# Patient Record
Sex: FEMALE | Race: WHITE | NOT HISPANIC OR LATINO | URBAN - METROPOLITAN AREA
[De-identification: names, ages, dates, MRNs, and addresses within clinical notes are randomized per-mention and may not be internally consistent; named-entity substitution may affect disease eponyms.]

---

## 2017-02-26 ENCOUNTER — EMERGENCY (EMERGENCY)
Facility: HOSPITAL | Age: 82
LOS: 1 days | Discharge: HOME | End: 2017-02-26
Attending: EMERGENCY MEDICINE | Admitting: EMERGENCY MEDICINE
Payer: COMMERCIAL

## 2017-02-26 VITALS
OXYGEN SATURATION: 97 % | HEART RATE: 61 BPM | DIASTOLIC BLOOD PRESSURE: 64 MMHG | SYSTOLIC BLOOD PRESSURE: 162 MMHG | RESPIRATION RATE: 16 BRPM

## 2017-02-26 VITALS
SYSTOLIC BLOOD PRESSURE: 164 MMHG | DIASTOLIC BLOOD PRESSURE: 61 MMHG | OXYGEN SATURATION: 97 % | TEMPERATURE: 97 F | RESPIRATION RATE: 18 BRPM | HEART RATE: 64 BPM

## 2017-02-26 DIAGNOSIS — Z96.659 PRESENCE OF UNSPECIFIED ARTIFICIAL KNEE JOINT: ICD-10-CM

## 2017-02-26 DIAGNOSIS — R42 DIZZINESS AND GIDDINESS: ICD-10-CM

## 2017-02-26 DIAGNOSIS — R26.2 DIFFICULTY IN WALKING, NOT ELSEWHERE CLASSIFIED: ICD-10-CM

## 2017-02-26 DIAGNOSIS — E11.9 TYPE 2 DIABETES MELLITUS WITHOUT COMPLICATIONS: ICD-10-CM

## 2017-02-26 DIAGNOSIS — Z96.659 PRESENCE OF UNSPECIFIED ARTIFICIAL KNEE JOINT: Chronic | ICD-10-CM

## 2017-02-26 PROBLEM — Z00.00 ENCOUNTER FOR PREVENTIVE HEALTH EXAMINATION: Status: ACTIVE | Noted: 2017-02-26

## 2017-02-26 LAB
ALBUMIN SERPL ELPH-MCNC: 4.1 G/DL — SIGNIFICANT CHANGE UP (ref 3.3–5)
ALP SERPL-CCNC: 77 U/L — SIGNIFICANT CHANGE UP (ref 40–120)
ALT FLD-CCNC: 38 U/L RC — SIGNIFICANT CHANGE UP (ref 10–45)
ANION GAP SERPL CALC-SCNC: 12 MMOL/L — SIGNIFICANT CHANGE UP (ref 5–17)
APPEARANCE UR: CLEAR — SIGNIFICANT CHANGE UP
AST SERPL-CCNC: 32 U/L — SIGNIFICANT CHANGE UP (ref 10–40)
BACTERIA # UR AUTO: ABNORMAL /HPF
BASOPHILS # BLD AUTO: 0.1 K/UL — SIGNIFICANT CHANGE UP (ref 0–0.2)
BASOPHILS NFR BLD AUTO: 1.1 % — SIGNIFICANT CHANGE UP (ref 0–2)
BILIRUB SERPL-MCNC: 0.4 MG/DL — SIGNIFICANT CHANGE UP (ref 0.2–1.2)
BILIRUB UR-MCNC: NEGATIVE — SIGNIFICANT CHANGE UP
BUN SERPL-MCNC: 17 MG/DL — SIGNIFICANT CHANGE UP (ref 7–23)
CALCIUM SERPL-MCNC: 9.5 MG/DL — SIGNIFICANT CHANGE UP (ref 8.4–10.5)
CHLORIDE SERPL-SCNC: 100 MMOL/L — SIGNIFICANT CHANGE UP (ref 96–108)
CO2 SERPL-SCNC: 29 MMOL/L — SIGNIFICANT CHANGE UP (ref 22–31)
COLOR SPEC: SIGNIFICANT CHANGE UP
CREAT SERPL-MCNC: 0.78 MG/DL — SIGNIFICANT CHANGE UP (ref 0.5–1.3)
DIFF PNL FLD: NEGATIVE — SIGNIFICANT CHANGE UP
EOSINOPHIL # BLD AUTO: 0.1 K/UL — SIGNIFICANT CHANGE UP (ref 0–0.5)
EOSINOPHIL NFR BLD AUTO: 2.4 % — SIGNIFICANT CHANGE UP (ref 0–6)
EPI CELLS # UR: SIGNIFICANT CHANGE UP /HPF
GLUCOSE SERPL-MCNC: 229 MG/DL — HIGH (ref 70–99)
GLUCOSE UR QL: NEGATIVE — SIGNIFICANT CHANGE UP
HCT VFR BLD CALC: 42.9 % — SIGNIFICANT CHANGE UP (ref 34.5–45)
HGB BLD-MCNC: 14.4 G/DL — SIGNIFICANT CHANGE UP (ref 11.5–15.5)
KETONES UR-MCNC: NEGATIVE — SIGNIFICANT CHANGE UP
LEUKOCYTE ESTERASE UR-ACNC: NEGATIVE — SIGNIFICANT CHANGE UP
LYMPHOCYTES # BLD AUTO: 1.6 K/UL — SIGNIFICANT CHANGE UP (ref 1–3.3)
LYMPHOCYTES # BLD AUTO: 26.9 % — SIGNIFICANT CHANGE UP (ref 13–44)
MCHC RBC-ENTMCNC: 32 PG — SIGNIFICANT CHANGE UP (ref 27–34)
MCHC RBC-ENTMCNC: 33.7 GM/DL — SIGNIFICANT CHANGE UP (ref 32–36)
MCV RBC AUTO: 95 FL — SIGNIFICANT CHANGE UP (ref 80–100)
MONOCYTES # BLD AUTO: 0.4 K/UL — SIGNIFICANT CHANGE UP (ref 0–0.9)
MONOCYTES NFR BLD AUTO: 6.3 % — SIGNIFICANT CHANGE UP (ref 2–14)
NEUTROPHILS # BLD AUTO: 3.7 K/UL — SIGNIFICANT CHANGE UP (ref 1.8–7.4)
NEUTROPHILS NFR BLD AUTO: 63.3 % — SIGNIFICANT CHANGE UP (ref 43–77)
NITRITE UR-MCNC: NEGATIVE — SIGNIFICANT CHANGE UP
PH UR: 6 — SIGNIFICANT CHANGE UP (ref 4.8–8)
PLATELET # BLD AUTO: 232 K/UL — SIGNIFICANT CHANGE UP (ref 150–400)
POTASSIUM SERPL-MCNC: 4.2 MMOL/L — SIGNIFICANT CHANGE UP (ref 3.5–5.3)
POTASSIUM SERPL-SCNC: 4.2 MMOL/L — SIGNIFICANT CHANGE UP (ref 3.5–5.3)
PROT SERPL-MCNC: 6.8 G/DL — SIGNIFICANT CHANGE UP (ref 6–8.3)
PROT UR-MCNC: NEGATIVE — SIGNIFICANT CHANGE UP
RBC # BLD: 4.52 M/UL — SIGNIFICANT CHANGE UP (ref 3.8–5.2)
RBC # FLD: 11.7 % — SIGNIFICANT CHANGE UP (ref 10.3–14.5)
RBC CASTS # UR COMP ASSIST: SIGNIFICANT CHANGE UP /HPF (ref 0–2)
SODIUM SERPL-SCNC: 141 MMOL/L — SIGNIFICANT CHANGE UP (ref 135–145)
SP GR SPEC: 1.01 — SIGNIFICANT CHANGE UP (ref 1.01–1.02)
UROBILINOGEN FLD QL: NEGATIVE — SIGNIFICANT CHANGE UP
WBC # BLD: 5.8 K/UL — SIGNIFICANT CHANGE UP (ref 3.8–10.5)
WBC # FLD AUTO: 5.8 K/UL — SIGNIFICANT CHANGE UP (ref 3.8–10.5)

## 2017-02-26 PROCEDURE — 70450 CT HEAD/BRAIN W/O DYE: CPT | Mod: 26

## 2017-02-26 PROCEDURE — 85027 COMPLETE CBC AUTOMATED: CPT

## 2017-02-26 PROCEDURE — 80053 COMPREHEN METABOLIC PANEL: CPT

## 2017-02-26 PROCEDURE — 81001 URINALYSIS AUTO W/SCOPE: CPT

## 2017-02-26 PROCEDURE — 82962 GLUCOSE BLOOD TEST: CPT

## 2017-02-26 PROCEDURE — 70450 CT HEAD/BRAIN W/O DYE: CPT

## 2017-02-26 PROCEDURE — 93010 ELECTROCARDIOGRAM REPORT: CPT

## 2017-02-26 PROCEDURE — 99284 EMERGENCY DEPT VISIT MOD MDM: CPT | Mod: 25

## 2017-02-26 PROCEDURE — 96374 THER/PROPH/DIAG INJ IV PUSH: CPT

## 2017-02-26 PROCEDURE — 99285 EMERGENCY DEPT VISIT HI MDM: CPT | Mod: 25

## 2017-02-26 PROCEDURE — 93005 ELECTROCARDIOGRAM TRACING: CPT

## 2017-02-26 RX ORDER — METOCLOPRAMIDE HCL 10 MG
10 TABLET ORAL ONCE
Qty: 0 | Refills: 0 | Status: COMPLETED | OUTPATIENT
Start: 2017-02-26 | End: 2017-02-26

## 2017-02-26 RX ORDER — SODIUM CHLORIDE 9 MG/ML
1000 INJECTION, SOLUTION INTRAVENOUS ONCE
Qty: 0 | Refills: 0 | Status: COMPLETED | OUTPATIENT
Start: 2017-02-26 | End: 2017-02-26

## 2017-02-26 RX ORDER — METOCLOPRAMIDE HCL 10 MG
1 TABLET ORAL
Qty: 15 | Refills: 0
Start: 2017-02-26 | End: 2017-03-03

## 2017-02-26 RX ADMIN — Medication 10 MILLIGRAM(S): at 12:35

## 2017-02-26 RX ADMIN — SODIUM CHLORIDE 333.33 MILLILITER(S): 9 INJECTION, SOLUTION INTRAVENOUS at 11:39

## 2017-02-26 NOTE — ED PROVIDER NOTE - OBJECTIVE STATEMENT
81F PMH T2DM p/w dizziness since 4pm yesterday.  Felt "hazy" and almost fell last night when going to the bathroom.  Describes symptoms as having "no balance", but has no room-spinning sensation.  Feels dizziness when lying down with eyes closed in addition.  Denies any other symptoms: no fever, chills, CP, SOB, URI/UTI symptoms, N/V/D, focal neurologic deficits, HA, vision changes.  No recent illnesses, sick contacts, recent travel.  Diabetes under questionable control, as FS in summer was in 400s.    PMD: Dr. Binta Santacruz

## 2017-02-26 NOTE — ED PROVIDER NOTE - PLAN OF CARE
Please follow up with your PMD in 24-48 hrs.  Use caution while ambulating.  Please take Reglan 10mg (1 tab) 3 times a day as needed.  If you experience worsening symptoms, please report to the ED ASAP.

## 2017-02-26 NOTE — ED ADULT NURSE NOTE - OBJECTIVE STATEMENT
Pt comes in form triage for dizziness that started yesterday.  Pt denies any weakness or numbness or tingling.  No blurred vision.  Pt denies chest pain  fever or any difficulty breathing.  IVL placed and fluids given as ordered.  ortho statics  done and pt asymptomatic when standing from sitting position.  even though bloods pressure lower Mpuleorn

## 2017-02-26 NOTE — ED ADULT NURSE NOTE - CHPI ED SYMPTOMS NEG
no nausea/no fever/no blurred vision/no loss of consciousness/no confusion/no change in level of consciousness/no numbness

## 2017-02-26 NOTE — ED PROVIDER NOTE - CARE PLAN
Principal Discharge DX:	Dizziness  Instructions for follow-up, activity and diet:	Please follow up with your PMD in 24-48 hrs.  Use caution while ambulating.  Please take Reglan 10mg (1 tab) 3 times a day as needed.  If you experience worsening symptoms, please report to the ED ASAP.

## 2017-02-26 NOTE — ED PROVIDER NOTE - MEDICAL DECISION MAKING DETAILS
Concerning for neurologic etiology.  F/U CT head, CBC, CMP, EKG, orthostatics.  FS: 220.  Will give 1L LR over 3 hrs.  Reglan 10mg IV x1.

## 2017-02-26 NOTE — ED PROVIDER NOTE - ATTENDING CONTRIBUTION TO CARE
I have seen and evaluated this patient with the resident.   I agree with the findings  unless other wise stated.  I have made appropriate changes in documentations where needed, After my face to face bedside evaluation, I am further  noting: Concerning for neurologic etiology.  F/U CT head, CBC, CMP, EKG, orthostatics.  FS: 220.  Will give 1L LR over 3 hrs.  Reglan 10mg IV x1. CT head neg for acute pathology improved treatment in ED ambulatory --Peña

## 2017-02-26 NOTE — ED PROVIDER NOTE - NEUROLOGICAL, MLM
Alert and oriented, no focal deficits, no motor or sensory deficits.  Heel to leg test normal.  UE coordination intact.

## 2017-02-26 NOTE — ED PROVIDER NOTE - PROGRESS NOTE DETAILS
CT head unremarkable.  Labs wnl.  Pt. able to ambulate.  Will D/C with 5 day rx for reglan and PMD follow up.

## 2017-02-26 NOTE — ED ADULT NURSE REASSESSMENT NOTE - NS ED NURSE REASSESS COMMENT FT1
1410 Pt d/c with elevated blood pressure as when she came in with MD aware Pt not on ay blood pressure medication Blancaorn

## 2017-11-28 ENCOUNTER — RESULT REVIEW (OUTPATIENT)
Age: 82
End: 2017-11-28

## 2019-12-18 ENCOUNTER — EMERGENCY (EMERGENCY)
Facility: HOSPITAL | Age: 84
LOS: 1 days | Discharge: HOME | End: 2019-12-18
Attending: EMERGENCY MEDICINE | Admitting: EMERGENCY MEDICINE
Payer: MEDICARE

## 2019-12-18 VITALS
OXYGEN SATURATION: 97 % | TEMPERATURE: 98 F | HEART RATE: 87 BPM | RESPIRATION RATE: 16 BRPM | SYSTOLIC BLOOD PRESSURE: 143 MMHG | DIASTOLIC BLOOD PRESSURE: 62 MMHG

## 2019-12-18 VITALS
OXYGEN SATURATION: 100 % | SYSTOLIC BLOOD PRESSURE: 134 MMHG | HEART RATE: 77 BPM | TEMPERATURE: 98 F | DIASTOLIC BLOOD PRESSURE: 63 MMHG | RESPIRATION RATE: 16 BRPM

## 2019-12-18 DIAGNOSIS — Z96.659 PRESENCE OF UNSPECIFIED ARTIFICIAL KNEE JOINT: Chronic | ICD-10-CM

## 2019-12-18 PROBLEM — E11.9 TYPE 2 DIABETES MELLITUS WITHOUT COMPLICATIONS: Chronic | Status: ACTIVE | Noted: 2017-02-26

## 2019-12-18 LAB
ALBUMIN SERPL ELPH-MCNC: 4.1 G/DL — SIGNIFICANT CHANGE UP (ref 3.3–5)
ALP SERPL-CCNC: 84 U/L — SIGNIFICANT CHANGE UP (ref 40–120)
ALT FLD-CCNC: 15 U/L — SIGNIFICANT CHANGE UP (ref 4–33)
ANION GAP SERPL CALC-SCNC: 18 MMO/L — HIGH (ref 7–14)
APPEARANCE UR: CLEAR — SIGNIFICANT CHANGE UP
AST SERPL-CCNC: 22 U/L — SIGNIFICANT CHANGE UP (ref 4–32)
BACTERIA # UR AUTO: SIGNIFICANT CHANGE UP
BASE EXCESS BLDV CALC-SCNC: 0.7 MMOL/L — SIGNIFICANT CHANGE UP
BASE EXCESS BLDV CALC-SCNC: 3.4 MMOL/L — SIGNIFICANT CHANGE UP
BASOPHILS # BLD AUTO: 0.04 K/UL — SIGNIFICANT CHANGE UP (ref 0–0.2)
BASOPHILS NFR BLD AUTO: 0.5 % — SIGNIFICANT CHANGE UP (ref 0–2)
BILIRUB SERPL-MCNC: 0.4 MG/DL — SIGNIFICANT CHANGE UP (ref 0.2–1.2)
BILIRUB UR-MCNC: NEGATIVE — SIGNIFICANT CHANGE UP
BLOOD GAS VENOUS - CREATININE: 0.77 MG/DL — SIGNIFICANT CHANGE UP (ref 0.5–1.3)
BLOOD GAS VENOUS - CREATININE: 0.8 MG/DL — SIGNIFICANT CHANGE UP (ref 0.5–1.3)
BLOOD GAS VENOUS - FIO2: 21 — SIGNIFICANT CHANGE UP
BLOOD GAS VENOUS - FIO2: 21 — SIGNIFICANT CHANGE UP
BLOOD UR QL VISUAL: NEGATIVE — SIGNIFICANT CHANGE UP
BUN SERPL-MCNC: 21 MG/DL — SIGNIFICANT CHANGE UP (ref 7–23)
CALCIUM SERPL-MCNC: 9.9 MG/DL — SIGNIFICANT CHANGE UP (ref 8.4–10.5)
CHLORIDE BLDV-SCNC: 102 MMOL/L — SIGNIFICANT CHANGE UP (ref 96–108)
CHLORIDE BLDV-SCNC: 99 MMOL/L — SIGNIFICANT CHANGE UP (ref 96–108)
CHLORIDE SERPL-SCNC: 96 MMOL/L — LOW (ref 98–107)
CO2 SERPL-SCNC: 23 MMOL/L — SIGNIFICANT CHANGE UP (ref 22–31)
COLOR SPEC: SIGNIFICANT CHANGE UP
CREAT SERPL-MCNC: 0.87 MG/DL — SIGNIFICANT CHANGE UP (ref 0.5–1.3)
EOSINOPHIL # BLD AUTO: 0.1 K/UL — SIGNIFICANT CHANGE UP (ref 0–0.5)
EOSINOPHIL NFR BLD AUTO: 1.3 % — SIGNIFICANT CHANGE UP (ref 0–6)
GAS PNL BLDV: 139 MMOL/L — SIGNIFICANT CHANGE UP (ref 136–146)
GAS PNL BLDV: 139 MMOL/L — SIGNIFICANT CHANGE UP (ref 136–146)
GLUCOSE BLDV-MCNC: 296 MG/DL — HIGH (ref 70–99)
GLUCOSE BLDV-MCNC: 358 MG/DL — HIGH (ref 70–99)
GLUCOSE SERPL-MCNC: 360 MG/DL — HIGH (ref 70–99)
GLUCOSE UR-MCNC: >1000 — HIGH
HCO3 BLDV-SCNC: 24 MMOL/L — SIGNIFICANT CHANGE UP (ref 20–27)
HCO3 BLDV-SCNC: 25 MMOL/L — SIGNIFICANT CHANGE UP (ref 20–27)
HCT VFR BLD CALC: 45.9 % — HIGH (ref 34.5–45)
HCT VFR BLDV CALC: 41.5 % — SIGNIFICANT CHANGE UP (ref 34.5–45)
HCT VFR BLDV CALC: 46.4 % — HIGH (ref 34.5–45)
HGB BLD-MCNC: 14.8 G/DL — SIGNIFICANT CHANGE UP (ref 11.5–15.5)
HGB BLDV-MCNC: 13.5 G/DL — SIGNIFICANT CHANGE UP (ref 11.5–15.5)
HGB BLDV-MCNC: 15.2 G/DL — SIGNIFICANT CHANGE UP (ref 11.5–15.5)
HYALINE CASTS # UR AUTO: SIGNIFICANT CHANGE UP
IMM GRANULOCYTES NFR BLD AUTO: 0.5 % — SIGNIFICANT CHANGE UP (ref 0–1.5)
KETONES UR-MCNC: NEGATIVE — SIGNIFICANT CHANGE UP
LACTATE BLDV-MCNC: 2.6 MMOL/L — HIGH (ref 0.5–2)
LACTATE BLDV-MCNC: 4.9 MMOL/L — CRITICAL HIGH (ref 0.5–2)
LEUKOCYTE ESTERASE UR-ACNC: NEGATIVE — SIGNIFICANT CHANGE UP
LYMPHOCYTES # BLD AUTO: 1.17 K/UL — SIGNIFICANT CHANGE UP (ref 1–3.3)
LYMPHOCYTES # BLD AUTO: 15 % — SIGNIFICANT CHANGE UP (ref 13–44)
MCHC RBC-ENTMCNC: 31.2 PG — SIGNIFICANT CHANGE UP (ref 27–34)
MCHC RBC-ENTMCNC: 32.2 % — SIGNIFICANT CHANGE UP (ref 32–36)
MCV RBC AUTO: 96.6 FL — SIGNIFICANT CHANGE UP (ref 80–100)
MONOCYTES # BLD AUTO: 0.5 K/UL — SIGNIFICANT CHANGE UP (ref 0–0.9)
MONOCYTES NFR BLD AUTO: 6.4 % — SIGNIFICANT CHANGE UP (ref 2–14)
NEUTROPHILS # BLD AUTO: 5.97 K/UL — SIGNIFICANT CHANGE UP (ref 1.8–7.4)
NEUTROPHILS NFR BLD AUTO: 76.3 % — SIGNIFICANT CHANGE UP (ref 43–77)
NITRITE UR-MCNC: NEGATIVE — SIGNIFICANT CHANGE UP
NRBC # FLD: 0 K/UL — SIGNIFICANT CHANGE UP (ref 0–0)
PCO2 BLDV: 51 MMHG — SIGNIFICANT CHANGE UP (ref 41–51)
PCO2 BLDV: 60 MMHG — HIGH (ref 41–51)
PH BLDV: 7.31 PH — LOW (ref 7.32–7.43)
PH BLDV: 7.33 PH — SIGNIFICANT CHANGE UP (ref 7.32–7.43)
PH UR: 5.5 — SIGNIFICANT CHANGE UP (ref 5–8)
PLATELET # BLD AUTO: 270 K/UL — SIGNIFICANT CHANGE UP (ref 150–400)
PMV BLD: 10.6 FL — SIGNIFICANT CHANGE UP (ref 7–13)
PO2 BLDV: 24 MMHG — LOW (ref 35–40)
PO2 BLDV: 40 MMHG — SIGNIFICANT CHANGE UP (ref 35–40)
POTASSIUM BLDV-SCNC: 4.2 MMOL/L — SIGNIFICANT CHANGE UP (ref 3.4–4.5)
POTASSIUM BLDV-SCNC: 4.6 MMOL/L — HIGH (ref 3.4–4.5)
POTASSIUM SERPL-MCNC: 4.6 MMOL/L — SIGNIFICANT CHANGE UP (ref 3.5–5.3)
POTASSIUM SERPL-SCNC: 4.6 MMOL/L — SIGNIFICANT CHANGE UP (ref 3.5–5.3)
PROT SERPL-MCNC: 7.3 G/DL — SIGNIFICANT CHANGE UP (ref 6–8.3)
PROT UR-MCNC: 20 — SIGNIFICANT CHANGE UP
RBC # BLD: 4.75 M/UL — SIGNIFICANT CHANGE UP (ref 3.8–5.2)
RBC # FLD: 12.4 % — SIGNIFICANT CHANGE UP (ref 10.3–14.5)
RBC CASTS # UR COMP ASSIST: HIGH (ref 0–?)
SAO2 % BLDV: 34.6 % — LOW (ref 60–85)
SAO2 % BLDV: 69.6 % — SIGNIFICANT CHANGE UP (ref 60–85)
SODIUM SERPL-SCNC: 137 MMOL/L — SIGNIFICANT CHANGE UP (ref 135–145)
SP GR SPEC: 1.03 — SIGNIFICANT CHANGE UP (ref 1–1.04)
SQUAMOUS # UR AUTO: SIGNIFICANT CHANGE UP
TROPONIN T, HIGH SENSITIVITY: 13 NG/L — SIGNIFICANT CHANGE UP (ref ?–14)
UROBILINOGEN FLD QL: NORMAL — SIGNIFICANT CHANGE UP
WBC # BLD: 7.82 K/UL — SIGNIFICANT CHANGE UP (ref 3.8–10.5)
WBC # FLD AUTO: 7.82 K/UL — SIGNIFICANT CHANGE UP (ref 3.8–10.5)
WBC UR QL: SIGNIFICANT CHANGE UP (ref 0–?)

## 2019-12-18 PROCEDURE — 71046 X-RAY EXAM CHEST 2 VIEWS: CPT | Mod: 26

## 2019-12-18 PROCEDURE — 74177 CT ABD & PELVIS W/CONTRAST: CPT | Mod: 26

## 2019-12-18 PROCEDURE — 99284 EMERGENCY DEPT VISIT MOD MDM: CPT

## 2019-12-18 RX ORDER — SODIUM CHLORIDE 9 MG/ML
1000 INJECTION INTRAMUSCULAR; INTRAVENOUS; SUBCUTANEOUS ONCE
Refills: 0 | Status: COMPLETED | OUTPATIENT
Start: 2019-12-18 | End: 2019-12-18

## 2019-12-18 RX ADMIN — SODIUM CHLORIDE 1000 MILLILITER(S): 9 INJECTION INTRAMUSCULAR; INTRAVENOUS; SUBCUTANEOUS at 10:50

## 2019-12-18 NOTE — ED PROVIDER NOTE - OBJECTIVE STATEMENT
83 y/o F PMH DM2 c/o acute on chronic diarrhea x 3-4 days. Pt reports diarrhea amount and frequency increased over the weekend. Pt baseline is loose stools 3-4x a day. Now BMs are watery. Called her PCP over the weekend who advised her she should go to the ER. Pt denies fever, abd pain, cp, sob. Denies having an colonoscopy during her reported chronic diarrhea issue over the past few years., recent antibx use, recent hospitalizations, recent travel.

## 2019-12-18 NOTE — ED PROVIDER NOTE - PROGRESS NOTE DETAILS
Pt feeling better, has no complaints. Spoke with pt's PMD, Dr. Del Cid, who states he can see her tomorrow.

## 2019-12-18 NOTE — ED PROVIDER NOTE - PATIENT PORTAL LINK FT
You can access the FollowMyHealth Patient Portal offered by [facility] by registering at the following website: [email or web address]. By joining [facility]’s FollowMyHealth portal, you will also be able to view your health information using other applications (apps) compatible with our system.

## 2019-12-18 NOTE — ED PROVIDER NOTE - ATTENDING CONTRIBUTION TO CARE
Dr. Farrell:  I performed a face to face bedside interview with patient regarding history of present illness, review of symptoms and past medical history. I completed an independent physical exam.  I have discussed patient's plan of care with PA.   I agree with note as stated above, having amended the EMR as needed to reflect my findings.   This includes HISTORY OF PRESENT ILLNESS, HIV, PAST MEDICAL/SURGICAL/FAMILY/SOCIAL HISTORY, ALLERGIES AND HOME MEDICATIONS, REVIEW OF SYSTEMS, PHYSICAL EXAM, and any PROGRESS NOTES during the time I functioned as the attending physician for this patient.    84F h/o NIDDM and chronic diarrhea sent in by PCP for reportedly worse diarrhea over past few days.  Patient with non-specific dizziness over past few days.  States she normally has 3 loose bowel movements a day but over past weekend has been having non-stop watery stool.      Exam:  - diaphoretic, no acute distress  - rrr  - ctab  - abd soft ntnd    A/P  - diarrhea, eval infection/abd pathology; dizziness possibly secondary to hypovolemia  - cbc, cmp, trop, vbg, CT abd/pelvis, cxr, ua, urine culture

## 2019-12-18 NOTE — ED ADULT NURSE NOTE - OBJECTIVE STATEMENT
Pt received in exam room 14. alert and oriented x3, ambulatory. Presents to ED co watery kristajannetteea x few days. States for 2 years she has had chronic diarrhea, but over last few days it has been watery and frequent. Denies abdominal pain, nausea, vomiting, fevers, chills, cp, sob. Labs sent. 20G IV placed to left AC. Pt received in exam room 14. alert and oriented x3, ambulatory. Past med history of diabetes. Presents to ED co watery diarrhea x "few days". States for 2 years she has had chronic diarrhea, but over last few days it has been watery and frequent. Denies ever seeing a GI. Denies abdominal pain, nausea, vomiting, fevers, chills, cp, sob. Co dizziness x 2 days. In exam room patient noted to be cool and clammy. No edema noted to extremities. . Labs sent. 20G IV placed to left AC. VS stable. Will continue to monitor.

## 2019-12-18 NOTE — ED PROVIDER NOTE - CLINICAL SUMMARY MEDICAL DECISION MAKING FREE TEXT BOX
83 y/o F PMH DM2 c/o acute on chronic diarrhea x 3-4 days. Pt reports diarrhea amount and frequency increased over the weekend.  abd soft/nt/nd, pt appears diaphoretic  dx: diarrhea, r/o GI pathology  plan: labs, troponin, ekg CTAP, 1L NS

## 2019-12-18 NOTE — ED PROVIDER NOTE - NSFOLLOWUPINSTRUCTIONS_ED_ALL_ED_FT
Advance activity as tolerated.  Continue all previously prescribed medications as directed unless otherwise instructed.  Follow up with your primary care physician and gastroenterology (referral list provided) in 48-72 hours- bring copies of your results.  Return to the ER for worsening or persistent symptoms, including but not limited to worsening/persistent diarrhea, lightheadedness, weakness, fevers, vomiting, and/or ANY NEW OR CONCERNING SYMPTOMS. If you have issues obtaining follow up, please call: [phone] to obtain a doctor or specialist who takes your insurance in your area.  You may call [phone] to make an appointment with the internal medicine clinic.

## 2019-12-18 NOTE — ED PROVIDER NOTE - CONSTITUTIONAL, MLM
normal... diaphoretic but Well appearing, awake, alert, oriented to person, place, time/situation and in no apparent distress.

## 2019-12-19 LAB
BACTERIA UR CULT: SIGNIFICANT CHANGE UP
SPECIMEN SOURCE: SIGNIFICANT CHANGE UP

## 2019-12-20 NOTE — ED POST DISCHARGE NOTE - RESULT SUMMARY
UCX: Culture grew 3 or more types of organisms which indicate collection contamination; consider recollection only if clinically indicated. No antibiotic noted.. Pt is elderly DM2 who c/o diarrhea and dizziness

## 2021-11-24 ENCOUNTER — APPOINTMENT (OUTPATIENT)
Dept: INTERNAL MEDICINE | Facility: CLINIC | Age: 86
End: 2021-11-24
Payer: MEDICARE

## 2021-11-24 ENCOUNTER — NON-APPOINTMENT (OUTPATIENT)
Age: 86
End: 2021-11-24

## 2021-11-24 VITALS
WEIGHT: 198 LBS | RESPIRATION RATE: 12 BRPM | SYSTOLIC BLOOD PRESSURE: 150 MMHG | TEMPERATURE: 96.3 F | BODY MASS INDEX: 36.44 KG/M2 | DIASTOLIC BLOOD PRESSURE: 72 MMHG | HEIGHT: 62 IN | OXYGEN SATURATION: 98 % | HEART RATE: 75 BPM

## 2021-11-24 DIAGNOSIS — Z00.00 ENCOUNTER FOR GENERAL ADULT MEDICAL EXAMINATION W/OUT ABNORMAL FINDINGS: ICD-10-CM

## 2021-11-24 DIAGNOSIS — Z23 ENCOUNTER FOR IMMUNIZATION: ICD-10-CM

## 2021-11-24 DIAGNOSIS — Z80.3 FAMILY HISTORY OF MALIGNANT NEOPLASM OF BREAST: ICD-10-CM

## 2021-11-24 PROCEDURE — 99387 INIT PM E/M NEW PAT 65+ YRS: CPT

## 2021-11-27 NOTE — PLAN
[FreeTextEntry1] : Physical\par \par 1. Diabetes\par Low carb, low sugar diet\par cont Amaryl 4 mg twice daily\par cont Prandin 1 mg TID \par \par Labs ordered pt to f/u for results

## 2021-11-27 NOTE — HISTORY OF PRESENT ILLNESS
[de-identified] : Padmini Lam 86 year old female with history of Diabetes x 10-15 years / Anemia  presents for annual  physical \par Her last doctor visit was  6 months ago . Not monitoring her glucose at home \par \par She reports about a month ago she fell trying to go to the bathroom at night. Denies hitting her head, or going to the ED at the time. She currently lives alone and independent\par She tries to eat healthy \par \par Denies CP, SOB, N/V or abdominal pain , denies headache \par last eye exam 1-2 years ago\par cardio exam never \par not compliant with  screenings

## 2021-11-27 NOTE — PHYSICAL EXAM
[No Acute Distress] : no acute distress [Well Nourished] : well nourished [Normal Sclera/Conjunctiva] : normal sclera/conjunctiva [EOMI] : extraocular movements intact [Normal Outer Ear/Nose] : the outer ears and nose were normal in appearance [Normal Oropharynx] : the oropharynx was normal [No JVD] : no jugular venous distention [No Lymphadenopathy] : no lymphadenopathy [Supple] : supple [Thyroid Normal, No Nodules] : the thyroid was normal and there were no nodules present [No Respiratory Distress] : no respiratory distress  [No Accessory Muscle Use] : no accessory muscle use [Clear to Auscultation] : lungs were clear to auscultation bilaterally [Normal Rate] : normal rate  [Regular Rhythm] : with a regular rhythm [Normal S1, S2] : normal S1 and S2 [No Murmur] : no murmur heard [Pedal Pulses Present] : the pedal pulses are present [No Edema] : there was no peripheral edema [Soft] : abdomen soft [Non Tender] : non-tender [Non-distended] : non-distended [Normal Bowel Sounds] : normal bowel sounds [Normal Posterior Cervical Nodes] : no posterior cervical lymphadenopathy [Normal Anterior Cervical Nodes] : no anterior cervical lymphadenopathy [No CVA Tenderness] : no CVA  tenderness [No Spinal Tenderness] : no spinal tenderness [No Joint Swelling] : no joint swelling [Grossly Normal Strength/Tone] : grossly normal strength/tone [No Rash] : no rash [No Focal Deficits] : no focal deficits [Normal Gait] : normal gait [Normal Affect] : the affect was normal [Normal Insight/Judgement] : insight and judgment were intact [Normal TMs] : both tympanic membranes were normal

## 2021-12-10 LAB
25(OH)D3 SERPL-MCNC: 45.3 NG/ML
ALBUMIN SERPL ELPH-MCNC: 4.2 G/DL
ALP BLD-CCNC: 96 U/L
ALT SERPL-CCNC: 22 U/L
ANION GAP SERPL CALC-SCNC: 21 MMOL/L
AST SERPL-CCNC: 30 U/L
BILIRUB SERPL-MCNC: 0.3 MG/DL
BUN SERPL-MCNC: 18 MG/DL
CALCIUM SERPL-MCNC: 10 MG/DL
CHLORIDE SERPL-SCNC: 97 MMOL/L
CHOLEST SERPL-MCNC: 224 MG/DL
CO2 SERPL-SCNC: 21 MMOL/L
COVID-19 NUCLEOCAPSID  GAM ANTIBODY INTERPRETATION: NEGATIVE
COVID-19 SPIKE DOMAIN ANTIBODY INTERPRETATION: POSITIVE
CREAT SERPL-MCNC: 0.88 MG/DL
FERRITIN SERPL-MCNC: 180 NG/ML
GLUCOSE SERPL-MCNC: 353 MG/DL
HDLC SERPL-MCNC: 64 MG/DL
IRON SATN MFR SERPL: 31 %
IRON SERPL-MCNC: 101 UG/DL
LDLC SERPL CALC-MCNC: 131 MG/DL
NONHDLC SERPL-MCNC: 160 MG/DL
POTASSIUM SERPL-SCNC: 5 MMOL/L
PROT SERPL-MCNC: 7.3 G/DL
SARS-COV-2 AB SERPL IA-ACNC: >250 U/ML
SARS-COV-2 AB SERPL QL IA: 0.08 INDEX
SODIUM SERPL-SCNC: 139 MMOL/L
TIBC SERPL-MCNC: 325 UG/DL
TRIGL SERPL-MCNC: 144 MG/DL
TSH SERPL-ACNC: 1.57 UIU/ML
UIBC SERPL-MCNC: 224 UG/DL
VIT B12 SERPL-MCNC: 694 PG/ML

## 2022-05-23 ENCOUNTER — INPATIENT (INPATIENT)
Facility: HOSPITAL | Age: 87
LOS: 13 days | Discharge: HOME HEALTH | DRG: 872 | End: 2022-06-06
Attending: HOSPITALIST | Admitting: HOSPITALIST
Payer: COMMERCIAL

## 2022-05-23 VITALS
OXYGEN SATURATION: 94 % | WEIGHT: 197.98 LBS | TEMPERATURE: 98 F | DIASTOLIC BLOOD PRESSURE: 67 MMHG | RESPIRATION RATE: 20 BRPM | HEIGHT: 62 IN | SYSTOLIC BLOOD PRESSURE: 108 MMHG | HEART RATE: 123 BPM

## 2022-05-23 DIAGNOSIS — Z96.659 PRESENCE OF UNSPECIFIED ARTIFICIAL KNEE JOINT: Chronic | ICD-10-CM

## 2022-05-23 LAB
ALBUMIN SERPL ELPH-MCNC: 3.7 G/DL — SIGNIFICANT CHANGE UP (ref 3.3–5)
ALP SERPL-CCNC: 86 U/L — SIGNIFICANT CHANGE UP (ref 40–120)
ALT FLD-CCNC: 12 U/L — SIGNIFICANT CHANGE UP (ref 10–45)
ANION GAP SERPL CALC-SCNC: 17 MMOL/L — SIGNIFICANT CHANGE UP (ref 5–17)
APPEARANCE UR: CLEAR — SIGNIFICANT CHANGE UP
APTT BLD: 24.8 SEC — LOW (ref 27.5–35.5)
APTT BLD: 28.4 SEC — SIGNIFICANT CHANGE UP (ref 27.5–35.5)
AST SERPL-CCNC: 17 U/L — SIGNIFICANT CHANGE UP (ref 10–40)
BACTERIA # UR AUTO: NEGATIVE — SIGNIFICANT CHANGE UP
BASE EXCESS BLDV CALC-SCNC: -0.2 MMOL/L — SIGNIFICANT CHANGE UP (ref -2–2)
BASE EXCESS BLDV CALC-SCNC: 1.9 MMOL/L — SIGNIFICANT CHANGE UP (ref -2–2)
BASOPHILS # BLD AUTO: 0 K/UL — SIGNIFICANT CHANGE UP (ref 0–0.2)
BASOPHILS NFR BLD AUTO: 0 % — SIGNIFICANT CHANGE UP (ref 0–2)
BILIRUB SERPL-MCNC: 1.1 MG/DL — SIGNIFICANT CHANGE UP (ref 0.2–1.2)
BILIRUB UR-MCNC: NEGATIVE — SIGNIFICANT CHANGE UP
BUN SERPL-MCNC: 16 MG/DL — SIGNIFICANT CHANGE UP (ref 7–23)
CA-I SERPL-SCNC: 1.18 MMOL/L — SIGNIFICANT CHANGE UP (ref 1.15–1.33)
CA-I SERPL-SCNC: 1.2 MMOL/L — SIGNIFICANT CHANGE UP (ref 1.15–1.33)
CALCIUM SERPL-MCNC: 9.8 MG/DL — SIGNIFICANT CHANGE UP (ref 8.4–10.5)
CHLORIDE BLDV-SCNC: 96 MMOL/L — SIGNIFICANT CHANGE UP (ref 96–108)
CHLORIDE BLDV-SCNC: 98 MMOL/L — SIGNIFICANT CHANGE UP (ref 96–108)
CHLORIDE SERPL-SCNC: 97 MMOL/L — SIGNIFICANT CHANGE UP (ref 96–108)
CO2 BLDV-SCNC: 27 MMOL/L — HIGH (ref 22–26)
CO2 BLDV-SCNC: 30 MMOL/L — HIGH (ref 22–26)
CO2 SERPL-SCNC: 22 MMOL/L — SIGNIFICANT CHANGE UP (ref 22–31)
COLOR SPEC: YELLOW — SIGNIFICANT CHANGE UP
CREAT SERPL-MCNC: 0.97 MG/DL — SIGNIFICANT CHANGE UP (ref 0.5–1.3)
DIFF PNL FLD: NEGATIVE — SIGNIFICANT CHANGE UP
EGFR: 57 ML/MIN/1.73M2 — LOW
EOSINOPHIL # BLD AUTO: 0 K/UL — SIGNIFICANT CHANGE UP (ref 0–0.5)
EOSINOPHIL NFR BLD AUTO: 0 % — SIGNIFICANT CHANGE UP (ref 0–6)
EPI CELLS # UR: 3 /HPF — SIGNIFICANT CHANGE UP
FLUAV AG NPH QL: SIGNIFICANT CHANGE UP
FLUBV AG NPH QL: SIGNIFICANT CHANGE UP
GAS PNL BLDV: 130 MMOL/L — LOW (ref 136–145)
GAS PNL BLDV: 131 MMOL/L — LOW (ref 136–145)
GAS PNL BLDV: SIGNIFICANT CHANGE UP
GLUCOSE BLDV-MCNC: 354 MG/DL — HIGH (ref 70–99)
GLUCOSE BLDV-MCNC: 356 MG/DL — HIGH (ref 70–99)
GLUCOSE SERPL-MCNC: 331 MG/DL — HIGH (ref 70–99)
GLUCOSE UR QL: ABNORMAL
HCO3 BLDV-SCNC: 25 MMOL/L — SIGNIFICANT CHANGE UP (ref 22–29)
HCO3 BLDV-SCNC: 28 MMOL/L — SIGNIFICANT CHANGE UP (ref 22–29)
HCT VFR BLD CALC: 44.3 % — SIGNIFICANT CHANGE UP (ref 34.5–45)
HCT VFR BLDA CALC: 39 % — SIGNIFICANT CHANGE UP (ref 34.5–46.5)
HCT VFR BLDA CALC: 45 % — SIGNIFICANT CHANGE UP (ref 34.5–46.5)
HGB BLD CALC-MCNC: 13 G/DL — SIGNIFICANT CHANGE UP (ref 11.7–16.1)
HGB BLD CALC-MCNC: 15.1 G/DL — SIGNIFICANT CHANGE UP (ref 11.7–16.1)
HGB BLD-MCNC: 14.6 G/DL — SIGNIFICANT CHANGE UP (ref 11.5–15.5)
HYALINE CASTS # UR AUTO: 6 /LPF — HIGH (ref 0–2)
INR BLD: 1.18 RATIO — HIGH (ref 0.88–1.16)
INR BLD: 1.4 RATIO — HIGH (ref 0.88–1.16)
KETONES UR-MCNC: ABNORMAL
LACTATE BLDV-MCNC: 2.6 MMOL/L — HIGH (ref 0.7–2)
LACTATE BLDV-MCNC: 3.7 MMOL/L — HIGH (ref 0.7–2)
LEUKOCYTE ESTERASE UR-ACNC: NEGATIVE — SIGNIFICANT CHANGE UP
LYMPHOCYTES # BLD AUTO: 0.63 K/UL — LOW (ref 1–3.3)
LYMPHOCYTES # BLD AUTO: 5.4 % — LOW (ref 13–44)
MANUAL SMEAR VERIFICATION: SIGNIFICANT CHANGE UP
MCHC RBC-ENTMCNC: 30.9 PG — SIGNIFICANT CHANGE UP (ref 27–34)
MCHC RBC-ENTMCNC: 33 GM/DL — SIGNIFICANT CHANGE UP (ref 32–36)
MCV RBC AUTO: 93.9 FL — SIGNIFICANT CHANGE UP (ref 80–100)
MONOCYTES # BLD AUTO: 0.74 K/UL — SIGNIFICANT CHANGE UP (ref 0–0.9)
MONOCYTES NFR BLD AUTO: 6.3 % — SIGNIFICANT CHANGE UP (ref 2–14)
NEUTROPHILS # BLD AUTO: 10.23 K/UL — HIGH (ref 1.8–7.4)
NEUTROPHILS NFR BLD AUTO: 72.1 % — SIGNIFICANT CHANGE UP (ref 43–77)
NEUTS BAND # BLD: 15.3 % — HIGH (ref 0–8)
NITRITE UR-MCNC: NEGATIVE — SIGNIFICANT CHANGE UP
PCO2 BLDV: 44 MMHG — HIGH (ref 39–42)
PCO2 BLDV: 50 MMHG — HIGH (ref 39–42)
PH BLDV: 7.36 — SIGNIFICANT CHANGE UP (ref 7.32–7.43)
PH BLDV: 7.37 — SIGNIFICANT CHANGE UP (ref 7.32–7.43)
PH UR: 6 — SIGNIFICANT CHANGE UP (ref 5–8)
PLAT MORPH BLD: NORMAL — SIGNIFICANT CHANGE UP
PLATELET # BLD AUTO: 239 K/UL — SIGNIFICANT CHANGE UP (ref 150–400)
PO2 BLDV: 29 MMHG — SIGNIFICANT CHANGE UP (ref 25–45)
PO2 BLDV: 45 MMHG — SIGNIFICANT CHANGE UP (ref 25–45)
POTASSIUM BLDV-SCNC: 4.7 MMOL/L — SIGNIFICANT CHANGE UP (ref 3.5–5.1)
POTASSIUM BLDV-SCNC: 5.1 MMOL/L — SIGNIFICANT CHANGE UP (ref 3.5–5.1)
POTASSIUM SERPL-MCNC: 4.4 MMOL/L — SIGNIFICANT CHANGE UP (ref 3.5–5.3)
POTASSIUM SERPL-SCNC: 4.4 MMOL/L — SIGNIFICANT CHANGE UP (ref 3.5–5.3)
PROT SERPL-MCNC: 7.1 G/DL — SIGNIFICANT CHANGE UP (ref 6–8.3)
PROT UR-MCNC: ABNORMAL
PROTHROM AB SERPL-ACNC: 13.7 SEC — HIGH (ref 10.5–13.4)
PROTHROM AB SERPL-ACNC: 16.3 SEC — HIGH (ref 10.5–13.4)
RBC # BLD: 4.72 M/UL — SIGNIFICANT CHANGE UP (ref 3.8–5.2)
RBC # FLD: 12.1 % — SIGNIFICANT CHANGE UP (ref 10.3–14.5)
RBC BLD AUTO: SIGNIFICANT CHANGE UP
RBC CASTS # UR COMP ASSIST: 2 /HPF — SIGNIFICANT CHANGE UP (ref 0–4)
RSV RNA NPH QL NAA+NON-PROBE: SIGNIFICANT CHANGE UP
SAO2 % BLDV: 47.3 % — LOW (ref 67–88)
SAO2 % BLDV: 66 % — LOW (ref 67–88)
SARS-COV-2 RNA SPEC QL NAA+PROBE: SIGNIFICANT CHANGE UP
SODIUM SERPL-SCNC: 136 MMOL/L — SIGNIFICANT CHANGE UP (ref 135–145)
SP GR SPEC: 1.05 — HIGH (ref 1.01–1.02)
TROPONIN T, HIGH SENSITIVITY RESULT: 16 NG/L — SIGNIFICANT CHANGE UP (ref 0–51)
UROBILINOGEN FLD QL: NEGATIVE — SIGNIFICANT CHANGE UP
VARIANT LYMPHS # BLD: 0.9 % — SIGNIFICANT CHANGE UP (ref 0–6)
WBC # BLD: 11.7 K/UL — HIGH (ref 3.8–10.5)
WBC # FLD AUTO: 11.7 K/UL — HIGH (ref 3.8–10.5)
WBC UR QL: 1 /HPF — SIGNIFICANT CHANGE UP (ref 0–5)

## 2022-05-23 PROCEDURE — 74174 CTA ABD&PLVS W/CONTRAST: CPT | Mod: 26,MA

## 2022-05-23 PROCEDURE — 71275 CT ANGIOGRAPHY CHEST: CPT | Mod: 26,MA

## 2022-05-23 PROCEDURE — 93308 TTE F-UP OR LMTD: CPT | Mod: 26

## 2022-05-23 PROCEDURE — 71045 X-RAY EXAM CHEST 1 VIEW: CPT | Mod: 26

## 2022-05-23 PROCEDURE — 99285 EMERGENCY DEPT VISIT HI MDM: CPT

## 2022-05-23 RX ORDER — PIPERACILLIN AND TAZOBACTAM 4; .5 G/20ML; G/20ML
3.38 INJECTION, POWDER, LYOPHILIZED, FOR SOLUTION INTRAVENOUS ONCE
Refills: 0 | Status: COMPLETED | OUTPATIENT
Start: 2022-05-23 | End: 2022-05-23

## 2022-05-23 RX ORDER — SODIUM CHLORIDE 9 MG/ML
500 INJECTION INTRAMUSCULAR; INTRAVENOUS; SUBCUTANEOUS ONCE
Refills: 0 | Status: COMPLETED | OUTPATIENT
Start: 2022-05-23 | End: 2022-05-23

## 2022-05-23 RX ORDER — SODIUM CHLORIDE 9 MG/ML
1000 INJECTION, SOLUTION INTRAVENOUS ONCE
Refills: 0 | Status: COMPLETED | OUTPATIENT
Start: 2022-05-23 | End: 2022-05-23

## 2022-05-23 RX ORDER — VANCOMYCIN HCL 1 G
1000 VIAL (EA) INTRAVENOUS ONCE
Refills: 0 | Status: COMPLETED | OUTPATIENT
Start: 2022-05-23 | End: 2022-05-23

## 2022-05-23 RX ADMIN — SODIUM CHLORIDE 1000 MILLILITER(S): 9 INJECTION, SOLUTION INTRAVENOUS at 18:55

## 2022-05-23 RX ADMIN — Medication 250 MILLIGRAM(S): at 21:22

## 2022-05-23 RX ADMIN — PIPERACILLIN AND TAZOBACTAM 200 GRAM(S): 4; .5 INJECTION, POWDER, LYOPHILIZED, FOR SOLUTION INTRAVENOUS at 18:55

## 2022-05-23 NOTE — ED PROVIDER NOTE - OBJECTIVE STATEMENT
Attending Catie Cedeño: 87 yo female presenting with constant upper abdominal pain with radiation to both arms that began last night and worse today. pain constant. no fevers or chills. denies any numbness or tingling. pain located in the middle of the abdomen. no h/o similar. does not look at her stools. no h/o similar. no prior abdominal surgeries. no headaches.

## 2022-05-23 NOTE — ED ADULT NURSE REASSESSMENT NOTE - NS ED NURSE REASSESS COMMENT FT1
Report received from Maritza CROWE . Pt AAOx4,  resp nonlabored, skin warm/dry, resting comfortably in bed. Pt awaiting for CT scan Comfort care & safety measures continued  IV site looks clean & dry no signs of infiltration noted. Pt is advised to call for help if needs something. Continue to monitor

## 2022-05-23 NOTE — ED PROVIDER NOTE - RAPID ASSESSMENT
86y F w/ PMHx of DM presents to the ED c/o constant upper back pain/ L shoulder pain radiating to abd and B/L arms starting last night that is worse w/ movement. Denies recent fall or trauma. Denies F/C, N/V/D, SOB, cough. Pt is well appearing in triage.    Rea RICHARDSON (Scribe) have documented this rapid assessment note under the dictation of Odilia Deluna (PA) which has been reviewed and affirmed to be accurate. Patient was seen as a QPA patient. The patient will be seen and further worked up in the main emergency department and their care will be completed by the main emergency department team along with a thorough physical exam. Receiving team will follow up on labs, analgesia, any clinical imaging, reassess and disposition as clinically indicated, all decisions regarding the progression of care will be made at their discretion. 86y F w/ PMHx of DM presents to the ED c/o constant upper back pain/ L shoulder pain radiating to abd and B/L arms starting last night that is worse w/ movement. Denies recent fall or trauma. Denies F/C, N/V/D, SOB, cough. Pt is well appearing in triage. patient tachycardic to 120s, requested patient be brought back to main ED for evaluation.    IRea (Scribe) have documented this rapid assessment note under the dictation of Odilia Deluna (PA) which has been reviewed and affirmed to be accurate. Patient was seen as a QPA patient. The patient will be seen and further worked up in the main emergency department and their care will be completed by the main emergency department team along with a thorough physical exam. Receiving team will follow up on labs, analgesia, any clinical imaging, reassess and disposition as clinically indicated, all decisions regarding the progression of care will be made at their discretion.    Odilia Deluna PA-C: The scribe's documentation has been prepared under my direction and personally reviewed by me in its entirety. I confirm that the note above accurately reflects history obtained.   Patient was rapidly assessed via telemedicine encounter; a limited history was obtained. The patient will be seen and further examined and worked up in the main ED and their care will be completed by the main ED team. Receiving team will follow up on labs, analgesia, any clinical imaging, and perform reassessment and disposition of the patient as clinically indicated. All decisions regarding the progression of care will be made at their discretion. 86y F w/ PMHx of DM presents to the ED c/o constant upper back pain/ L shoulder pain radiating to abd and B/L arms starting last night that is worse w/ movement. Denies recent fall or trauma. Denies F/C, N/V/D, SOB, cough. Pt is well appearing in triage. patient tachycardic to 120s, requested patient be brought back to main ED for evaluation.    IRea (Scribe) have documented this rapid assessment note under the dictation of Odilia Deluna (PA) which has been reviewed and affirmed to be accurate. Patient was seen as a QPA patient. The patient will be seen and further worked up in the main emergency department and their care will be completed by the main emergency department team along with a thorough physical exam. Receiving team will follow up on labs, analgesia, any clinical imaging, reassess and disposition as clinically indicated, all decisions regarding the progression of care will be made at their discretion.    Odilia Deluna PA-C: The scribe's documentation has been prepared under my direction and personally reviewed by me in its entirety. I confirm that the note above accurately reflects history obtained.   Patient was rapidly assessed via telemedicine encounter; a limited history was obtained. The patient will be seen and further examined and worked up in the main ED and their care will be completed by the main ED team. Receiving team will follow up on labs, analgesia, any clinical imaging, and perform reassessment and disposition of the patient as clinically indicated. All decisions regarding the progression of care will be made at their discretion.    I, Dr. Lou, didn't evaluate this pt, I was available to the MARSHA Deluna for questions on this case and no questions were asked regarding this patient.

## 2022-05-23 NOTE — ED PROVIDER NOTE - PHYSICAL EXAMINATION
Attending Catie Cedeño: Gen: NAD, heent: atrauamtic, eomi, perrla, mmm, op pink, uvula midline, neck; nttp, no nuchal rigidity, chest: nttp, no crepitus, cv: tachycardic, lungs: ctab, abd: soft, ttp epigastric area, no peritoneal signs, no guarding, ext: wwp, neg homans, skin: no rash, neuro: awake and alert, following commands, speech clear, sensation and strength intact, no focal deficits

## 2022-05-23 NOTE — ED PROVIDER NOTE - NS ED ROS FT
Constitution: No Fever or chills, No Weight Loss,   HEENT: No cough, No Discharge, No Rhinorrhea, No URI symptoms  Cardio: No Chest pain, No Palpitations, No Dyspnea  Resp: No SOB, No Wheezing  GI: (+) Diffuse abdominal pain, (+) Nausea, No Vomiting, No Constipation, No Diarrhea  : No burning upon urination, trouble urinating, no foul odor from urine  MSK: No Back pain, No Numbness, No Tingling, No Weakness  Neuro: No Headache, Normal Gait  Skin: No rashes, No Bruising, No Swelling

## 2022-05-23 NOTE — ED PROVIDER NOTE - CLINICAL SUMMARY MEDICAL DECISION MAKING FREE TEXT BOX
86 female, Hx: DM - presents with acute onset diffuse abdominal pain, with associated chest pain radiating to shoulder. Exam (Tachy, Febrile, Abd Diffuse TTP), presentation, and history concerning for suspect diverticulitis vs. intra-abdominal abscess vs. pyelo. Pt q-PA'ed and CTA Chest/Abd/Pelvis ordered from waiting room, will complete study. PLan: CBC, CMP, EKG, CXR, Lipase, Trop, UA, UCx, Re-eval. VSS, non-toxic. 86 female, Hx: DM - presents with acute onset diffuse abdominal pain, with associated chest pain radiating to shoulder. Exam (Tachy, Febrile, Abd Diffuse TTP), presentation, and history concerning for suspect diverticulitis vs. intra-abdominal abscess vs. pyelo. Pt q-PA'ed and CTA Chest/Abd/Pelvis ordered from waiting room, will complete study. PLan: CBC, CMP, EKG, CXR, Lipase, Trop, UA, UCx, Re-eval. VSS, non-toxic.  Attending Catie Cedeño: 85 yo female presenting with abdominal pain. upon arrival pt tachycardic, rectal temp with fever. on exam abdomen ttp epigastric and umbilical area. pocus without evidence of acute cholecystitis. with chest pain and abdominal discomfort. ct scans ordered to further evaluate. pt given antipyretic, and started on broad spectrum abx. ct scans showed evidence of ileitis with possible abscess. will d/w surgery. plan to admit

## 2022-05-23 NOTE — ED ADULT NURSE NOTE - INTERVENTIONS DEFINITIONS
[place] to call system/Call bell, personal items and telephone within reach/Instruct patient to call for assistance/Physically safe environment: no spills, clutter or unnecessary equipment/Monitor gait and stability/Monitor for mental status changes and reorient to person, place, and time/Reinforce activity limits and safety measures with patient and family

## 2022-05-23 NOTE — ED PROVIDER NOTE - PROGRESS NOTE DETAILS
Attending Catie Cedeño: pt with eelvated lactate; rectal temp with fever. cultures added and pt started on abx Resident Rebekah: s/p IV Abx - CT with demonstration of ileitis vs. abscess. Surg consulted. Pt made aware. Not a surgical candidate, endorsed to hospitalist for admission. Balaji Simons, EM PGY3

## 2022-05-23 NOTE — ED PROVIDER NOTE - ATTENDING CONTRIBUTION TO CARE
Attending MD Catie Cedeño:  I personally have seen and examined this patient.  Resident note reviewed and agree on plan of care and except where noted.  See HPI, PE, and MDM for details.

## 2022-05-23 NOTE — ED ADULT NURSE REASSESSMENT NOTE - NS ED NURSE REASSESS COMMENT FT1
Straight urinary catheter inserted using sterile technique. Procedure, risks, and benefits of catheter explained to patient, patient verbalized understanding. Second RN present to confirm sterility. Pt tolerated well. Urinary catheter drained 200 mL  clear carlos urine drained. Urinalysis and urine cultures obtained. Catheter removed promptly.

## 2022-05-24 ENCOUNTER — TRANSCRIPTION ENCOUNTER (OUTPATIENT)
Age: 87
End: 2022-05-24

## 2022-05-24 DIAGNOSIS — M25.512 PAIN IN LEFT SHOULDER: ICD-10-CM

## 2022-05-24 DIAGNOSIS — K50.00 CROHN'S DISEASE OF SMALL INTESTINE WITHOUT COMPLICATIONS: ICD-10-CM

## 2022-05-24 DIAGNOSIS — Z29.9 ENCOUNTER FOR PROPHYLACTIC MEASURES, UNSPECIFIED: ICD-10-CM

## 2022-05-24 DIAGNOSIS — E11.9 TYPE 2 DIABETES MELLITUS WITHOUT COMPLICATIONS: ICD-10-CM

## 2022-05-24 DIAGNOSIS — Z02.9 ENCOUNTER FOR ADMINISTRATIVE EXAMINATIONS, UNSPECIFIED: ICD-10-CM

## 2022-05-24 DIAGNOSIS — A41.9 SEPSIS, UNSPECIFIED ORGANISM: ICD-10-CM

## 2022-05-24 LAB
-  COAGULASE NEGATIVE STAPHYLOCOCCUS: SIGNIFICANT CHANGE UP
ALBUMIN SERPL ELPH-MCNC: 3.2 G/DL — LOW (ref 3.3–5)
ALP SERPL-CCNC: 69 U/L — SIGNIFICANT CHANGE UP (ref 40–120)
ALT FLD-CCNC: 10 U/L — SIGNIFICANT CHANGE UP (ref 10–45)
ANION GAP SERPL CALC-SCNC: 12 MMOL/L — SIGNIFICANT CHANGE UP (ref 5–17)
AST SERPL-CCNC: 16 U/L — SIGNIFICANT CHANGE UP (ref 10–40)
BILIRUB SERPL-MCNC: 0.8 MG/DL — SIGNIFICANT CHANGE UP (ref 0.2–1.2)
BUN SERPL-MCNC: 18 MG/DL — SIGNIFICANT CHANGE UP (ref 7–23)
CALCIUM SERPL-MCNC: 9.4 MG/DL — SIGNIFICANT CHANGE UP (ref 8.4–10.5)
CHLORIDE SERPL-SCNC: 100 MMOL/L — SIGNIFICANT CHANGE UP (ref 96–108)
CO2 SERPL-SCNC: 25 MMOL/L — SIGNIFICANT CHANGE UP (ref 22–31)
CREAT SERPL-MCNC: 0.87 MG/DL — SIGNIFICANT CHANGE UP (ref 0.5–1.3)
CULTURE RESULTS: NO GROWTH — SIGNIFICANT CHANGE UP
EGFR: 65 ML/MIN/1.73M2 — SIGNIFICANT CHANGE UP
GLUCOSE BLDC GLUCOMTR-MCNC: 226 MG/DL — HIGH (ref 70–99)
GLUCOSE BLDC GLUCOMTR-MCNC: 231 MG/DL — HIGH (ref 70–99)
GLUCOSE BLDC GLUCOMTR-MCNC: 235 MG/DL — HIGH (ref 70–99)
GLUCOSE BLDC GLUCOMTR-MCNC: 280 MG/DL — HIGH (ref 70–99)
GLUCOSE SERPL-MCNC: 261 MG/DL — HIGH (ref 70–99)
GRAM STN FLD: SIGNIFICANT CHANGE UP
HCT VFR BLD CALC: 41.6 % — SIGNIFICANT CHANGE UP (ref 34.5–45)
HGB BLD-MCNC: 13.5 G/DL — SIGNIFICANT CHANGE UP (ref 11.5–15.5)
MAGNESIUM SERPL-MCNC: 1.7 MG/DL — SIGNIFICANT CHANGE UP (ref 1.6–2.6)
MCHC RBC-ENTMCNC: 30.5 PG — SIGNIFICANT CHANGE UP (ref 27–34)
MCHC RBC-ENTMCNC: 32.5 GM/DL — SIGNIFICANT CHANGE UP (ref 32–36)
MCV RBC AUTO: 94.1 FL — SIGNIFICANT CHANGE UP (ref 80–100)
METHOD TYPE: SIGNIFICANT CHANGE UP
MRSA PCR RESULT.: SIGNIFICANT CHANGE UP
NRBC # BLD: 0 /100 WBCS — SIGNIFICANT CHANGE UP (ref 0–0)
PHOSPHATE SERPL-MCNC: 2.7 MG/DL — SIGNIFICANT CHANGE UP (ref 2.5–4.5)
PLATELET # BLD AUTO: 230 K/UL — SIGNIFICANT CHANGE UP (ref 150–400)
POTASSIUM SERPL-MCNC: 4.7 MMOL/L — SIGNIFICANT CHANGE UP (ref 3.5–5.3)
POTASSIUM SERPL-SCNC: 4.7 MMOL/L — SIGNIFICANT CHANGE UP (ref 3.5–5.3)
PROT SERPL-MCNC: 6.4 G/DL — SIGNIFICANT CHANGE UP (ref 6–8.3)
RBC # BLD: 4.42 M/UL — SIGNIFICANT CHANGE UP (ref 3.8–5.2)
RBC # FLD: 12.3 % — SIGNIFICANT CHANGE UP (ref 10.3–14.5)
S AUREUS DNA NOSE QL NAA+PROBE: SIGNIFICANT CHANGE UP
SODIUM SERPL-SCNC: 137 MMOL/L — SIGNIFICANT CHANGE UP (ref 135–145)
SPECIMEN SOURCE: SIGNIFICANT CHANGE UP
SPECIMEN SOURCE: SIGNIFICANT CHANGE UP
WBC # BLD: 11.93 K/UL — HIGH (ref 3.8–10.5)
WBC # FLD AUTO: 11.93 K/UL — HIGH (ref 3.8–10.5)

## 2022-05-24 PROCEDURE — 99222 1ST HOSP IP/OBS MODERATE 55: CPT

## 2022-05-24 PROCEDURE — 99222 1ST HOSP IP/OBS MODERATE 55: CPT | Mod: GC

## 2022-05-24 RX ORDER — ACETAMINOPHEN 500 MG
650 TABLET ORAL EVERY 6 HOURS
Refills: 0 | Status: DISCONTINUED | OUTPATIENT
Start: 2022-05-24 | End: 2022-06-06

## 2022-05-24 RX ORDER — PIPERACILLIN AND TAZOBACTAM 4; .5 G/20ML; G/20ML
3.38 INJECTION, POWDER, LYOPHILIZED, FOR SOLUTION INTRAVENOUS ONCE
Refills: 0 | Status: COMPLETED | OUTPATIENT
Start: 2022-05-24 | End: 2022-05-24

## 2022-05-24 RX ORDER — ENOXAPARIN SODIUM 100 MG/ML
40 INJECTION SUBCUTANEOUS EVERY 24 HOURS
Refills: 0 | Status: COMPLETED | OUTPATIENT
Start: 2022-05-24 | End: 2022-05-24

## 2022-05-24 RX ORDER — DEXTROSE 50 % IN WATER 50 %
15 SYRINGE (ML) INTRAVENOUS ONCE
Refills: 0 | Status: DISCONTINUED | OUTPATIENT
Start: 2022-05-24 | End: 2022-06-06

## 2022-05-24 RX ORDER — SODIUM CHLORIDE 9 MG/ML
1000 INJECTION, SOLUTION INTRAVENOUS
Refills: 0 | Status: DISCONTINUED | OUTPATIENT
Start: 2022-05-24 | End: 2022-05-24

## 2022-05-24 RX ORDER — LANOLIN ALCOHOL/MO/W.PET/CERES
3 CREAM (GRAM) TOPICAL AT BEDTIME
Refills: 0 | Status: DISCONTINUED | OUTPATIENT
Start: 2022-05-24 | End: 2022-06-06

## 2022-05-24 RX ORDER — GLUCAGON INJECTION, SOLUTION 0.5 MG/.1ML
1 INJECTION, SOLUTION SUBCUTANEOUS ONCE
Refills: 0 | Status: DISCONTINUED | OUTPATIENT
Start: 2022-05-24 | End: 2022-06-06

## 2022-05-24 RX ORDER — DEXTROSE 50 % IN WATER 50 %
12.5 SYRINGE (ML) INTRAVENOUS ONCE
Refills: 0 | Status: DISCONTINUED | OUTPATIENT
Start: 2022-05-24 | End: 2022-06-06

## 2022-05-24 RX ORDER — INSULIN LISPRO 100/ML
VIAL (ML) SUBCUTANEOUS EVERY 6 HOURS
Refills: 0 | Status: DISCONTINUED | OUTPATIENT
Start: 2022-05-24 | End: 2022-05-25

## 2022-05-24 RX ORDER — ENOXAPARIN SODIUM 100 MG/ML
40 INJECTION SUBCUTANEOUS EVERY 24 HOURS
Refills: 0 | Status: DISCONTINUED | OUTPATIENT
Start: 2022-05-24 | End: 2022-06-06

## 2022-05-24 RX ORDER — DEXTROSE 50 % IN WATER 50 %
25 SYRINGE (ML) INTRAVENOUS ONCE
Refills: 0 | Status: DISCONTINUED | OUTPATIENT
Start: 2022-05-24 | End: 2022-06-06

## 2022-05-24 RX ORDER — SODIUM CHLORIDE 9 MG/ML
1000 INJECTION, SOLUTION INTRAVENOUS
Refills: 0 | Status: DISCONTINUED | OUTPATIENT
Start: 2022-05-24 | End: 2022-06-06

## 2022-05-24 RX ORDER — ACETAMINOPHEN 500 MG
650 TABLET ORAL EVERY 6 HOURS
Refills: 0 | Status: DISCONTINUED | OUTPATIENT
Start: 2022-05-24 | End: 2022-05-24

## 2022-05-24 RX ORDER — PIPERACILLIN AND TAZOBACTAM 4; .5 G/20ML; G/20ML
3.38 INJECTION, POWDER, LYOPHILIZED, FOR SOLUTION INTRAVENOUS EVERY 8 HOURS
Refills: 0 | Status: COMPLETED | OUTPATIENT
Start: 2022-05-24 | End: 2022-05-31

## 2022-05-24 RX ADMIN — ENOXAPARIN SODIUM 40 MILLIGRAM(S): 100 INJECTION SUBCUTANEOUS at 18:19

## 2022-05-24 RX ADMIN — PIPERACILLIN AND TAZOBACTAM 25 GRAM(S): 4; .5 INJECTION, POWDER, LYOPHILIZED, FOR SOLUTION INTRAVENOUS at 14:51

## 2022-05-24 RX ADMIN — Medication 2: at 13:05

## 2022-05-24 RX ADMIN — PIPERACILLIN AND TAZOBACTAM 200 GRAM(S): 4; .5 INJECTION, POWDER, LYOPHILIZED, FOR SOLUTION INTRAVENOUS at 13:06

## 2022-05-24 RX ADMIN — SODIUM CHLORIDE 500 MILLILITER(S): 9 INJECTION INTRAMUSCULAR; INTRAVENOUS; SUBCUTANEOUS at 00:17

## 2022-05-24 RX ADMIN — Medication 2: at 18:19

## 2022-05-24 RX ADMIN — PIPERACILLIN AND TAZOBACTAM 25 GRAM(S): 4; .5 INJECTION, POWDER, LYOPHILIZED, FOR SOLUTION INTRAVENOUS at 21:24

## 2022-05-24 RX ADMIN — SODIUM CHLORIDE 50 MILLILITER(S): 9 INJECTION, SOLUTION INTRAVENOUS at 14:51

## 2022-05-24 NOTE — H&P ADULT - ATTENDING COMMENTS
Patient is an 86 y.o F w/ PMH T2DM who presents w/ abdominal pain, found to have severe sepsis int he setting of terminal ileitis and possible intramural abscess as seen on CTA.     #SEvere sepsis  -Admitted with severe sepsis (Tmax 101.2, leukocyotis, tachycardic to 116-123, 15.3% bands, lactate to 3.7); s/p total 1.5 L   -CT angio revealed distal terminal ileitis and concern for small intramural abscess   -CXR clear, UA negative, pending BCX  -f/u GI PCR and C. diff if stool sample allows  -c/w zosyn    #Terminal ileitis  -CT angio revealed distal terminal ileitis and c/f small intramural abscess  -surgery consulted; no surgical intervention warranted as abcess too small for percutaneous drainage   -GI consulted; f/u infectious work up. May consider colonoscopy if infectious work up negative  -c/w low residual diet  -see plan as above    #T2DM  -Hx of T2DM, home medicaitons include metformin  -lss, monitor FS before meals and at bedtime  -carb consistent diet   -f/u a1c    Dispo: Pending clinical improvement  order PT

## 2022-05-24 NOTE — PATIENT PROFILE ADULT - DATE OF FIRST COVID-19 BOOSTER
01-Dec-2021 Rhomboid Transposition Flap Text: The defect edges were debeveled with a #15 scalpel blade.  Given the location of the defect and the proximity to free margins a rhomboid transposition flap was deemed most appropriate.  Using a sterile surgical marker, an appropriate rhomboid flap was drawn incorporating the defect.    The area thus outlined was incised deep to adipose tissue with a #15 scalpel blade.  The skin margins were undermined to an appropriate distance in all directions utilizing iris scissors.

## 2022-05-24 NOTE — H&P ADULT - NSHPPHYSICALEXAM_GEN_ALL_CORE
Vital Signs Last 24 Hrs  T(C): 36.9 (24 May 2022 05:45), Max: 38.4 (23 May 2022 18:57)  T(F): 98.4 (24 May 2022 05:45), Max: 101.2 (23 May 2022 18:57)  HR: 84 (24 May 2022 05:45) (75 - 123)  BP: 142/65 (24 May 2022 05:45) (105/53 - 147/67)  BP(mean): --  RR: 18 (24 May 2022 05:45) (16 - 20)  SpO2: 94% (24 May 2022 05:45) (94% - 99%)    CONSTITUTIONAL: Well-groomed, in no apparent distress  EYES: No conjunctival or scleral injection, non-icteric; PERRLA and symmetric  ENMT: No external nasal lesions; nasal mucosa not inflamed; normal dentition; no pharyngeal injection or exudates, oral mucosa with moist membranes  NECK: Trachea midline without palpable neck mass; thyroid not enlarged and non-tender  RESPIRATORY: Breathing comfortably; no dullness to percussion; lungs CTA without wheeze/rhonchi/rales  CARDIOVASCULAR: +S1S2, RRR, no M/G/R; no carotid bruits; pedal pulses full and symmetric; no lower extremity edema  CHEST/BREAST: Breasts are symmetric in appearance; no palpable masses or lumps  GASTROINTESTINAL: No palpable masses or tenderness, +BS throughout, no rebound/guarding; no hepatosplenomegaly; no hernia palpated  GENITOURINARY:       FEMALE: Normal appearing external genitalia, no vaginal discharge or lesion noted; examination of urethra WNL; bladder without fullness or tenderness on palpation; palpation of uterus and adnexa without tenderness or mass  LYMPHATIC: No cervical LAD or tenderness; no axillary LAD or tenderness; no inguinal LAD or tenderness  MUSCULOSKELETAL: Normal gait and station; no digital clubbing or cyanosis; no paraspinal tenderness; examination of the  (head/neck, spine/ribs/pelvis, RUE, LUE, RLE, LLE) without misalignment, normal strength and tone of extremities  SKIN: No rashes or ulcers noted; no subcutaneous nodules or induration palpable  NEUROLOGIC: CN II-XII intact; normal reflexes in upper and lower extremities; sensation intact in LEs b/l to light touch  PSYCHIATRIC: A+O x 3; mood and affect appropriate; appropriate insight and judgment Vital Signs Last 24 Hrs  T(C): 36.9 (24 May 2022 05:45), Max: 38.4 (23 May 2022 18:57)  T(F): 98.4 (24 May 2022 05:45), Max: 101.2 (23 May 2022 18:57)  HR: 84 (24 May 2022 05:45) (75 - 123)  BP: 142/65 (24 May 2022 05:45) (105/53 - 147/67)  BP(mean): --  RR: 18 (24 May 2022 05:45) (16 - 20)  SpO2: 94% (24 May 2022 05:45) (94% - 99%)    CONSTITUTIONAL: Age-appropriate, conversive   EYES: PERRLA and symmetric  ENMT: No external nasal lesions; nasal mucosa not inflamed; normal dentition;, oral mucosa with moist membranes  RESPIRATORY: Breathing comfortably; no dullness to percussion; lungs CTA without wheeze/rhonchi/rales  CARDIOVASCULAR: +S1S2, RRR, no M/G/R  GASTROINTESTINAL: Diffuse tenderness to light and deep palpation. No palpable masses or tenderness, +BS throughout,  GENITOURINARY:       FEMALE: Normal appearing external genitalia  LYMPHATIC: No cervical LAD or tenderness; no axillary LAD or tenderness; no inguinal LAD or tenderness  MUSCULOSKELETAL: Gait deferred; normal strength and tone of extremities  SKIN: No rashes or ulcers noted; no subcutaneous nodules or induration palpable  PSYCHIATRIC: A+O x 3; mood and affect appropriate; appropriate insight and judgment

## 2022-05-24 NOTE — H&P ADULT - PROBLEM SELECTOR PLAN 2
-CT angio revealed distal terminal ileitis and c/f small intramural abscess  -surgery consulted; no surgical intervention warranted as abcess too small for percutaneous drainage   -GI consulted; f/u infectious work up. May consider colonoscopy if infectious work up negative  -c/w low residual diet  -see plan as above

## 2022-05-24 NOTE — PATIENT PROFILE ADULT - FALL HARM RISK - HARM RISK INTERVENTIONS

## 2022-05-24 NOTE — CONSULT NOTE ADULT - ATTENDING COMMENTS
Agree with above. Patient reports acute onset of abdominal pain, and was febrile on admission. She reports chronic loose stools 1-2 times a day. Reports intentional weight loss from diet changes. Suspect terminal ileitis seen on imaging is more likely infectious, less likely inflammatory or neoplastic. She is on antibiotics now, would check GI PCR to evaluate for infectious etiology. If stool workup is negative and pain/fevers continue, then would consider endoscopic evaluation (i.e. colonoscopy with ileal intubation)

## 2022-05-24 NOTE — H&P ADULT - NSHPLABSRESULTS_GEN_ALL_CORE
LABS: Personally reviewed labs, imaging, and ECG                          14.6   11.70 )-----------( 239      ( 23 May 2022 18:03 )             44.3       05-    136  |  97  |  16  ----------------------------<  331<H>  4.4   |  22  |  0.97    Ca    9.8      23 May 2022 18:03    TPro  7.1  /  Alb  3.7  /  TBili  1.1  /  DBili  x   /  AST  17  /  ALT  12  /  AlkPhos  86         LIVER FUNCTIONS - ( 23 May 2022 18:03 )  Alb: 3.7 g/dL / Pro: 7.1 g/dL / ALK PHOS: 86 U/L / ALT: 12 U/L / AST: 17 U/L / GGT: x                    Urinalysis Basic - ( 23 May 2022 22:47 )    Color: Yellow / Appearance: Clear / S.053 / pH: x  Gluc: x / Ketone: Small  / Bili: Negative / Urobili: Negative   Blood: x / Protein: 30 mg/dL / Nitrite: Negative   Leuk Esterase: Negative / RBC: 2 /hpf / WBC 1 /HPF   Sq Epi: x / Non Sq Epi: 3 /hpf / Bacteria: Negative        PT/INR - ( 23 May 2022 21:33 )   PT: 16.3 sec;   INR: 1.40 ratio         PTT - ( 23 May 2022 21:33 )  PTT:24.8 sec    Lactate Trend            CAPILLARY BLOOD GLUCOSE                RADIOLOGY & ADDITIONAL TESTS:  BOWEL: There is wall thickening and inflammatory changes involving the   distal through the terminal ileum with a low-attenuation peripherally   enhancing intramural structure measuring at least 1.2 x 1.1 cm best seen   on 5:203, concerning for an abscess. Moderate-sized hiatal hernia. No   bowel obstruction. Appendix is normal. Scattered sigmoid colon   diverticula without evidence for diverticulitis.  PERITONEUM: Trace free fluid in the pelvis.  VESSELS: No penetrating aortic ulcer or aortic dissection. No aneurysm.   Atherosclerotic calcifications of the aortoiliac system. Severe   atherosclerotic narrowing at the celiac and superior mesenteric artery   origins. Mild narrowing at the bilateral renal and inferior mesenteric   artery origins. Otherwise, the vessels are patent.  RETROPERITONEUM/LYMPH NODES: No lymphadenopathy.  ABDOMINAL WALL: Within normal limits.  BONES: Degenerative changes.    IMPRESSION:    No CT evidence for acute aortic syndrome.    Findings of distal/terminal ileitis with concern for small intramural   abscess, as described above.

## 2022-05-24 NOTE — CONSULT NOTE ADULT - ASSESSMENT
Ms. Lam is an 86 year old woman with terminal ileitis and concern for small intramural abscess.    Recommendations  - no acute surgical intervention indicated  - abscess likely too small for percutaneous drainage  - recommend medicine admission and GI consult  - continue antibiotics    discussed with chief resident on call    Luisa Meredith, PGY2  [facility] Surgery  [phone] Ms. Lam is an 86 year old woman with terminal ileitis and concern for small intramural abscess.    Recommendations  - no acute surgical intervention indicated  - abscess likely too small for percutaneous drainage  - recommend medicine admission and GI consult  - continue antibiotics    discussed with attending, Dr. Sarmad Meredith, PGY2  [facility] Surgery  [phone]

## 2022-05-24 NOTE — H&P ADULT - PROBLEM SELECTOR PLAN 4
DVT prophylaxis: lovenox daily   HTN: bps elevated to sbp 140s, likely undiagnosed HTN. Will continue to monitor for now at this time PMH T2DM, home medications include metformin  -c/w lss  -FS before meals and at bedtime   -f/u A1c  -carb consistent low residual diet PMH T2DM, home medications include metformin  -To acquire formal med rec   -c/w lss  -FS before meals and at bedtime   -f/u A1c  -carb consistent low residual diet  -to convert to liquids if GI determines patient indicated for scope

## 2022-05-24 NOTE — H&P ADULT - NSHPREVIEWOFSYSTEMS_GEN_ALL_CORE
General: No fevers, chills, fatigue, weight loss  HEENT: No headaches, acute visual changes, rhinorrhea, sore throat, dysphagia  CVS: No chest pain, palpitations  Resp: No cough, dyspnea, wheezing  GI: No abdominal pain, nausea, vomiting, constipation, diarrhea, hematochezia, melena  : No dysuria, frequency, hematuria, or discharge  MSK: No joint pain or swelling  Ext: No edema, no claudication  Skin: No rashes or itching  Heme: No easy bruising or petechiae  Neuro: No confusion, numbness, focal weakness, or loss of consciousness  Endocrine: No excessive heat or cold symptoms, polyuria, polydipsia General: No fevers, chills, fatigue, weight loss  HEENT: No headaches, acute visual changes, rhinorrhea, sore throat, dysphagia  CVS: No chest pain, palpitations  Resp: No cough, dyspnea, wheezing  GI: Diffuse abdominal pain  : Chronic frequency, uses pad No dysuria,  hematuria, or discharge  MSK: No joint pain or swelling  Ext: Shoulder pain   Heme: No easy bruising or petechiae  Neuro: No confusion, numbness, focal weakness, or loss of consciousness  Endocrine: No excessive heat or cold symptoms, polyuria, polydipsia

## 2022-05-24 NOTE — CONSULT NOTE ADULT - ASSESSMENT
86F presenting w/ abd pain. Found to have inflammation and possible abscess in terminal ileum. No chronic GI symptoms at baseline.    Impression:  #Terminal ileitis - D/dx most likely infectious given acute onset. Less likely inflammatory or malignant.    Recommendations:  - Check GI PCR and C Diff.  - F/u BCx.  - C/w IV Abx  - Low residue diet for now.  - Based on above and clinical course, will consider colonoscopy later this week if infectious work-up is negative.    GI will continue to follow.     Recommendations preliminary until signed by attending.     Nadeem Sawant  Gastroenterology/Hepatology Fellow  Available via [employer] Teams    NON-URGENT CONSULTS:  Please email [email or web address]  AT NIGHT AND ON WEEKENDS:  Contact on-call GI fellow via answering service ([phone]) from 5pm-8am and on weekends/holidays  MONDAY-FRIDAY 8AM-5PM:  Pager# [phone] ([facility]) or [phone] ([facility])  GI Phone# [phone] ([facility])     86F presenting w/ abd pain. Found to have inflammation and possible abscess in terminal ileum. No chronic GI symptoms at baseline.    Impression:  #Terminal ileitis - D/dx most likely infectious given acute onset. Less likely inflammatory or malignant.    Recommendations:  - Check GI PCR and C Diff.  - F/u BCx.  - IV Abx per primary team  - Low residue diet for now.  - Based on above and clinical course, will consider colonoscopy later this week if infectious work-up is negative.    GI will continue to follow.     Recommendations preliminary until signed by attending.     Nadeem Sawant  Gastroenterology/Hepatology Fellow  Available via [employer] Teams    NON-URGENT CONSULTS:  Please email [email or web address]  AT NIGHT AND ON WEEKENDS:  Contact on-call GI fellow via answering service ([phone]) from 5pm-8am and on weekends/holidays  MONDAY-FRIDAY 8AM-5PM:  Pager# [phone] ([facility]) or [phone] ([facility])  GI Phone# [phone] ([facility])

## 2022-05-24 NOTE — H&P ADULT - ASSESSMENT
Patient is an 86 y.o F w/ PMH T2DM who presents w/ abdominal pain, found to have severe sepsis int he setting of terminal ileitis and possible intramural abscess as seen on CTA.  Patient is an 86 y.o F w/ PMH T2DM who presents w/ abdominal and shoulder pain, found to have severe sepsis int he setting of terminal ileitis and possible intramural abscess as seen on CTA.  Patient is an 86 y.o F w/ PMH T2DM who presents w/ abdominal and shoulder pain, found to have severe sepsis int he setting of terminal ileitis and possible intramural abscess as seen on CTA. Pending infectious disease workup and possible scope by GI later in the week

## 2022-05-24 NOTE — CONSULT NOTE ADULT - SUBJECTIVE AND OBJECTIVE BOX
General Surgery Consult Note    History of Present Illness  Ms. Lam is an 86 year old woman with PMHx DM (does not see doctors often) presenting with one day of sudden onset abdominal pain. She reports two days of decreased PO intake and nausea, without emesis. She denies fevers or chills during this time. She reports passing gas. She has diarrhea however reports that this is normal for her. She had a colonoscopy many years ago but does not recall what it showed.     Upon presentation to the ED patient is febrile to 101F, tachycardic to 120, and normotensive. She received 1L bolus with improvement in HR. Labs are notable for WBC11, lactate 3.7 --> 2.6 with bolus. Patient received Vancomycin and Zosyn in the ED. CTAP demonstrating terminal ileitis with 1.2x 1.1cm intramural structure concerning for abscess.       PAST MEDICAL HISTORY: Diabetes        PAST SURGICAL HISTORY: No significant past surgical history    S/P TKR (total knee replacement)        HOME MEDICATIONS:    ALLERGIES: No Known Allergies      FAMILY HISTORY: Family history of breast cancer in mother (Mother)        SOCIAL HISTORY:    REVIEW OF SYSTEMS:    VITAL SIGNS:  ICU Vital Signs Last 24 Hrs  T(C): 37.8 (24 May 2022 02:53), Max: 38.4 (23 May 2022 18:57)  T(F): 100.1 (24 May 2022 02:53), Max: 101.2 (23 May 2022 18:57)  HR: 78 (24 May 2022 02:53) (75 - 123)  BP: 147/67 (24 May 2022 02:53) (105/53 - 147/67)  BP(mean): --  ABP: --  ABP(mean): --  RR: 20 (24 May 2022 02:53) (16 - 20)  SpO2: 95% (24 May 2022 02:53) (94% - 96%)      PHYSICAL EXAMINATION:  General - well-nourished, no acute distress, resting in ED with eye mask on  Neuro - awake, alert, oriented x4, no acute focal deficits  HEENT - normocephalic, PERRL, moist mucous membranes  Lungs - breathing comfortably on room air  Heart - pulse regular  Abdomen - obese, soft, tender to deep palpation in right abdomen  Extremities - all four extremities are warm & pink with 2+ pulses, strength 5/5, sensation intact    LABS:                          14.6   11.70 )-----------( 239      ( 23 May 2022 18:03 )             44.3       05-23    136  |  97  |  16  ----------------------------<  331<H>  4.4   |  22  |  0.97    Ca    9.8      23 May 2022 18:03    TPro  7.1  /  Alb  3.7  /  TBili  1.1  /  DBili  x   /  AST  17  /  ALT  12  /  AlkPhos  86  05-23      PT/INR - ( 23 May 2022 21:33 )   PT: 16.3 sec;   INR: 1.40 ratio         PTT - ( 23 May 2022 21:33 )  PTT:24.8 sec        VBG - ( 23 May 2022 21:25 )  pH: 7.36  /  pCO2: 50    /  pO2: 29    / HCO3: 28    / Base Excess: 1.9   /  SaO2: 47.3   Lactate: 2.6          IMAGING STUDIES:  < from: CT Angio Chest Aorta w/wo IV Cont (05.23.22 @ 21:04) >  INTERPRETATION:  CLINICAL INFORMATION: Chest pain radiating to the   abdomen.    TECHNIQUE:  CT angiogram of the chest, abdomen and pelvis was performed utilizing the   aortic dissection protocol axial noncontrast CT of the chest followed by   contrast-enhanced imaging of the chest, abdomen, and pelvis. The study   was not gated. Three-dimensional maximum intensity projection images were   generated. 100 cc of Omnipaque 350 was administered. 0 cc was discarded.    COMPARISON STUDY: CT abdomen and pelvis 12/18/2019.    FINDINGS:  CHEST:  LUNGS AND LARGE AIRWAYS: Patent central airways. Bibasilar subsegmental   atelectasis. Few scattered nonspecific < 6 mm pulmonary nodules   bilaterally.  PLEURA: No pleural effusion. No pneumothorax.  VESSELS: No intramural hematoma, penetrating aortic ulcer or aortic   dissection. Triple-vessel coronary artery calcifications. Minimal   scattered atherosclerotic calcifications of the aorta.  HEART: Heart size is normal. No pericardial effusion.  MEDIASTINUM AND BRY: No lymphadenopathy.  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    ABDOMEN AND PELVIS:  LIVER: Within normal limits.  BILE DUCTS: Normal caliber.  GALLBLADDER: Within normal limits.  SPLEEN: Within normal limits.  PANCREAS: Within normal limits.  ADRENALS: Thickened adrenal glands, unchanged.  KIDNEYS/URETERS: Right peripelvic renal cyst. Mild nonspecific bilateral   perinephric fat stranding. No hydronephrosis.    BLADDER: Within normal limits.  REPRODUCTIVE ORGANS: Hysterectomy.    BOWEL: There is wall thickening and inflammatory changes involving the   distal through the terminal ileum with a low-attenuation peripherally   enhancing intramural structure measuring at least 1.2 x 1.1 cm best seen   on 5:203, concerning for an abscess. Moderate-sized hiatal hernia. No   bowel obstruction. Appendix is normal. Scattered sigmoid colon   diverticula without evidence for diverticulitis.  PERITONEUM: Trace free fluid in the pelvis.  VESSELS: No penetrating aortic ulcer or aortic dissection. No aneurysm.   Atherosclerotic calcifications of the aortoiliac system. Severe   atherosclerotic narrowing at the celiac and superior mesenteric artery   origins. Mild narrowing at the bilateral renal and inferior mesenteric   artery origins. Otherwise, the vessels are patent.  RETROPERITONEUM/LYMPH NODES: No lymphadenopathy.  ABDOMINAL WALL: Within normal limits.  BONES: Degenerative changes.    IMPRESSION:    No CT evidence for acute aortic syndrome.    Findings of distal/terminal ileitis with concern for small intramural   abscess, as described above.

## 2022-05-24 NOTE — H&P ADULT - PROBLEM SELECTOR PLAN 5
Dispo: pending clinical improvement  f/u PT DVT prophylaxis: lovenox daily   HTN: bps elevated to sbp 140s, likely undiagnosed HTN. Will continue to monitor for now at this time DVT prophylaxis: lovenox daily   Diet: fiber diet   Dispo: pending PT   HTN: bps elevated to sbp 140s, likely undiagnosed HTN. Will continue to monitor for now at this time

## 2022-05-24 NOTE — H&P ADULT - HISTORY OF PRESENT ILLNESS
Ms. Lam is an 86 year old woman with PMHx DM (does not see doctors often) presenting with one day of sudden onset abdominal pain. She reports two days of decreased PO intake and nausea, without emesis. She denies fevers or chills during this time. She reports passing gas. She has diarrhea however reports that this is normal for her. She had a colonoscopy many years ago but does not recall what it showed.     Upon presentation to the ED patient is febrile to 101F, tachycardic to 120, and normotensive. She received 1L bolus with improvement in HR. Labs are notable for WBC11, lactate 3.7 --> 2.6 with bolus. Patient received Vancomycin and Zosyn in the ED. CTAP demonstrating terminal ileitis with 1.2x 1.1cm intramural structure concerning for abscess.     Surgery stated no acute surgical intervention indicated given that abscess too small for drainage. Recommended GI consult and continued antibiotics.     Ms. Lam is an 86 year old woman with PMHx DM (does not see doctors often) presenting with one day of sudden onset abdominal pain. She reports two days of decreased PO intake and nausea, without emesis. The was exacerbated when she reclined on her Lazy-Boy chair. She did not take any pain reliving medications. She denies fevers or chills during this time. She reports passing gas. She has diarrhea however reports that this is normal for her. She had a colonoscopy many years ago but does not recall what it showed.     Upon presentation to the ED patient is febrile to 101F, tachycardic to 120, and normotensive. She received 1L bolus with improvement in HR. Labs are notable for WBC11, lactate 3.7 --> 2.6 with bolus. Patient received Vancomycin and Zosyn in the ED. CTAP demonstrating terminal ileitis with 1.2x 1.1cm intramural structure concerning for abscess.     Surgery stated no acute surgical intervention indicated given that abscess too small for drainage. Recommended GI consult and continued antibiotics.     86W with h/o DM presented with one day of abdominal pain. This pain was exacerbated when she reclined on her Lazy-Boy chair. She did not take any pain relieving medications. She denies fevers or chills during this time. She also endorsed a shoulder pain that she endorses has been chronic. Denies fever, chills, n/v/d. Had a previous colonoscopy but unclear as to result.     ED course: Patient with elevated T at 101F and tachycardia at 120. She received 1L bolus with improvement in HR. Received Vanco x1 and zosyn x1. CTAP demonstrated findings of distal/terminal ileitis with concern for small intramural abscess. Lactate downtrending. Surgery stated no acute surgical intervention indicated given that abscess too small for drainage. Recommended GI consult and continued antibiotics.

## 2022-05-24 NOTE — CONSULT NOTE ADULT - SUBJECTIVE AND OBJECTIVE BOX
Chief Complaint:  Patient is a 86y old  Female who presents with a chief complaint of Terminal Ileitis (24 May 2022 07:16)    HPI:DONNA PEREZ is a 86y Female with a history of DMII on oral meds presenting w/ abd pain. Patient says she has 2 loose stools per day at baseline but is otherwise doing well. She states she was sitting in her recliner when she had acute onset, band-like abd pain around her lower abdomen. No associated sx - denies nausea, vomiting, f/c, change in stool habits, melena, hematochezia. None of these sx at baseline. Denies dysphagia or low appetite or early satiety. No prior endoscopic evaluation. No FHx of GI cancers or IBD. No joint pain eye pain rash.    PMHX/PSHX:  Diabetes    No significant past surgical history    S/P TKR (total knee replacement)      Allergies:  No Known Allergies      Home Medications: reviewed  Hospital Medications:  acetaminophen     Tablet .. 650 milliGRAM(s) Oral every 6 hours PRN  aluminum hydroxide/magnesium hydroxide/simethicone Suspension 30 milliLiter(s) Oral every 4 hours PRN  dextrose 5%. 1000 milliLiter(s) IV Continuous <Continuous>  dextrose 5%. 1000 milliLiter(s) IV Continuous <Continuous>  dextrose 50% Injectable 25 Gram(s) IV Push once  dextrose 50% Injectable 12.5 Gram(s) IV Push once  dextrose 50% Injectable 25 Gram(s) IV Push once  dextrose Oral Gel 15 Gram(s) Oral once PRN  enoxaparin Injectable 40 milliGRAM(s) SubCutaneous every 24 hours  glucagon  Injectable 1 milliGRAM(s) IntraMuscular once  insulin lispro (ADMELOG) corrective regimen sliding scale   SubCutaneous every 6 hours  lactated ringers. 1000 milliLiter(s) IV Continuous <Continuous>  melatonin 3 milliGRAM(s) Oral at bedtime PRN  piperacillin/tazobactam IVPB.. 3.375 Gram(s) IV Intermittent every 8 hours      Social History:   Tob: Denies  EtOH: Denies  Illicit Drugs: Denies    Family history:  Family history of breast cancer in mother (Mother)      Denies family history of colon cancer/polyps, stomach cancer/polyps, pancreatic cancer/masses, liver cancer/disease, ovarian cancer and endometrial cancer.    ROS:   [x] 14 point ROS negative other than as above  [ ] Patient unable to provide    PHYSICAL EXAM:   Vital Signs:  Vital Signs Last 24 Hrs  T(C): 36.6 (24 May 2022 13:57), Max: 38.4 (23 May 2022 18:57)  T(F): 97.8 (24 May 2022 13:57), Max: 101.2 (23 May 2022 18:57)  HR: 73 (24 May 2022 13:57) (73 - 123)  BP: 141/61 (24 May 2022 13:57) (105/53 - 147/67)  BP(mean): --  RR: 18 (24 May 2022 13:57) (16 - 20)  SpO2: 93% (24 May 2022 13:57) (93% - 99%)  Daily Height in cm: 157.48 (23 May 2022 17:25)    Daily     GENERAL: no acute distress  NEURO: alert  HEENT: anicteric sclera  CHEST: no respiratory distress, no accessory muscle use  ABDOMEN: soft, non-tender, non-distended, no rebound or guarding  EXTREMITIES: warm, well perfused, no edema  SKIN: no jaundice    LABS: reviewed                        13.5   11.93 )-----------( 230      ( 24 May 2022 11:20 )             41.6     05-24    137  |  100  |  18  ----------------------------<  261<H>  4.7   |  25  |  0.87    Ca    9.4      24 May 2022 11:20  Phos  2.7     05-24  Mg     1.7     05-24    TPro  6.4  /  Alb  3.2<L>  /  TBili  0.8  /  DBili  x   /  AST  16  /  ALT  10  /  AlkPhos  69  05-24    LIVER FUNCTIONS - ( 24 May 2022 11:20 )  Alb: 3.2 g/dL / Pro: 6.4 g/dL / ALK PHOS: 69 U/L / ALT: 10 U/L / AST: 16 U/L / GGT: x               Diagnostic Studies: see sunrise for full report         Chief Complaint:  Patient is a 86y old  Female who presents with a chief complaint of Terminal Ileitis (24 May 2022 07:16)    HPI:DONNA PEREZ is a 86y Female with a history of DMII on oral meds presenting w/ abd pain. Patient says she has 2 loose stools per day at baseline but is otherwise doing well. She states she was sitting in her recliner when she had acute onset, band-like abd pain around her lower abdomen day of admission. No associated sx - denies nausea, vomiting, f/c, change in stool habits, melena, hematochezia. Reports chornic loose stools 1-2 times a day. None of these sx at baseline. Denies dysphagia or low appetite or early satiety. No prior endoscopic evaluation. No FHx of GI cancers or IBD. No joint pain eye pain rash. Reports having lost 30 lbs recently because she "cut out bread and potatoes."    PMHX/PSHX:  Diabetes    No significant past surgical history    S/P TKR (total knee replacement)      Allergies:  No Known Allergies      Home Medications: reviewed  Hospital Medications:  acetaminophen     Tablet .. 650 milliGRAM(s) Oral every 6 hours PRN  aluminum hydroxide/magnesium hydroxide/simethicone Suspension 30 milliLiter(s) Oral every 4 hours PRN  dextrose 5%. 1000 milliLiter(s) IV Continuous <Continuous>  dextrose 5%. 1000 milliLiter(s) IV Continuous <Continuous>  dextrose 50% Injectable 25 Gram(s) IV Push once  dextrose 50% Injectable 12.5 Gram(s) IV Push once  dextrose 50% Injectable 25 Gram(s) IV Push once  dextrose Oral Gel 15 Gram(s) Oral once PRN  enoxaparin Injectable 40 milliGRAM(s) SubCutaneous every 24 hours  glucagon  Injectable 1 milliGRAM(s) IntraMuscular once  insulin lispro (ADMELOG) corrective regimen sliding scale   SubCutaneous every 6 hours  lactated ringers. 1000 milliLiter(s) IV Continuous <Continuous>  melatonin 3 milliGRAM(s) Oral at bedtime PRN  piperacillin/tazobactam IVPB.. 3.375 Gram(s) IV Intermittent every 8 hours      Social History:   Tob: Denies  EtOH: Denies  Illicit Drugs: Denies    Family history:  Family history of breast cancer in mother (Mother)      Denies family history of colon cancer/polyps, stomach cancer/polyps, pancreatic cancer/masses, liver cancer/disease, ovarian cancer and endometrial cancer.    ROS:   [x] 14 point ROS negative other than as above  [ ] Patient unable to provide    PHYSICAL EXAM:   Vital Signs:  Vital Signs Last 24 Hrs  T(C): 36.6 (24 May 2022 13:57), Max: 38.4 (23 May 2022 18:57)  T(F): 97.8 (24 May 2022 13:57), Max: 101.2 (23 May 2022 18:57)  HR: 73 (24 May 2022 13:57) (73 - 123)  BP: 141/61 (24 May 2022 13:57) (105/53 - 147/67)  BP(mean): --  RR: 18 (24 May 2022 13:57) (16 - 20)  SpO2: 93% (24 May 2022 13:57) (93% - 99%)  Daily Height in cm: 157.48 (23 May 2022 17:25)    Daily     GENERAL: no acute distress  NEURO: alert and oriented x 3  HEENT: anicteric sclera  CHEST: no respiratory distress, no accessory muscle use  CARDIAC: S1+S2  ABDOMEN: soft, non-tender, non-distended, no rebound or guarding  EXTREMITIES: warm, well perfused, no edema  SKIN: no jaundice  PSYCH: normal affect    LABS: reviewed                        13.5   11.93 )-----------( 230      ( 24 May 2022 11:20 )             41.6     05-24    137  |  100  |  18  ----------------------------<  261<H>  4.7   |  25  |  0.87    Ca    9.4      24 May 2022 11:20  Phos  2.7     05-24  Mg     1.7     05-24    TPro  6.4  /  Alb  3.2<L>  /  TBili  0.8  /  DBili  x   /  AST  16  /  ALT  10  /  AlkPhos  69  05-24    LIVER FUNCTIONS - ( 24 May 2022 11:20 )  Alb: 3.2 g/dL / Pro: 6.4 g/dL / ALK PHOS: 69 U/L / ALT: 10 U/L / AST: 16 U/L / GGT: x               Diagnostic Studies: see sunrise for full report  < from: CT Angio Abdomen and Pelvis w/ IV Cont (05.23.22 @ 21:04) >    FINDINGS:  CHEST:  LUNGS AND LARGE AIRWAYS: Patent central airways. Bibasilar subsegmental   atelectasis. Few scattered nonspecific < 6 mm pulmonary nodules   bilaterally.  PLEURA: No pleural effusion. No pneumothorax.  VESSELS: No intramural hematoma, penetrating aortic ulcer or aortic   dissection. Triple-vessel coronary artery calcifications. Minimal   scattered atherosclerotic calcifications of the aorta.  HEART: Heart size is normal. No pericardial effusion.  MEDIASTINUM AND BRY: No lymphadenopathy.  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    ABDOMEN AND PELVIS:  LIVER: Within normal limits.  BILE DUCTS: Normal caliber.  GALLBLADDER: Within normal limits.  SPLEEN: Within normal limits.  PANCREAS: Within normal limits.  ADRENALS: Thickened adrenal glands, unchanged.  KIDNEYS/URETERS: Right peripelvic renal cyst. Mild nonspecific bilateral   perinephric fat stranding. No hydronephrosis.    BLADDER: Within normal limits.  REPRODUCTIVE ORGANS: Hysterectomy.    BOWEL: There is wall thickening and inflammatory changes involving the   distal through the terminal ileum with a low-attenuation peripherally   enhancing intramural structure measuring at least 1.2 x 1.1 cm best seen   on 5:203, concerning for an abscess. Moderate-sized hiatal hernia. No   bowel obstruction. Appendix is normal. Scattered sigmoid colon   diverticula without evidence for diverticulitis.  PERITONEUM: Trace free fluid in the pelvis.  VESSELS: No penetrating aortic ulcer or aortic dissection. No aneurysm.   Atherosclerotic calcifications of the aortoiliac system. Severe   atherosclerotic narrowing at the celiac and superior mesenteric artery   origins. Mild narrowing at the bilateral renal and inferior mesenteric   artery origins. Otherwise, the vessels are patent.  RETROPERITONEUM/LYMPH NODES: No lymphadenopathy.  ABDOMINAL WALL: Within normal limits.  BONES: Degenerative changes.    IMPRESSION:    No CT evidence for acute aortic syndrome.    Findings of distal/terminal ileitis with concern for small intramural   abscess, as described above.    < end of copied text >

## 2022-05-24 NOTE — H&P ADULT - PROBLEM SELECTOR PLAN 1
Admitted with severe sepsis (Tmax 101.2, leukocyotis, tachycardic to 116-123, 15.3% bands, lactate to 3.7); s/p total 1.5 L   -CT angio revealed distal terminal ileitis and concern for small intramural abscess   -CXR clear, UA negative, pending BCX  -f/u GI PCR and C. diff if stool sample allows  -c/w zosyn Admitted with severe sepsis (Tmax 101.2, leukocytosis, tachycardic to 116-123, 15.3% bands, lactate to 3.7); s/p total 1.5 L   -CT angio revealed distal terminal ileitis and concern for small intramural abscess   -CXR clear, UA negative, pending BCX  -f/u GI PCR and C. diff if stool sample allows  -c/w zosyn  -BCx positive for gram positive cocci, will repeat BCx in AM

## 2022-05-24 NOTE — H&P ADULT - NSHPSOCIALHISTORY_GEN_ALL_CORE
Marital Status:  Living Situation:  Occupation:  Tobacco Use:  Alcohol Use:  Drug Use:  Sexual History: Marital Status:    Living Situation: Lives alone   Occupation: Retired   Tobacco Use: None   Alcohol Use: None   Drug Use: None

## 2022-05-24 NOTE — H&P ADULT - PROBLEM SELECTOR PLAN 3
PMH T2DM, home medications include metformin  -c/w lss  -FS before meals and at bedtime   -f/u A1c  -carb consistent low residual diet Endorses b/l shoulder and upper back pain   -CTA negative for aortic dissection   -differential includes MSK vs. polymyalgia rheumatica  -f/u ESR-although may  be high in the setting of infection   -tylenol PRN for symptom control Endorses b/l shoulder and upper back pain   -CTA negative for aortic dissection   -differential includes MSK vs. polymyalgia rheumatica  -f/u ESR AM-although may  be high in the setting of infection   -tylenol PRN for symptom control

## 2022-05-25 LAB
A1C WITH ESTIMATED AVERAGE GLUCOSE RESULT: 9.3 % — HIGH (ref 4–5.6)
ANION GAP SERPL CALC-SCNC: 13 MMOL/L — SIGNIFICANT CHANGE UP (ref 5–17)
BUN SERPL-MCNC: 21 MG/DL — SIGNIFICANT CHANGE UP (ref 7–23)
CALCIUM SERPL-MCNC: 9 MG/DL — SIGNIFICANT CHANGE UP (ref 8.4–10.5)
CHLORIDE SERPL-SCNC: 100 MMOL/L — SIGNIFICANT CHANGE UP (ref 96–108)
CO2 SERPL-SCNC: 23 MMOL/L — SIGNIFICANT CHANGE UP (ref 22–31)
CREAT SERPL-MCNC: 0.96 MG/DL — SIGNIFICANT CHANGE UP (ref 0.5–1.3)
CULTURE RESULTS: SIGNIFICANT CHANGE UP
EGFR: 58 ML/MIN/1.73M2 — LOW
ERYTHROCYTE [SEDIMENTATION RATE] IN BLOOD: 109 MM/HR — HIGH (ref 0–20)
ESTIMATED AVERAGE GLUCOSE: 220 MG/DL — HIGH (ref 68–114)
GLUCOSE BLDC GLUCOMTR-MCNC: 209 MG/DL — HIGH (ref 70–99)
GLUCOSE BLDC GLUCOMTR-MCNC: 247 MG/DL — HIGH (ref 70–99)
GLUCOSE BLDC GLUCOMTR-MCNC: 253 MG/DL — HIGH (ref 70–99)
GLUCOSE BLDC GLUCOMTR-MCNC: 274 MG/DL — HIGH (ref 70–99)
GLUCOSE SERPL-MCNC: 209 MG/DL — HIGH (ref 70–99)
HCT VFR BLD CALC: 36.7 % — SIGNIFICANT CHANGE UP (ref 34.5–45)
HGB BLD-MCNC: 12.1 G/DL — SIGNIFICANT CHANGE UP (ref 11.5–15.5)
MAGNESIUM SERPL-MCNC: 1.7 MG/DL — SIGNIFICANT CHANGE UP (ref 1.6–2.6)
MCHC RBC-ENTMCNC: 31.7 PG — SIGNIFICANT CHANGE UP (ref 27–34)
MCHC RBC-ENTMCNC: 33 GM/DL — SIGNIFICANT CHANGE UP (ref 32–36)
MCV RBC AUTO: 96.1 FL — SIGNIFICANT CHANGE UP (ref 80–100)
NRBC # BLD: 0 /100 WBCS — SIGNIFICANT CHANGE UP (ref 0–0)
ORGANISM # SPEC MICROSCOPIC CNT: SIGNIFICANT CHANGE UP
ORGANISM # SPEC MICROSCOPIC CNT: SIGNIFICANT CHANGE UP
PHOSPHATE SERPL-MCNC: 2.6 MG/DL — SIGNIFICANT CHANGE UP (ref 2.5–4.5)
PLATELET # BLD AUTO: 207 K/UL — SIGNIFICANT CHANGE UP (ref 150–400)
POTASSIUM SERPL-MCNC: 3.8 MMOL/L — SIGNIFICANT CHANGE UP (ref 3.5–5.3)
POTASSIUM SERPL-SCNC: 3.8 MMOL/L — SIGNIFICANT CHANGE UP (ref 3.5–5.3)
RBC # BLD: 3.82 M/UL — SIGNIFICANT CHANGE UP (ref 3.8–5.2)
RBC # FLD: 12.5 % — SIGNIFICANT CHANGE UP (ref 10.3–14.5)
SODIUM SERPL-SCNC: 136 MMOL/L — SIGNIFICANT CHANGE UP (ref 135–145)
SPECIMEN SOURCE: SIGNIFICANT CHANGE UP
TSH SERPL-MCNC: 1.75 UIU/ML — SIGNIFICANT CHANGE UP (ref 0.27–4.2)
WBC # BLD: 12.47 K/UL — HIGH (ref 3.8–10.5)
WBC # FLD AUTO: 12.47 K/UL — HIGH (ref 3.8–10.5)

## 2022-05-25 PROCEDURE — 99232 SBSQ HOSP IP/OBS MODERATE 35: CPT

## 2022-05-25 PROCEDURE — 99232 SBSQ HOSP IP/OBS MODERATE 35: CPT | Mod: GC

## 2022-05-25 RX ORDER — INSULIN LISPRO 100/ML
VIAL (ML) SUBCUTANEOUS AT BEDTIME
Refills: 0 | Status: DISCONTINUED | OUTPATIENT
Start: 2022-05-25 | End: 2022-06-06

## 2022-05-25 RX ORDER — INSULIN LISPRO 100/ML
VIAL (ML) SUBCUTANEOUS
Refills: 0 | Status: DISCONTINUED | OUTPATIENT
Start: 2022-05-25 | End: 2022-06-06

## 2022-05-25 RX ORDER — INSULIN GLARGINE 100 [IU]/ML
10 INJECTION, SOLUTION SUBCUTANEOUS AT BEDTIME
Refills: 0 | Status: ACTIVE | OUTPATIENT
Start: 2022-05-25 | End: 2023-04-23

## 2022-05-25 RX ORDER — INSULIN LISPRO 100/ML
2 VIAL (ML) SUBCUTANEOUS
Refills: 0 | Status: DISCONTINUED | OUTPATIENT
Start: 2022-05-25 | End: 2022-05-27

## 2022-05-25 RX ORDER — POTASSIUM CHLORIDE 20 MEQ
40 PACKET (EA) ORAL ONCE
Refills: 0 | Status: COMPLETED | OUTPATIENT
Start: 2022-05-25 | End: 2022-05-25

## 2022-05-25 RX ORDER — METFORMIN HYDROCHLORIDE 850 MG/1
0 TABLET ORAL
Qty: 0 | Refills: 0 | DISCHARGE

## 2022-05-25 RX ORDER — MAGNESIUM OXIDE 400 MG ORAL TABLET 241.3 MG
400 TABLET ORAL ONCE
Refills: 0 | Status: COMPLETED | OUTPATIENT
Start: 2022-05-25 | End: 2022-05-25

## 2022-05-25 RX ORDER — SODIUM,POTASSIUM PHOSPHATES 278-250MG
2 POWDER IN PACKET (EA) ORAL ONCE
Refills: 0 | Status: COMPLETED | OUTPATIENT
Start: 2022-05-25 | End: 2022-05-25

## 2022-05-25 RX ADMIN — PIPERACILLIN AND TAZOBACTAM 25 GRAM(S): 4; .5 INJECTION, POWDER, LYOPHILIZED, FOR SOLUTION INTRAVENOUS at 21:21

## 2022-05-25 RX ADMIN — PIPERACILLIN AND TAZOBACTAM 25 GRAM(S): 4; .5 INJECTION, POWDER, LYOPHILIZED, FOR SOLUTION INTRAVENOUS at 12:52

## 2022-05-25 RX ADMIN — PIPERACILLIN AND TAZOBACTAM 25 GRAM(S): 4; .5 INJECTION, POWDER, LYOPHILIZED, FOR SOLUTION INTRAVENOUS at 04:58

## 2022-05-25 RX ADMIN — Medication 1: at 22:26

## 2022-05-25 RX ADMIN — Medication 3: at 18:05

## 2022-05-25 RX ADMIN — MAGNESIUM OXIDE 400 MG ORAL TABLET 400 MILLIGRAM(S): 241.3 TABLET ORAL at 12:51

## 2022-05-25 RX ADMIN — Medication 2: at 00:15

## 2022-05-25 RX ADMIN — Medication 2: at 12:47

## 2022-05-25 RX ADMIN — Medication 2 UNIT(S): at 18:06

## 2022-05-25 RX ADMIN — ENOXAPARIN SODIUM 40 MILLIGRAM(S): 100 INJECTION SUBCUTANEOUS at 18:04

## 2022-05-25 RX ADMIN — Medication 2 PACKET(S): at 12:52

## 2022-05-25 RX ADMIN — INSULIN GLARGINE 10 UNIT(S): 100 INJECTION, SOLUTION SUBCUTANEOUS at 22:27

## 2022-05-25 RX ADMIN — Medication 40 MILLIEQUIVALENT(S): at 12:52

## 2022-05-25 RX ADMIN — Medication 2: at 06:22

## 2022-05-25 NOTE — DISCHARGE NOTE PROVIDER - NSDCFUADDAPPT_GEN_ALL_CORE_FT
Geriatric psychiatry  [phone]  [address]  [place] [address]

## 2022-05-25 NOTE — PROGRESS NOTE ADULT - ATTENDING COMMENTS
Patient is an 86 y.o F w/ PMH T2DM who presents w/ abdominal pain, found to have severe sepsis int he setting of terminal ileitis and possible intramural abscess as seen on CTA.     #SEvere sepsis  -Admitted with severe sepsis (Tmax 101.2, leukocyotis, tachycardic to 116-123, 15.3% bands, lactate to 3.7); s/p total 1.5 L   -CT angio revealed distal terminal ileitis and concern for small intramural abscess   -CXR clear, UA negative, pending BCX  -pt has not yet been able to provide a stool sample; f/u stool culture   -c/w zosyn    #Terminal ileitis  -CT angio revealed distal terminal ileitis and c/f small intramural abscess  -surgery consulted; no surgical intervention warranted as abcess too small for percutaneous drainage   -GI consulted; f/u infectious work up. May consider colonoscopy if infectious work up negative  -c/w low residual diet  -abdominal pain likely related to terminal ileitis, however, if RUQ worsens, will consider RUQ US  -see plan as above    #T2DM  -Hx of T2DM, home medicaitons include metformin  -lss, monitor FS before meals and at bedtime  -carb consistent diet   -A1c 9.3%, will likely need to start insulin therapy and f/u outpatient endocrine     Dispo: Pending clinical improvement  order PT.

## 2022-05-25 NOTE — PROGRESS NOTE ADULT - PROBLEM SELECTOR PLAN 3
Endorses b/l shoulder and upper back pain   -CTA negative for aortic dissection   -differential includes MSK vs. polymyalgia rheumatica  -f/u ESR AM-although may  be high in the setting of infection   -tylenol PRN for symptom control Endorses b/l shoulder and upper back pain that she has had for "long time"   -no temporality to it, worsens w/ movement, non tender to palpation   -CTA negative for aortic dissection   -differential includes MSK   -ESR elevated, but may be in the setting of infection  -tylenol PRN for symptom control

## 2022-05-25 NOTE — DISCHARGE NOTE PROVIDER - DETAILS OF MALNUTRITION DIAGNOSIS/DIAGNOSES
This patient has been assessed with a concern for Malnutrition and was treated during this hospitalization for the following Nutrition diagnosis/diagnoses:     -  06/01/2022: Moderate protein-calorie malnutrition

## 2022-05-25 NOTE — PROGRESS NOTE ADULT - ASSESSMENT
Patient is an 86 y.o F w/ PMH T2DM who presents w/ abdominal and shoulder pain, found to have severe sepsis int he setting of terminal ileitis and possible intramural abscess as seen on CTA. Pending infectious disease workup and possible scope by GI later in the week

## 2022-05-25 NOTE — PROGRESS NOTE ADULT - PROBLEM SELECTOR PLAN 5
DVT prophylaxis: lovenox daily   Diet: fiber diet   Dispo: pending PT   HTN: bps elevated to sbp 140s, likely undiagnosed HTN. Will continue to monitor for now at this time

## 2022-05-25 NOTE — DISCHARGE NOTE PROVIDER - CARE PROVIDERS DIRECT ADDRESSES
[email or web address]

## 2022-05-25 NOTE — DISCHARGE NOTE PROVIDER - NSDCMRMEDTOKEN_GEN_ALL_CORE_FT
metFORMIN:  orally   Reglan 10 mg oral tablet: 1 tab(s) orally 3 times a day, As Needed MDD:3 tabs   Amaryl 4 mg oral tablet: 1 tab(s) orally 2 times a day  Prandin 1 mg oral tablet: 1 tab(s) orally 3 times a day (with meals)  rolling walker: One rolling walker   metFORMIN 500 mg oral tablet: 1 tab(s) orally once a day   mirtazapine 7.5 mg oral tablet: 1 tab(s) orally once a day (at bedtime)  rolling walker: One rolling walker

## 2022-05-25 NOTE — PROGRESS NOTE ADULT - PROBLEM SELECTOR PLAN 1
Admitted with severe sepsis (Tmax 101.2, leukocytosis, tachycardic to 116-123, 15.3% bands, lactate to 3.7); s/p total 1.5 L   -CT angio revealed distal terminal ileitis and concern for small intramural abscess   -CXR clear, UA negative, pending BCX  -f/u GI PCR and C. diff if stool sample allows  -c/w zosyn  -BCx positive for gram positive cocci, will repeat BCx in AM Admitted with severe sepsis (Tmax 101.2, leukocytosis, tachycardic to 116-123, 15.3% bands, lactate to 3.7); s/p total 1.5 L   -CT angio revealed distal terminal ileitis and concern for small intramural abscess   -CXR clear, UA negative, pending BCX  -f/u GI PCR and C. diff if stool sample allows  -c/w zosyn  -BCx positive for gram positive cocci, will f/u repeats

## 2022-05-25 NOTE — PROGRESS NOTE ADULT - SUBJECTIVE AND OBJECTIVE BOX
PROGRESS NOTE:     CONTACT INFO:  Du Landin MD | PGY-1  Pager: [phone] [place], [address] [facility]  Also on TEAMS  After 7pm page night float  On weekends after 1pm check resident assignment tab to see who is covering      Patient is a 86y old  Female who presents with a chief complaint of Terminal Ileitis (25 May 2022 01:17)      SUBJECTIVE / OVERNIGHT EVENTS:    - No acute events overnight.   - No fevers/chills.  - Patient seen and evaluated at bedside.  - Denies SOB at rest, chest pain, palpitations, abdominal pain, nausea/vomiting    ADDITIONAL REVIEW OF SYSTEMS:    MEDICATIONS  (STANDING):  dextrose 5%. 1000 milliLiter(s) (50 mL/Hr) IV Continuous <Continuous>  dextrose 5%. 1000 milliLiter(s) (100 mL/Hr) IV Continuous <Continuous>  dextrose 50% Injectable 25 Gram(s) IV Push once  dextrose 50% Injectable 12.5 Gram(s) IV Push once  dextrose 50% Injectable 25 Gram(s) IV Push once  enoxaparin Injectable 40 milliGRAM(s) SubCutaneous every 24 hours  glucagon  Injectable 1 milliGRAM(s) IntraMuscular once  insulin lispro (ADMELOG) corrective regimen sliding scale   SubCutaneous every 6 hours  piperacillin/tazobactam IVPB.. 3.375 Gram(s) IV Intermittent every 8 hours    MEDICATIONS  (PRN):  acetaminophen     Tablet .. 650 milliGRAM(s) Oral every 6 hours PRN Temp greater or equal to 38C (100.4F), Mild Pain (1 - 3), Moderate Pain (4 - 6)  aluminum hydroxide/magnesium hydroxide/simethicone Suspension 30 milliLiter(s) Oral every 4 hours PRN Dyspepsia  dextrose Oral Gel 15 Gram(s) Oral once PRN Blood Glucose LESS THAN 70 milliGRAM(s)/deciliter  melatonin 3 milliGRAM(s) Oral at bedtime PRN Insomnia      CAPILLARY BLOOD GLUCOSE      POCT Blood Glucose.: 209 mg/dL (25 May 2022 06:11)  POCT Blood Glucose.: 231 mg/dL (24 May 2022 23:55)  POCT Blood Glucose.: 235 mg/dL (24 May 2022 18:05)  POCT Blood Glucose.: 226 mg/dL (24 May 2022 12:15)  POCT Blood Glucose.: 280 mg/dL (24 May 2022 08:54)    I&O's Summary    24 May 2022 07:01  -  25 May 2022 07:00  --------------------------------------------------------  IN: 200 mL / OUT: 0 mL / NET: 200 mL        PHYSICAL EXAM:  Vital Signs Last 24 Hrs  T(C): 37.1 (25 May 2022 04:33), Max: 37.6 (24 May 2022 21:48)  T(F): 98.7 (25 May 2022 04:33), Max: 99.7 (24 May 2022 21:48)  HR: 75 (25 May 2022 05:02) (73 - 89)  BP: 135/60 (25 May 2022 05:02) (101/63 - 141/61)  BP(mean): --  RR: 18 (25 May 2022 04:33) (18 - 18)  SpO2: 98% (25 May 2022 04:33) (93% - 98%)    CONSTITUTIONAL: NAD, well-developed  RESPIRATORY: Normal respiratory effort; lungs are clear to auscultation bilaterally  CARDIOVASCULAR: Regular rate and rhythm, normal S1 and S2, no murmur/rub/gallop; No lower extremity edema; Peripheral pulses are 2+ bilaterally  ABDOMEN: Nontender to palpation, normoactive bowel sounds, no rebound/guarding; No hepatosplenomegaly  MUSCULOSKELETAL: no clubbing or cyanosis of digits; no joint swelling or tenderness to palpation  PSYCH: A+O to person, place, and time; affect appropriate    LABS:                        13.5   11.93 )-----------( 230      ( 24 May 2022 11:20 )             41.6     05-24    137  |  100  |  18  ----------------------------<  261<H>  4.7   |  25  |  0.87    Ca    9.4      24 May 2022 11:20  Phos  2.7     05-24  Mg     1.7     05-24    TPro  6.4  /  Alb  3.2<L>  /  TBili  0.8  /  DBili  x   /  AST  16  /  ALT  10  /  AlkPhos  69  05-24    PT/INR - ( 23 May 2022 21:33 )   PT: 16.3 sec;   INR: 1.40 ratio         PTT - ( 23 May 2022 21:33 )  PTT:24.8 sec      Urinalysis Basic - ( 23 May 2022 22:47 )    Color: Yellow / Appearance: Clear / S.053 / pH: x  Gluc: x / Ketone: Small  / Bili: Negative / Urobili: Negative   Blood: x / Protein: 30 mg/dL / Nitrite: Negative   Leuk Esterase: Negative / RBC: 2 /hpf / WBC 1 /HPF   Sq Epi: x / Non Sq Epi: 3 /hpf / Bacteria: Negative        Culture - Urine (collected 23 May 2022 22:47)  Source: Clean Catch Clean Catch (Midstream)  Final Report (24 May 2022 21:16):    No growth    Culture - Blood (collected 23 May 2022 18:50)  Source: .Blood Blood-Peripheral  Gram Stain (24 May 2022 19:21):    Growth in aerobic bottle: Gram Positive Cocci in Clusters  Preliminary Report (24 May 2022 19:21):    Growth in aerobic bottle: Gram Positive Cocci in Clusters    ***Blood Panel PCR results on this specimen are available    approximately 3 hours after the Gram stain result.***    Gram stain, PCR, and/or culture results may not always    correspond due to difference in methodologies.    ************************************************************    This PCR assay was performed by multiplex PCR. This    Assay tests for 66 bacterial and resistance gene targets.    Please refer to the [facility] test directory    at [email or web address] for details.  Organism: Blood Culture PCR (24 May 2022 23:49)  Organism: Blood Culture PCR (24 May 2022 23:49)    Culture - Blood (collected 23 May 2022 18:40)  Source: .Blood Blood-Peripheral  Preliminary Report (25 May 2022 02:01):    No growth to date.        RADIOLOGY & ADDITIONAL TESTS:  Results Reviewed:   Imaging Personally Reviewed:  Electrocardiogram Personally Reviewed:    COORDINATION OF CARE:  Care Discussed with Consultants/Other Providers [Y/N]: Y  Prior or Outpatient Records Reviewed [Y/N]: Y   PROGRESS NOTE:     CONTACT INFO:  Du Landin MD | PGY-1  Pager: [phone] [place], [address] [facility]  Also on TEAMS  After 7pm page night float  On weekends after 1pm check resident assignment tab to see who is covering      Patient is a 86y old  Female who presents with a chief complaint of Terminal Ileitis (25 May 2022 01:17)      SUBJECTIVE / OVERNIGHT EVENTS:    - No acute events overnight.   - Continues to endorse mild diffuse abdominal pain   - No fevers/chills.  - Patient seen and evaluated at bedside.  - Denies SOB at rest, chest pain, palpitations, nausea/vomiting    ADDITIONAL REVIEW OF SYSTEMS:    MEDICATIONS  (STANDING):  dextrose 5%. 1000 milliLiter(s) (50 mL/Hr) IV Continuous <Continuous>  dextrose 5%. 1000 milliLiter(s) (100 mL/Hr) IV Continuous <Continuous>  dextrose 50% Injectable 25 Gram(s) IV Push once  dextrose 50% Injectable 12.5 Gram(s) IV Push once  dextrose 50% Injectable 25 Gram(s) IV Push once  enoxaparin Injectable 40 milliGRAM(s) SubCutaneous every 24 hours  glucagon  Injectable 1 milliGRAM(s) IntraMuscular once  insulin lispro (ADMELOG) corrective regimen sliding scale   SubCutaneous every 6 hours  piperacillin/tazobactam IVPB.. 3.375 Gram(s) IV Intermittent every 8 hours    MEDICATIONS  (PRN):  acetaminophen     Tablet .. 650 milliGRAM(s) Oral every 6 hours PRN Temp greater or equal to 38C (100.4F), Mild Pain (1 - 3), Moderate Pain (4 - 6)  aluminum hydroxide/magnesium hydroxide/simethicone Suspension 30 milliLiter(s) Oral every 4 hours PRN Dyspepsia  dextrose Oral Gel 15 Gram(s) Oral once PRN Blood Glucose LESS THAN 70 milliGRAM(s)/deciliter  melatonin 3 milliGRAM(s) Oral at bedtime PRN Insomnia      CAPILLARY BLOOD GLUCOSE      POCT Blood Glucose.: 209 mg/dL (25 May 2022 06:11)  POCT Blood Glucose.: 231 mg/dL (24 May 2022 23:55)  POCT Blood Glucose.: 235 mg/dL (24 May 2022 18:05)  POCT Blood Glucose.: 226 mg/dL (24 May 2022 12:15)  POCT Blood Glucose.: 280 mg/dL (24 May 2022 08:54)    I&O's Summary    24 May 2022 07:01  -  25 May 2022 07:00  --------------------------------------------------------  IN: 200 mL / OUT: 0 mL / NET: 200 mL        PHYSICAL EXAM:  Vital Signs Last 24 Hrs  T(C): 37.1 (25 May 2022 04:33), Max: 37.6 (24 May 2022 21:48)  T(F): 98.7 (25 May 2022 04:33), Max: 99.7 (24 May 2022 21:48)  HR: 75 (25 May 2022 05:02) (73 - 89)  BP: 135/60 (25 May 2022 05:02) (101/63 - 141/61)  BP(mean): --  RR: 18 (25 May 2022 04:33) (18 - 18)  SpO2: 98% (25 May 2022 04:33) (93% - 98%)    CONSTITUTIONAL: NAD, well-developed  RESPIRATORY: Normal respiratory effort; lungs are clear to auscultation bilaterally  CARDIOVASCULAR: Regular rate and rhythm, normal S1 and S2, no murmur/rub/gallop; No lower extremity edema; Peripheral pulses are 2+ bilaterally  ABDOMEN:  Diffuse tenderness to light and deep palpation. No palpable masses or tenderness, +BS throughout,  MUSCULOSKELETAL: Gait deferred, no clubbing or cyanosis of digits; no joint swelling or tenderness to palpation  PSYCH: A+O to person, place, and time; affect appropriate    LABS:                        13.5   11.93 )-----------( 230      ( 24 May 2022 11:20 )             41.6     05-24    137  |  100  |  18  ----------------------------<  261<H>  4.7   |  25  |  0.87    Ca    9.4      24 May 2022 11:20  Phos  2.7     05-24  Mg     1.7     05-24    TPro  6.4  /  Alb  3.2<L>  /  TBili  0.8  /  DBili  x   /  AST  16  /  ALT  10  /  AlkPhos  69  05-24    PT/INR - ( 23 May 2022 21:33 )   PT: 16.3 sec;   INR: 1.40 ratio         PTT - ( 23 May 2022 21:33 )  PTT:24.8 sec      Urinalysis Basic - ( 23 May 2022 22:47 )    Color: Yellow / Appearance: Clear / S.053 / pH: x  Gluc: x / Ketone: Small  / Bili: Negative / Urobili: Negative   Blood: x / Protein: 30 mg/dL / Nitrite: Negative   Leuk Esterase: Negative / RBC: 2 /hpf / WBC 1 /HPF   Sq Epi: x / Non Sq Epi: 3 /hpf / Bacteria: Negative        Culture - Urine (collected 23 May 2022 22:47)  Source: Clean Catch Clean Catch (Midstream)  Final Report (24 May 2022 21:16):    No growth    Culture - Blood (collected 23 May 2022 18:50)  Source: .Blood Blood-Peripheral  Gram Stain (24 May 2022 19:21):    Growth in aerobic bottle: Gram Positive Cocci in Clusters  Preliminary Report (24 May 2022 19:21):    Growth in aerobic bottle: Gram Positive Cocci in Clusters    ***Blood Panel PCR results on this specimen are available    approximately 3 hours after the Gram stain result.***    Gram stain, PCR, and/or culture results may not always    correspond due to difference in methodologies.    ************************************************************    This PCR assay was performed by multiplex PCR. This    Assay tests for 66 bacterial and resistance gene targets.    Please refer to the [facility] test directory    at [email or web address] for details.  Organism: Blood Culture PCR (24 May 2022 23:49)  Organism: Blood Culture PCR (24 May 2022 23:49)    Culture - Blood (collected 23 May 2022 18:40)  Source: .Blood Blood-Peripheral  Preliminary Report (25 May 2022 02:01):    No growth to date.        RADIOLOGY & ADDITIONAL TESTS:  Results Reviewed:   Imaging Personally Reviewed:  Electrocardiogram Personally Reviewed:    COORDINATION OF CARE:  Care Discussed with Consultants/Other Providers [Y/N]: Y  Prior or Outpatient Records Reviewed [Y/N]: Y

## 2022-05-25 NOTE — PROGRESS NOTE ADULT - SUBJECTIVE AND OBJECTIVE BOX
Chief Complaint:  Patient is a 86y old  Female who presents with a chief complaint of Terminal Ileitis (25 May 2022 07:00)      Reason for consult: abd pain, TI abscess    Interval Events: Abd pain persists but improved from admission, no BMs, passing gas, no n/v, tolerating diet. BCx +.     Hospital Medications:  acetaminophen     Tablet .. 650 milliGRAM(s) Oral every 6 hours PRN  aluminum hydroxide/magnesium hydroxide/simethicone Suspension 30 milliLiter(s) Oral every 4 hours PRN  dextrose 5%. 1000 milliLiter(s) IV Continuous <Continuous>  dextrose 5%. 1000 milliLiter(s) IV Continuous <Continuous>  dextrose 50% Injectable 25 Gram(s) IV Push once  dextrose 50% Injectable 12.5 Gram(s) IV Push once  dextrose 50% Injectable 25 Gram(s) IV Push once  dextrose Oral Gel 15 Gram(s) Oral once PRN  enoxaparin Injectable 40 milliGRAM(s) SubCutaneous every 24 hours  glucagon  Injectable 1 milliGRAM(s) IntraMuscular once  insulin lispro (ADMELOG) corrective regimen sliding scale   SubCutaneous three times a day before meals  insulin lispro (ADMELOG) corrective regimen sliding scale   SubCutaneous at bedtime  magnesium oxide 400 milliGRAM(s) Oral once  melatonin 3 milliGRAM(s) Oral at bedtime PRN  piperacillin/tazobactam IVPB.. 3.375 Gram(s) IV Intermittent every 8 hours  potassium chloride    Tablet ER 40 milliEquivalent(s) Oral once  potassium phosphate / sodium phosphate Powder (PHOS-NaK) 2 Packet(s) Oral once      ROS:   [x] 14 point ROS negative other than as above  [ ] Patient unable to provide    PHYSICAL EXAM:   Vital Signs:  Vital Signs Last 24 Hrs  T(C): 37.1 (25 May 2022 04:33), Max: 37.6 (24 May 2022 21:48)  T(F): 98.7 (25 May 2022 04:33), Max: 99.7 (24 May 2022 21:48)  HR: 75 (25 May 2022 05:02) (73 - 89)  BP: 135/60 (25 May 2022 05:02) (101/63 - 141/61)  BP(mean): --  RR: 18 (25 May 2022 04:33) (18 - 18)  SpO2: 98% (25 May 2022 04:33) (93% - 98%)  Daily     Daily Weight in k (25 May 2022 08:19)    GENERAL: no acute distress  NEURO: alert  HEENT: anicteric sclera  CHEST: no respiratory distress, no accessory muscle use  ABDOMEN: soft, non-tender, non-distended, no rebound or guarding  EXTREMITIES: warm, well perfused, no edema  SKIN: no jaundice    LABS: reviewed                        12.1   12.47 )-----------( 207      ( 25 May 2022 06:40 )             36.7         136  |  100  |  21  ----------------------------<  209<H>  3.8   |  23  |  0.96    Ca    9.0      25 May 2022 06:36  Phos  2.6       Mg     1.7         TPro  6.4  /  Alb  3.2<L>  /  TBili  0.8  /  DBili  x   /  AST  16  /  ALT  10  /  AlkPhos  69      LIVER FUNCTIONS - ( 24 May 2022 11:20 )  Alb: 3.2 g/dL / Pro: 6.4 g/dL / ALK PHOS: 69 U/L / ALT: 10 U/L / AST: 16 U/L / GGT: x             Interval Diagnostic Studies: see sunrise for full report   Chief Complaint:  Patient is a 86y old  Female who presents with a chief complaint of Terminal Ileitis (25 May 2022 07:00)      Reason for consult: abd pain, TI abscess    Interval Events: Abd pain persists but improved from admission, no BMs, passing gas, no n/v, tolerating diet. BCx +.     Hospital Medications:  acetaminophen     Tablet .. 650 milliGRAM(s) Oral every 6 hours PRN  aluminum hydroxide/magnesium hydroxide/simethicone Suspension 30 milliLiter(s) Oral every 4 hours PRN  dextrose 5%. 1000 milliLiter(s) IV Continuous <Continuous>  dextrose 5%. 1000 milliLiter(s) IV Continuous <Continuous>  dextrose 50% Injectable 25 Gram(s) IV Push once  dextrose 50% Injectable 12.5 Gram(s) IV Push once  dextrose 50% Injectable 25 Gram(s) IV Push once  dextrose Oral Gel 15 Gram(s) Oral once PRN  enoxaparin Injectable 40 milliGRAM(s) SubCutaneous every 24 hours  glucagon  Injectable 1 milliGRAM(s) IntraMuscular once  insulin lispro (ADMELOG) corrective regimen sliding scale   SubCutaneous three times a day before meals  insulin lispro (ADMELOG) corrective regimen sliding scale   SubCutaneous at bedtime  magnesium oxide 400 milliGRAM(s) Oral once  melatonin 3 milliGRAM(s) Oral at bedtime PRN  piperacillin/tazobactam IVPB.. 3.375 Gram(s) IV Intermittent every 8 hours  potassium chloride    Tablet ER 40 milliEquivalent(s) Oral once  potassium phosphate / sodium phosphate Powder (PHOS-NaK) 2 Packet(s) Oral once      ROS:   [x] 14 point ROS negative other than as above  [ ] Patient unable to provide    PHYSICAL EXAM:   Vital Signs:  Vital Signs Last 24 Hrs  T(C): 37.1 (25 May 2022 04:33), Max: 37.6 (24 May 2022 21:48)  T(F): 98.7 (25 May 2022 04:33), Max: 99.7 (24 May 2022 21:48)  HR: 75 (25 May 2022 05:02) (73 - 89)  BP: 135/60 (25 May 2022 05:02) (101/63 - 141/61)  BP(mean): --  RR: 18 (25 May 2022 04:33) (18 - 18)  SpO2: 98% (25 May 2022 04:33) (93% - 98%)  Daily     Daily Weight in k (25 May 2022 08:19)    GENERAL: no acute distress  NEURO: alert  HEENT: anicteric sclera  CHEST: no respiratory distress, no accessory muscle use  ABDOMEN: soft, TTP RUQ, otherwise non-tender  EXTREMITIES: warm, well perfused, no edema  SKIN: no jaundice    LABS: reviewed                        12.1   12.47 )-----------( 207      ( 25 May 2022 06:40 )             36.7         136  |  100  |  21  ----------------------------<  209<H>  3.8   |  23  |  0.96    Ca    9.0      25 May 2022 06:36  Phos  2.6       Mg     1.7         TPro  6.4  /  Alb  3.2<L>  /  TBili  0.8  /  DBili  x   /  AST  16  /  ALT  10  /  AlkPhos  69  05-24    LIVER FUNCTIONS - ( 24 May 2022 11:20 )  Alb: 3.2 g/dL / Pro: 6.4 g/dL / ALK PHOS: 69 U/L / ALT: 10 U/L / AST: 16 U/L / GGT: x             Interval Diagnostic Studies: see sunrise for full report

## 2022-05-25 NOTE — DISCHARGE NOTE PROVIDER - CARE PROVIDER_API CALL
Brayan Jc)  Gastroenterology; Internal Medicine  [address] [place] [address]  Phone: [phone]  Fax: [phone]  Follow Up Time: 2 weeks    Maria Elena Mahajan ()  Internal Medicine  [facility], [address] [place] [address]  Phone: [phone]  Fax: [phone]  Established Patient  Follow Up Time: 2 weeks

## 2022-05-25 NOTE — PHYSICAL THERAPY INITIAL EVALUATION ADULT - PERTINENT HX OF CURRENT PROBLEM, REHAB EVAL
86 y.o Fa admitted to [facility] on 5/24/22  w/ PMH T2DM who presents w/ abdominal and shoulder pain, found to have severe sepsis int he setting of terminal ileitis and possible intramural abscess as seen on CTA. Pending infectious disease workup and possible scope by GI later in the week 86 y.o Fa admitted to [facility] on 5/24/22  w/ PMH T2DM who presents w/ abdominal and shoulder pain, found to have severe sepsis int he setting of terminal ileitis and possible intramural abscess as seen on CTA. Pending infectious disease workup and possible scope by GI later in the week Ct angio/ CT abd/ pelvis distal terminal ileitis, concern for small intermural abscess

## 2022-05-25 NOTE — PROGRESS NOTE ADULT - PROBLEM SELECTOR PLAN 6
Dispo: pending clinical improvement  f/u PT Dispo: pending clinical improvement  PT eval: ambulate as tolerated

## 2022-05-25 NOTE — PHYSICAL THERAPY INITIAL EVALUATION ADULT - CRITERIA FOR SKILLED THERAPEUTIC INTERVENTIONS
home PT for bed mobs, transfers, amb training, balance training and ther exercises / rolling walker/impairments found/anticipated discharge recommendation

## 2022-05-25 NOTE — PROGRESS NOTE ADULT - ATTENDING COMMENTS
Agree with above. Pt today reports RUQ pain that was not present yesterday, still with some lower abdomina discomfort. No BM yet. Would check RUQ sonogram. If pain does not improve, plan for colonoscopy Friday to rule out etiologies such as IBD. Discussed with daughter and patient at bedside.

## 2022-05-25 NOTE — DISCHARGE NOTE PROVIDER - PROVIDER TOKENS
PROVIDER:[TOKEN:[[id number]],FOLLOWUP:[2 weeks]],PROVIDER:[TOKEN:[[id number]],FOLLOWUP:[2 weeks],ESTABLISHEDPATIENT:[T]]

## 2022-05-25 NOTE — DISCHARGE NOTE PROVIDER - NSDCCPTREATMENT_GEN_ALL_CORE_FT
PRINCIPAL PROCEDURE  Procedure: Colonoscopy  Findings and Treatment: Findings:   A localized area of abnormal mucosa was found in the cecum. Biopsies were taken with acold forceps for histology. Many diverticula were found in the recto-sigmoid colon, descending colon and transverse colon. A small polyp was found in the descending colon. The polyp was sessile. The polyp was removed with a jumbo cold forceps. Resection and retrieval were complete. The exam was otherwise normal throughout the examined colon. The terminal ileum appeared normal and was intubated 15 cm. Biopsies were taken with a cold forceps for histology.                                                                                                        Impression:           - Normal Terminal Ileum - biosied   - Possible Abnormal mucosa in the cecum. Biopsied.   - Diverticulosis in the recto-sigmoid colon, in the descending colon and in the transverse colon.   - One small polyp in the descending colon, removed with a jumbo cold forceps. Resected and retrieved.

## 2022-05-25 NOTE — PHYSICAL THERAPY INITIAL EVALUATION ADULT - ADDITIONAL COMMENTS
lives in  [facility] w/ 2 steps to enter, and flight of steps to bedroom, does not use assistive device, glasses for distance, hearing good, pt is R handed, Pt has  every other week

## 2022-05-25 NOTE — PROGRESS NOTE ADULT - ASSESSMENT
86F presenting w/ abd pain. Found to have inflammation and possible abscess in terminal ileum. No chronic GI symptoms at baseline.    Impression:  #Terminal ileitis - D/dx most likely infectious given acute onset. Less likely inflammatory or malignant.    Recommendations:  - Check GI PCR and C Diff.  - F/u repeat BCx  - IV Abx per primary team  - Low residue diet for now.  - Based on above and clinical course, will consider colonoscopy later this week if infectious work-up is negative.    GI will continue to follow.     Recommendations preliminary until signed by attending.     Nadeem Sawant  Gastroenterology/Hepatology Fellow  Available via [employer] Teams    NON-URGENT CONSULTS:  Please email [email or web address]  AT NIGHT AND ON WEEKENDS:  Contact on-call GI fellow via answering service ([phone]) from 5pm-8am and on weekends/holidays  MONDAY-FRIDAY 8AM-5PM:  Pager# [phone] ([facility]) or [phone] ([facility])  GI Phone# [phone] ([facility])     86F presenting w/ abd pain. Found to have inflammation and possible abscess in terminal ileum. No chronic GI symptoms at baseline.    Impression:  #Terminal ileitis - D/dx most likely infectious given acute onset. Less likely inflammatory or malignant.  #RUQ pain - new since yesterday.     Recommendations:  - Check GI PCR and C Diff.  - F/u repeat BCx  - IV Abx per primary team  - Low residue diet for now.  - Based on above and clinical course, will consider colonoscopy later this week if infectious work-up is negative.  - If RUQ pain persists, would consider RUQ U/S to r/o biliary pathology.     GI will continue to follow.     Recommendations preliminary until signed by attending.     Nadeem Sawant  Gastroenterology/Hepatology Fellow  Available via [employer] Teams    NON-URGENT CONSULTS:  Please email [email or web address]  AT NIGHT AND ON WEEKENDS:  Contact on-call GI fellow via answering service ([phone]) from 5pm-8am and on weekends/holidays  MONDAY-FRIDAY 8AM-5PM:  Pager# [phone] ([facility]) or [phone] ([facility])  GI Phone# [phone] ([facility])

## 2022-05-25 NOTE — DISCHARGE NOTE PROVIDER - NSDCCPCAREPLAN_GEN_ALL_CORE_FT
PRINCIPAL DISCHARGE DIAGNOSIS  Diagnosis: Severe sepsis  Assessment and Plan of Treatment:       SECONDARY DISCHARGE DIAGNOSES  Diagnosis: Type 2 diabetes mellitus  Assessment and Plan of Treatment:     Diagnosis: Terminal ileitis  Assessment and Plan of Treatment:      PRINCIPAL DISCHARGE DIAGNOSIS  Diagnosis: Terminal ileitis  Assessment and Plan of Treatment: You came to the hospital complaining of diffuse abdominal pain. Imaging demonstrated that you had inflammation in your small intestine. You were started on an antibiotic, zosyn for your inflammation. Blood cultures drawn were normal. Our surgery department evaluated you and determined that there was no need for surgery. Our gastroenterology department proposed a colonoscopy in order to observe the inner lining of your intestine. Colonoscopy was benign. You were switched onto another antibiotic, cefepime and flagyl for which, under the advice of our infection disease physicians, you complete the day of discharge.   PLEASE FOLLOW UP WITH YOUR PRIMARY CARE PHYSICIAN AND GASTROENTEROLOGIST FOR BIOPSY RESULTS AND FURTHER MANAGEMENT OF YOUR CONDITION.      SECONDARY DISCHARGE DIAGNOSES  Diagnosis: Type 2 diabetes mellitus  Assessment and Plan of Treatment: You came to the hospital with a history of diabetes for which you were taking amaryl and prandin. You were placed on insulin during your stay at the hospital. You can go back to your home medications upon discharge. We have also added metformin for further management.  Please follow up with your primary care physician for further management of your diabetes.    Diagnosis: Anxious depression  Assessment and Plan of Treatment: You developed signs of anxiety and depression during your hospital stay, at times wanting "to end it" and overdose on medications on order to do so. You were evaluated by our psychiatry department which started you on wellbutryn and remeron. Given the seizure risk of wellbutryn, it was discontinued and you were continued on remeron. You will be discharged on remerson.   PLEASE FOLLOW UP WITH GERIATRIC PSYCHIATRY UPON DISCHARGE.

## 2022-05-25 NOTE — DISCHARGE NOTE PROVIDER - HOSPITAL COURSE
86W with h/o DM presented with one day of abdominal pain. This pain was exacerbated when she reclined on her Lazy-Boy chair. She did not take any pain relieving medications. She denies fevers or chills during this time. She also endorsed a shoulder pain that she endorses has been chronic. Denied fever, chills, n/v/d. Had a previous colonoscopy but unclear as to result.     ED course: Patient with elevated T at 101F and tachycardia at 120. She received 1L bolus with improvement in HR. Received Vanco x1 and zosyn x1. CTAP demonstrated findings of distal/terminal ileitis with concern for small intramural abscess. Lactate downtrending. Surgery stated no acute surgical intervention indicated given that abscess too small for drainage. Recommended GI consult and continued antibiotics.     86W with h/o DM presented with one day of abdominal pain. This pain was exacerbated when she reclined on her Lazy-Boy chair. She did not take any pain relieving medications. She denied fevers or chills during this time. She also endorsed a shoulder pain that she endorsed had been chronic. Denied fever, chills, n/v/d. Had a previous colonoscopy but unclear as to result.     ED course: Patient with elevated T at 101F and tachycardia at 120. She received 1L bolus with improvement in HR. Received Vanco x1 and zosyn x1. CTAP demonstrated findings of distal/terminal ileitis with concern for small intramural abscess. Lactate downtrending. Surgery stated no acute surgical intervention indicated given that abscess too small for drainage. Recommended GI consult and continued antibiotics.      Hospital course: The patient was started on zosyn to treat for inflammation of her intestine. She was evaluated by general surgery which stated that no surgery was indicated. She was also evaluated by gastroenterology for which colonoscopy was offered. Colonoscopy demonstrated no new masses or friability. Several biopsies were acquired for which the patient will follow up on in an outpatient visit. The patient's antibiotic was converted to cefepime and flagyl for which the patient finished her course prior to being discharged. During her hospital course, the patient developed signs of depression. Psychiatry recommended remeron for which the patient will be discharged on with follow up information provided for geriatric psychiatry.     The patient is hemodynamically optimized for discharge.

## 2022-05-25 NOTE — PROGRESS NOTE ADULT - PROBLEM SELECTOR PLAN 4
PMH T2DM, home medications include metformin  -To acquire formal med rec   -c/w lss  -FS before meals and at bedtime   -f/u A1c  -carb consistent low residual diet  -to convert to liquids if GI determines patient indicated for scope Per Pharmacy, patient takes prandin and amaryl at home   - hold prandin and amaryl during inpatient   - A1C 9.3  - Start patient on lantus 10 and mealtime 2U  - c/w lss  - FS before meals and at bedtime   - carb consistent low residual diet  - to convert to liquids if GI determines patient indicated for scope

## 2022-05-26 DIAGNOSIS — Q43.0 MECKEL'S DIVERTICULUM (DISPLACED) (HYPERTROPHIC): ICD-10-CM

## 2022-05-26 LAB
CULTURE RESULTS: SIGNIFICANT CHANGE UP
GLUCOSE BLDC GLUCOMTR-MCNC: 203 MG/DL — HIGH (ref 70–99)
GLUCOSE BLDC GLUCOMTR-MCNC: 208 MG/DL — HIGH (ref 70–99)
GLUCOSE BLDC GLUCOMTR-MCNC: 300 MG/DL — HIGH (ref 70–99)
GLUCOSE BLDC GLUCOMTR-MCNC: 324 MG/DL — HIGH (ref 70–99)
SPECIMEN SOURCE: SIGNIFICANT CHANGE UP

## 2022-05-26 PROCEDURE — 76705 ECHO EXAM OF ABDOMEN: CPT | Mod: 26

## 2022-05-26 PROCEDURE — 99232 SBSQ HOSP IP/OBS MODERATE 35: CPT | Mod: GC

## 2022-05-26 PROCEDURE — 99232 SBSQ HOSP IP/OBS MODERATE 35: CPT

## 2022-05-26 RX ORDER — FAMOTIDINE 10 MG/ML
20 INJECTION INTRAVENOUS ONCE
Refills: 0 | Status: DISCONTINUED | OUTPATIENT
Start: 2022-05-26 | End: 2022-05-26

## 2022-05-26 RX ORDER — SOD SULF/SODIUM/NAHCO3/KCL/PEG
4000 SOLUTION, RECONSTITUTED, ORAL ORAL ONCE
Refills: 0 | Status: DISCONTINUED | OUTPATIENT
Start: 2022-05-26 | End: 2022-05-26

## 2022-05-26 RX ORDER — INSULIN GLARGINE 100 [IU]/ML
5 INJECTION, SOLUTION SUBCUTANEOUS AT BEDTIME
Refills: 0 | Status: DISCONTINUED | OUTPATIENT
Start: 2022-05-26 | End: 2022-05-28

## 2022-05-26 RX ORDER — FAMOTIDINE 10 MG/ML
50 INJECTION INTRAVENOUS ONCE
Refills: 0 | Status: COMPLETED | OUTPATIENT
Start: 2022-05-27 | End: 2022-05-27

## 2022-05-26 RX ORDER — SODIUM CHLORIDE 9 MG/ML
500 INJECTION, SOLUTION INTRAVENOUS ONCE
Refills: 0 | Status: COMPLETED | OUTPATIENT
Start: 2022-05-26 | End: 2022-05-26

## 2022-05-26 RX ADMIN — Medication 2: at 09:05

## 2022-05-26 RX ADMIN — SODIUM CHLORIDE 1000 MILLILITER(S): 9 INJECTION, SOLUTION INTRAVENOUS at 21:42

## 2022-05-26 RX ADMIN — Medication 1: at 21:43

## 2022-05-26 RX ADMIN — Medication 2 UNIT(S): at 18:53

## 2022-05-26 RX ADMIN — INSULIN GLARGINE 5 UNIT(S): 100 INJECTION, SOLUTION SUBCUTANEOUS at 21:43

## 2022-05-26 RX ADMIN — Medication 2 UNIT(S): at 09:06

## 2022-05-26 RX ADMIN — PIPERACILLIN AND TAZOBACTAM 25 GRAM(S): 4; .5 INJECTION, POWDER, LYOPHILIZED, FOR SOLUTION INTRAVENOUS at 04:46

## 2022-05-26 RX ADMIN — PIPERACILLIN AND TAZOBACTAM 25 GRAM(S): 4; .5 INJECTION, POWDER, LYOPHILIZED, FOR SOLUTION INTRAVENOUS at 20:00

## 2022-05-26 RX ADMIN — ENOXAPARIN SODIUM 40 MILLIGRAM(S): 100 INJECTION SUBCUTANEOUS at 18:00

## 2022-05-26 RX ADMIN — Medication 4: at 18:53

## 2022-05-26 NOTE — PROGRESS NOTE ADULT - ASSESSMENT
86F presenting w/ abd pain. Found to have inflammation and possible abscess in terminal ileum. No chronic GI symptoms at baseline.    Impression:  #Terminal ileitis - D/dx most likely infectious given acute onset. Less likely inflammatory or malignant.  #RUQ pain - new since yesterday.     Recommendations:  - Check GI PCR and C Diff.  - IV Abx per primary team.  - Low residue diet for now.  - Based on above and clinical course, will consider colonoscopy later this week if infectious work-up is negative.  - Check RUQ U/S    GI will continue to follow.     Recommendations preliminary until signed by attending.     Nadeem Sawant  Gastroenterology/Hepatology Fellow  Available via [employer] Teams    NON-URGENT CONSULTS:  Please email [email or web address]  AT NIGHT AND ON WEEKENDS:  Contact on-call GI fellow via answering service ([phone]) from 5pm-8am and on weekends/holidays  MONDAY-FRIDAY 8AM-5PM:  Pager# [phone] ([facility]) or [phone] ([facility])  GI Phone# [phone] ([facility])     86F presenting w/ abd pain. Found to have inflammation and possible abscess in terminal ileum. No chronic GI symptoms at baseline.    Impression:  #Terminal ileitis - D/dx most likely infectious given acute onset. Less likely inflammatory or malignant.  #RUQ pain - new since yesterday.     Recommendations:  - Plan for colonoscopy tomorrow given ongoing pain and no bowel movements so unable to send stool testing.  - Check GI PCR if having BMs  - IV Abx per primary team.  - Clear liquid diet today, NPO @ MN.  - Prep ordered by GI  - Check RUQ U/S    GI will continue to follow.     Recommendations preliminary until signed by attending.     Nadeem aSwant  Gastroenterology/Hepatology Fellow  Available via [employer] Teams    NON-URGENT CONSULTS:  Please email [email or web address]  [email or web address]  AT NIGHT AND ON WEEKENDS:  Contact on-call GI fellow via answering service ([phone]) from 5pm-8am and on weekends/holidays  MONDAY-FRIDAY 8AM-5PM:  Pager# [phone] ([facility]) or [phone] ([facility])  GI Phone# [phone] ([facility])     86F presenting w/ abd pain. Found to have inflammation and possible abscess in terminal ileum. No chronic GI symptoms at baseline.    Impression:  #Terminal ileitis - D/dx most likely infectious given acute onset. Less likely inflammatory or malignant.  #RUQ pain - new since yesterday.     Recommendations:  - Discussed with surgery - possible Meckel's on review of imaging, will hold off on colonoscopy for now  - F/u Meckel's scan tomorrow  - Check GI PCR if having BMs  - IV Abx per primary team.  - Check RUQ U/S    GI will continue to follow.     Recommendations preliminary until signed by attending.     Nadeem Sawant  Gastroenterology/Hepatology Fellow  Available via [employer] Teams    NON-URGENT CONSULTS:  Please email [email or web address]  [email or web address]  AT NIGHT AND ON WEEKENDS:  Contact on-call GI fellow via answering service ([phone]) from 5pm-8am and on weekends/holidays  MONDAY-FRIDAY 8AM-5PM:  Pager# [phone] ([facility]) or [phone] ([facility])  GI Phone# [phone] ([facility])

## 2022-05-26 NOTE — PROGRESS NOTE ADULT - PROBLEM SELECTOR PLAN 3
Endorses b/l shoulder and upper back pain that she has had for "long time"   -no temporality to it, worsens w/ movement, non tender to palpation   -CTA negative for aortic dissection   -differential includes MSK   -ESR elevated, but may be in the setting of infection  -tylenol PRN for symptom control

## 2022-05-26 NOTE — PROGRESS NOTE ADULT - ATTENDING COMMENTS
Agree with above. Discussed with Dr. Bañuelos - possible Meckel's diverticulum on review of admission CT - planned for Meckels' scan tomorrow. Will hold off on endoscopic evaluation pending review of imaging. Discussed with Dr. Iqbal. If patient has BM, please send for stool infectious workup as ordered.

## 2022-05-26 NOTE — PROGRESS NOTE ADULT - SUBJECTIVE AND OBJECTIVE BOX
Chief Complaint:  Patient is a 86y old  Female who presents with a chief complaint of Terminal Ileitis (26 May 2022 07:55)    Reason for consult: terminal ileum inflammation    Interval Events: Pain persists in RUQ and lower abd, tolerating PO, had liquid BM this AM, pending stool studies and Q U/S    Hospital Medications:  acetaminophen     Tablet .. 650 milliGRAM(s) Oral every 6 hours PRN  aluminum hydroxide/magnesium hydroxide/simethicone Suspension 30 milliLiter(s) Oral every 4 hours PRN  dextrose 5%. 1000 milliLiter(s) IV Continuous <Continuous>  dextrose 5%. 1000 milliLiter(s) IV Continuous <Continuous>  dextrose 50% Injectable 25 Gram(s) IV Push once  dextrose 50% Injectable 12.5 Gram(s) IV Push once  dextrose 50% Injectable 25 Gram(s) IV Push once  dextrose Oral Gel 15 Gram(s) Oral once PRN  enoxaparin Injectable 40 milliGRAM(s) SubCutaneous every 24 hours  glucagon  Injectable 1 milliGRAM(s) IntraMuscular once  insulin glargine Injectable (LANTUS) 10 Unit(s) SubCutaneous at bedtime  insulin lispro (ADMELOG) corrective regimen sliding scale   SubCutaneous three times a day before meals  insulin lispro (ADMELOG) corrective regimen sliding scale   SubCutaneous at bedtime  insulin lispro Injectable (ADMELOG) 2 Unit(s) SubCutaneous three times a day before meals  melatonin 3 milliGRAM(s) Oral at bedtime PRN  piperacillin/tazobactam IVPB.. 3.375 Gram(s) IV Intermittent every 8 hours      ROS:   [x] 14 point ROS negative other than as above  [ ] Patient unable to provide    PHYSICAL EXAM:   Vital Signs:  Vital Signs Last 24 Hrs  T(C): 36.4 (26 May 2022 04:48), Max: 37.1 (25 May 2022 20:35)  T(F): 97.6 (26 May 2022 04:48), Max: 98.7 (25 May 2022 20:35)  HR: 78 (26 May 2022 04:48) (75 - 108)  BP: 145/60 (26 May 2022 04:48) (127/51 - 154/80)  BP(mean): --  RR: 18 (26 May 2022 04:48) (18 - 18)  SpO2: 96% (26 May 2022 04:48) (95% - 98%)  Daily     Daily     GENERAL: no acute distress  NEURO: alert  HEENT: anicteric sclera  CHEST: no respiratory distress, no accessory muscle use  ABDOMEN: soft, TTP RUQ and lower abd   EXTREMITIES: warm, well perfused, no edema  SKIN: no jaundice    LABS: reviewed                        12.1   12.47 )-----------( 207      ( 25 May 2022 06:40 )             36.7     05-25    136  |  100  |  21  ----------------------------<  209<H>  3.8   |  23  |  0.96    Ca    9.0      25 May 2022 06:36  Phos  2.6     05-25  Mg     1.7     05-25    TPro  6.4  /  Alb  3.2<L>  /  TBili  0.8  /  DBili  x   /  AST  16  /  ALT  10  /  AlkPhos  69  05-24    LIVER FUNCTIONS - ( 24 May 2022 11:20 )  Alb: 3.2 g/dL / Pro: 6.4 g/dL / ALK PHOS: 69 U/L / ALT: 10 U/L / AST: 16 U/L / GGT: x             Interval Diagnostic Studies: see sunrise for full report

## 2022-05-26 NOTE — PROGRESS NOTE ADULT - ATTENDING COMMENTS
Patient is an 86 y.o F w/ PMH T2DM who presents w/ abdominal pain, found to have severe sepsis int he setting of terminal ileitis and possible intramural abscess as seen on CTA.     #Terminal ileitis  -Initially admitted w/ severe sepsis likely 2/2 terminal ileitis as seen on imaging; UA and BCX NGTD  -CT angio revealed distal terminal ileitis and c/f small intramural abscess  -surgery consulted; no surgical intervention warranted as abcess too small for percutaneous drainage   -c/w zosyn  -GI consulted; f/u infectious work up, altho patient unable to provide stool sample. May consider colonoscopy   -Per surgery, concern for meckel's   -c/w low residual diet      #RUQ pain   -Patient continues to endorse RUQ pain, unclear etiology   -RUQ U/S revealed gall bladder sludge, some edema in gall bladder well in the setting of ascites   -Per surgery, after review of imaging concern for meckel's diverticulum-pending meckel's scan tomorrow  -NPO after midnight and famotidine tomorrow AM      #T2DM  -Hx of T2DM, home medicaitons include prandin and amaryl, had been on metformin but unclear why discontinued   -lss, monitor FS before meals and at bedtime  -carb consistent diet   -A1c 9.3%, will likely need to start insulin therapy and f/u outpatient endocrine     Dispo: Pending clinical improvement and meckel's scan   order PT.

## 2022-05-26 NOTE — PROGRESS NOTE ADULT - ASSESSMENT
86F w/ PMH T2DM who presents w/ abdominal and shoulder pain, found to have severe sepsis int he setting of terminal ileitis and possible intramural abscess as seen on CTA. Pending infectious disease workup and possible scope by GI later in the week.

## 2022-05-26 NOTE — PROGRESS NOTE ADULT - PROBLEM SELECTOR PLAN 7
R/o meckel's diverticulum per surgery  - meckels scan scheduled 8Am 5/27  - NPO after MN  - Famotidine 50 x 1 ordered per surgery  - Salgado per surgery, will d/c when not necessary per surg

## 2022-05-26 NOTE — PROGRESS NOTE ADULT - PROBLEM SELECTOR PLAN 4
Per Pharmacy, patient takes prandin and amaryl at home   - hold prandin and amaryl during inpatient   - A1C 9.3  - Start patient on lantus 10 and mealtime 2U; titrate to 100-180 FS  - c/w DIGNA/FS qac, qhs  - carb consistent low residual diet  - to convert to liquids if GI determines patient indicated for scope

## 2022-05-26 NOTE — CHART NOTE - NSCHARTNOTEFT_GEN_A_CORE
Pt seen and examined by Dr. Bañuelos   CT scan reviewed with radiology ? Meckel's diverticulum   will order Meckel's scan     Green Sx 8068 Pt seen and examined by Dr. Bañuelos   CT scan reviewed with radiology ? Meckel's diverticulum   ordered Meckel's scan to be done 5/27   must be NPO past midnight 5/26  jackson catheter placed 5/27 am   famotidine 50mg x1 5/27 am     Jd Sx 6107 Pt seen and examined by Dr. Bañuelos   CT scan reviewed with radiology ? Meckel's diverticulum   ordered Meckel's scan to be done 5/27   must be NPO past midnight 5/26  jackson catheter placed 5/27 am   famotidine 50mg x1 5/27 am     Green Sx [id number]    I have seen and examined the patient. I agree with the above surgery PA's note.

## 2022-05-26 NOTE — PROGRESS NOTE ADULT - SUBJECTIVE AND OBJECTIVE BOX
Alexandro Wren MD, PGY-3  Available on [employer] Teams  ---------------------------------------------------------------------------------------------  Patient is a 86y old  Female who presents with a chief complaint of Terminal Ileitis (25 May 2022 09:54)      SUBJECTIVE / OVERNIGHT EVENTS:  ADDITIONAL REVIEW OF SYSTEMS:    MEDICATIONS  (STANDING):  dextrose 5%. 1000 milliLiter(s) (50 mL/Hr) IV Continuous <Continuous>  dextrose 5%. 1000 milliLiter(s) (100 mL/Hr) IV Continuous <Continuous>  dextrose 50% Injectable 25 Gram(s) IV Push once  dextrose 50% Injectable 12.5 Gram(s) IV Push once  dextrose 50% Injectable 25 Gram(s) IV Push once  enoxaparin Injectable 40 milliGRAM(s) SubCutaneous every 24 hours  glucagon  Injectable 1 milliGRAM(s) IntraMuscular once  insulin glargine Injectable (LANTUS) 10 Unit(s) SubCutaneous at bedtime  insulin lispro (ADMELOG) corrective regimen sliding scale   SubCutaneous three times a day before meals  insulin lispro (ADMELOG) corrective regimen sliding scale   SubCutaneous at bedtime  insulin lispro Injectable (ADMELOG) 2 Unit(s) SubCutaneous three times a day before meals  piperacillin/tazobactam IVPB.. 3.375 Gram(s) IV Intermittent every 8 hours    MEDICATIONS  (PRN):  acetaminophen     Tablet .. 650 milliGRAM(s) Oral every 6 hours PRN Temp greater or equal to 38C (100.4F), Mild Pain (1 - 3), Moderate Pain (4 - 6)  aluminum hydroxide/magnesium hydroxide/simethicone Suspension 30 milliLiter(s) Oral every 4 hours PRN Dyspepsia  dextrose Oral Gel 15 Gram(s) Oral once PRN Blood Glucose LESS THAN 70 milliGRAM(s)/deciliter  melatonin 3 milliGRAM(s) Oral at bedtime PRN Insomnia      CAPILLARY BLOOD GLUCOSE      POCT Blood Glucose.: 253 mg/dL (25 May 2022 22:10)  POCT Blood Glucose.: 274 mg/dL (25 May 2022 17:26)  POCT Blood Glucose.: 247 mg/dL (25 May 2022 12:03)    I&O's Summary      PHYSICAL EXAM:  Vital Signs Last 24 Hrs  T(C): 36.4 (26 May 2022 04:48), Max: 37.1 (25 May 2022 20:35)  T(F): 97.6 (26 May 2022 04:48), Max: 98.7 (25 May 2022 20:35)  HR: 78 (26 May 2022 04:48) (75 - 108)  BP: 145/60 (26 May 2022 04:48) (127/51 - 154/80)  RR: 18 (26 May 2022 04:48) (18 - 18)  SpO2: 96% (26 May 2022 04:48) (95% - 98%)    GENERAL: NAD, well-developed  HEAD:  Atraumatic, Normocephalic  EYES: EOMI, PERRLA, conjunctiva and sclera clear  NECK: Supple, No JVD  CHEST/LUNG: Clear to auscultation bilaterally; No wheeze  HEART: Regular rate and rhythm; No murmurs, rubs, or gallops  ABDOMEN: Soft, Nontender, Nondistended; Bowel sounds present  EXTREMITIES:  2+ Peripheral Pulses, No clubbing, cyanosis, or edema  PSYCH: AAOx3, appropriate affect  NEUROLOGY: non-focal, crisostomo  SKIN: No rashes or lesions    LABS:                        12.1   12.47 )-----------( 207      ( 25 May 2022 06:40 )             36.7     05-25    136  |  100  |  21  ----------------------------<  209<H>  3.8   |  23  |  0.96    Ca    9.0      25 May 2022 06:36  Phos  2.6     05-25  Mg     1.7     05-25    TPro  6.4  /  Alb  3.2<L>  /  TBili  0.8  /  DBili  x   /  AST  16  /  ALT  10  /  AlkPhos  69  05-24      Culture - Blood (collected 24 May 2022 21:29)  Source: .Blood Blood-Peripheral  Preliminary Report (26 May 2022 02:02):    No growth to date.    Culture - Blood (collected 24 May 2022 21:29)  Source: .Blood Blood-Venous  Preliminary Report (26 May 2022 02:02):    No growth to date.    Culture - Urine (collected 23 May 2022 22:47)  Source: Clean Catch Clean Catch (Midstream)  Final Report (24 May 2022 21:16):    No growth    Culture - Blood (collected 23 May 2022 18:50)  Source: .Blood Blood-Peripheral  Gram Stain (24 May 2022 19:21):    Growth in aerobic bottle: Gram Positive Cocci in Clusters  Final Report (25 May 2022 17:52):    Growth in aerobic bottle: Staphylococcus hominis    Coag Negative Staphylococcus    Single set isolate, possible contaminant. Contact    Microbiology if susceptibility testing clinically    indicated.    ***Blood Panel PCR results on this specimen are available    approximately 3 hours after the Gram stain result.***    Gram stain, PCR, and/or culture results may not always    correspond due to difference in methodologies.    ************************************************************    This PCR assay was performed by multiplex PCR. This    Assay tests for 66 bacterial and resistance gene targets.    Please refer to the [facility] test directory    at [email or web address] for details.  Organism: Blood Culture PCR (25 May 2022 17:52)  Organism: Blood Culture PCR (25 May 2022 17:52)    Culture - Blood (collected 23 May 2022 18:40)  Source: .Blood Blood-Peripheral  Preliminary Report (25 May 2022 02:01):    No growth to date.      pH, Venous: 7.36 (05-23-22 @ 21:25)  HCO3, Venous: 28 mmol/L (05-23-22 @ 21:25)  pCO2, Venous: 50 mmHg (05-23-22 @ 21:25)  pO2, Venous: 29 mmHg (05-23-22 @ 21:25)        RADIOLOGY & ADDITIONAL TESTS:  Results Reviewed:   Imaging Personally Reviewed:  Electrocardiogram Personally Reviewed:    COORDINATION OF CARE:  Care Discussed with Consultants/Other Providers [Y/N]:  Prior or Outpatient Records Reviewed [Y/N]:   Alexandro Wren MD, PGY-3  Available on [employer] Teams  ---------------------------------------------------------------------------------------------  Patient is a 86y old  Female who presents with a chief complaint of Terminal Ileitis (25 May 2022 09:54)    SUBJECTIVE / OVERNIGHT EVENTS: 2 bowel movements overnight. No other events. C/o lower R abdominal pain.      MEDICATIONS  (STANDING):  dextrose 5%. 1000 milliLiter(s) (50 mL/Hr) IV Continuous <Continuous>  dextrose 5%. 1000 milliLiter(s) (100 mL/Hr) IV Continuous <Continuous>  dextrose 50% Injectable 25 Gram(s) IV Push once  dextrose 50% Injectable 12.5 Gram(s) IV Push once  dextrose 50% Injectable 25 Gram(s) IV Push once  enoxaparin Injectable 40 milliGRAM(s) SubCutaneous every 24 hours  glucagon  Injectable 1 milliGRAM(s) IntraMuscular once  insulin glargine Injectable (LANTUS) 10 Unit(s) SubCutaneous at bedtime  insulin lispro (ADMELOG) corrective regimen sliding scale   SubCutaneous three times a day before meals  insulin lispro (ADMELOG) corrective regimen sliding scale   SubCutaneous at bedtime  insulin lispro Injectable (ADMELOG) 2 Unit(s) SubCutaneous three times a day before meals  piperacillin/tazobactam IVPB.. 3.375 Gram(s) IV Intermittent every 8 hours    MEDICATIONS  (PRN):  acetaminophen     Tablet .. 650 milliGRAM(s) Oral every 6 hours PRN Temp greater or equal to 38C (100.4F), Mild Pain (1 - 3), Moderate Pain (4 - 6)  aluminum hydroxide/magnesium hydroxide/simethicone Suspension 30 milliLiter(s) Oral every 4 hours PRN Dyspepsia  dextrose Oral Gel 15 Gram(s) Oral once PRN Blood Glucose LESS THAN 70 milliGRAM(s)/deciliter  melatonin 3 milliGRAM(s) Oral at bedtime PRN Insomnia      CAPILLARY BLOOD GLUCOSE      POCT Blood Glucose.: 253 mg/dL (25 May 2022 22:10)  POCT Blood Glucose.: 274 mg/dL (25 May 2022 17:26)  POCT Blood Glucose.: 247 mg/dL (25 May 2022 12:03)    I&O's Summary      PHYSICAL EXAM:  Vital Signs Last 24 Hrs  T(C): 36.4 (26 May 2022 04:48), Max: 37.1 (25 May 2022 20:35)  T(F): 97.6 (26 May 2022 04:48), Max: 98.7 (25 May 2022 20:35)  HR: 78 (26 May 2022 04:48) (75 - 108)  BP: 145/60 (26 May 2022 04:48) (127/51 - 154/80)  RR: 18 (26 May 2022 04:48) (18 - 18)  SpO2: 96% (26 May 2022 04:48) (95% - 98%)    GENERAL: NAD, sleeping in bed  CHEST/LUNG: Clear to auscultation bilaterally; No wheeze  HEART: Regular rate and rhythm; No murmurs, rubs, or gallops  ABDOMEN: soft, ttp R quadrants  EXTREMITIES: no peripheral edema  PSYCH: AAOx3, appropriate affect    LABS:                        12.1   12.47 )-----------( 207      ( 25 May 2022 06:40 )             36.7     05-25    136  |  100  |  21  ----------------------------<  209<H>  3.8   |  23  |  0.96    Ca    9.0      25 May 2022 06:36  Phos  2.6     05-25  Mg     1.7     05-25    TPro  6.4  /  Alb  3.2<L>  /  TBili  0.8  /  DBili  x   /  AST  16  /  ALT  10  /  AlkPhos  69  05-24      Culture - Blood (collected 24 May 2022 21:29)  Source: .Blood Blood-Peripheral  Preliminary Report (26 May 2022 02:02):    No growth to date.    Culture - Blood (collected 24 May 2022 21:29)  Source: .Blood Blood-Venous  Preliminary Report (26 May 2022 02:02):    No growth to date.    Culture - Urine (collected 23 May 2022 22:47)  Source: Clean Catch Clean Catch (Midstream)  Final Report (24 May 2022 21:16):    No growth    Culture - Blood (collected 23 May 2022 18:50)  Source: .Blood Blood-Peripheral  Gram Stain (24 May 2022 19:21):    Growth in aerobic bottle: Gram Positive Cocci in Clusters  Final Report (25 May 2022 17:52):    Growth in aerobic bottle: Staphylococcus hominis    Coag Negative Staphylococcus    Single set isolate, possible contaminant. Contact    Microbiology if susceptibility testing clinically    indicated.    ***Blood Panel PCR results on this specimen are available    approximately 3 hours after the Gram stain result.***    Gram stain, PCR, and/or culture results may not always    correspond due to difference in methodologies.    ************************************************************    This PCR assay was performed by multiplex PCR. This    Assay tests for 66 bacterial and resistance gene targets.    Please refer to the [facility] test directory    at [email or web address] for details.  Organism: Blood Culture PCR (25 May 2022 17:52)  Organism: Blood Culture PCR (25 May 2022 17:52)    Culture - Blood (collected 23 May 2022 18:40)  Source: .Blood Blood-Peripheral  Preliminary Report (25 May 2022 02:01):    No growth to date.      pH, Venous: 7.36 (05-23-22 @ 21:25)  HCO3, Venous: 28 mmol/L (05-23-22 @ 21:25)  pCO2, Venous: 50 mmHg (05-23-22 @ 21:25)  pO2, Venous: 29 mmHg (05-23-22 @ 21:25)        RADIOLOGY & ADDITIONAL TESTS:  Results Reviewed:   Imaging Personally Reviewed:  Electrocardiogram Personally Reviewed:    COORDINATION OF CARE:  Care Discussed with Consultants/Other Providers [Y/N]:  Prior or Outpatient Records Reviewed [Y/N]:

## 2022-05-26 NOTE — PROGRESS NOTE ADULT - PROBLEM SELECTOR PLAN 1
Admitted with severe sepsis (Tmax 101.2, leukocytosis, tachycardic to 116-123, 15.3% bands, lactate to 3.7); s/p total 1.5 L   - CT angio revealed distal terminal ileitis and concern for small intramural abscess   - CXR clear, UA negative, pending BCX  - f/u GI PCR and C. diff if stool sample allows  - c/w zosyn (5/24 - )  - BCx 1/2 bottles CoNS; repeat NGTD, likely contaminant, will not treat Admitted with severe sepsis (Tmax 101.2, leukocytosis, tachycardic to 116-123, 15.3% bands, lactate to 3.7); s/p total 1.5 L   - CT angio revealed distal terminal ileitis and concern for small intramural abscess   - CXR clear, UA negative, pending BCX  - f/u GI PCR and C. diff if stool sample allows  - c/w zosyn (5/24 - )  - BCx 5/23 grew 1 out of 2 bottles CoNS; repeat BCx 4/24 NGTD, likely contaminant, will not treat

## 2022-05-27 LAB
ANION GAP SERPL CALC-SCNC: 12 MMOL/L — SIGNIFICANT CHANGE UP (ref 5–17)
BUN SERPL-MCNC: 17 MG/DL — SIGNIFICANT CHANGE UP (ref 7–23)
CALCIUM SERPL-MCNC: 9 MG/DL — SIGNIFICANT CHANGE UP (ref 8.4–10.5)
CHLORIDE SERPL-SCNC: 99 MMOL/L — SIGNIFICANT CHANGE UP (ref 96–108)
CO2 SERPL-SCNC: 24 MMOL/L — SIGNIFICANT CHANGE UP (ref 22–31)
CREAT SERPL-MCNC: 0.92 MG/DL — SIGNIFICANT CHANGE UP (ref 0.5–1.3)
EGFR: 61 ML/MIN/1.73M2 — SIGNIFICANT CHANGE UP
GLUCOSE BLDC GLUCOMTR-MCNC: 191 MG/DL — HIGH (ref 70–99)
GLUCOSE BLDC GLUCOMTR-MCNC: 204 MG/DL — HIGH (ref 70–99)
GLUCOSE BLDC GLUCOMTR-MCNC: 213 MG/DL — HIGH (ref 70–99)
GLUCOSE BLDC GLUCOMTR-MCNC: 230 MG/DL — HIGH (ref 70–99)
GLUCOSE BLDC GLUCOMTR-MCNC: 299 MG/DL — HIGH (ref 70–99)
GLUCOSE SERPL-MCNC: 166 MG/DL — HIGH (ref 70–99)
HCT VFR BLD CALC: 35.8 % — SIGNIFICANT CHANGE UP (ref 34.5–45)
HGB BLD-MCNC: 11.9 G/DL — SIGNIFICANT CHANGE UP (ref 11.5–15.5)
MAGNESIUM SERPL-MCNC: 1.9 MG/DL — SIGNIFICANT CHANGE UP (ref 1.6–2.6)
MCHC RBC-ENTMCNC: 31.2 PG — SIGNIFICANT CHANGE UP (ref 27–34)
MCHC RBC-ENTMCNC: 33.2 GM/DL — SIGNIFICANT CHANGE UP (ref 32–36)
MCV RBC AUTO: 94 FL — SIGNIFICANT CHANGE UP (ref 80–100)
NRBC # BLD: 0 /100 WBCS — SIGNIFICANT CHANGE UP (ref 0–0)
PHOSPHATE SERPL-MCNC: 3.4 MG/DL — SIGNIFICANT CHANGE UP (ref 2.5–4.5)
PLATELET # BLD AUTO: 251 K/UL — SIGNIFICANT CHANGE UP (ref 150–400)
POTASSIUM SERPL-MCNC: 3.6 MMOL/L — SIGNIFICANT CHANGE UP (ref 3.5–5.3)
POTASSIUM SERPL-SCNC: 3.6 MMOL/L — SIGNIFICANT CHANGE UP (ref 3.5–5.3)
RBC # BLD: 3.81 M/UL — SIGNIFICANT CHANGE UP (ref 3.8–5.2)
RBC # FLD: 12.3 % — SIGNIFICANT CHANGE UP (ref 10.3–14.5)
SARS-COV-2 RNA SPEC QL NAA+PROBE: SIGNIFICANT CHANGE UP
SODIUM SERPL-SCNC: 135 MMOL/L — SIGNIFICANT CHANGE UP (ref 135–145)
WBC # BLD: 13.03 K/UL — HIGH (ref 3.8–10.5)
WBC # FLD AUTO: 13.03 K/UL — HIGH (ref 3.8–10.5)

## 2022-05-27 PROCEDURE — 78290 INTESTINE IMAGING: CPT | Mod: 26

## 2022-05-27 PROCEDURE — 99232 SBSQ HOSP IP/OBS MODERATE 35: CPT | Mod: GC

## 2022-05-27 PROCEDURE — 99232 SBSQ HOSP IP/OBS MODERATE 35: CPT

## 2022-05-27 RX ORDER — INSULIN LISPRO 100/ML
4 VIAL (ML) SUBCUTANEOUS
Refills: 0 | Status: DISCONTINUED | OUTPATIENT
Start: 2022-05-27 | End: 2022-06-06

## 2022-05-27 RX ADMIN — PIPERACILLIN AND TAZOBACTAM 25 GRAM(S): 4; .5 INJECTION, POWDER, LYOPHILIZED, FOR SOLUTION INTRAVENOUS at 04:38

## 2022-05-27 RX ADMIN — Medication 3: at 17:46

## 2022-05-27 RX ADMIN — FAMOTIDINE 110 MILLIGRAM(S): 10 INJECTION INTRAVENOUS at 06:57

## 2022-05-27 RX ADMIN — INSULIN GLARGINE 5 UNIT(S): 100 INJECTION, SOLUTION SUBCUTANEOUS at 21:40

## 2022-05-27 RX ADMIN — Medication 4 UNIT(S): at 12:45

## 2022-05-27 RX ADMIN — Medication 4 UNIT(S): at 17:46

## 2022-05-27 RX ADMIN — Medication 2: at 11:35

## 2022-05-27 RX ADMIN — PIPERACILLIN AND TAZOBACTAM 25 GRAM(S): 4; .5 INJECTION, POWDER, LYOPHILIZED, FOR SOLUTION INTRAVENOUS at 21:10

## 2022-05-27 RX ADMIN — Medication 2: at 12:44

## 2022-05-27 RX ADMIN — ENOXAPARIN SODIUM 40 MILLIGRAM(S): 100 INJECTION SUBCUTANEOUS at 17:52

## 2022-05-27 RX ADMIN — PIPERACILLIN AND TAZOBACTAM 25 GRAM(S): 4; .5 INJECTION, POWDER, LYOPHILIZED, FOR SOLUTION INTRAVENOUS at 11:36

## 2022-05-27 NOTE — PROGRESS NOTE ADULT - PROBLEM SELECTOR PLAN 3
Endorses b/l shoulder and upper back pain that she has had for "long time"   -no temporality to it, worsens w/ movement, non tender to palpation   -CTA negative for aortic dissection   -differential includes MSK   -ESR elevated, but may be in the setting of infection  -tylenol PRN for symptom control Appears to be a chronic issue; Endorses b/l shoulder and upper back pain that she has had for "long time"   -no temporality to it, worsens w/ movement, non tender to palpation   -CTA negative for aortic dissection   -differential includes MSK   -ESR elevated, but may be in the setting of infection  -tylenol PRN for symptom control

## 2022-05-27 NOTE — PROGRESS NOTE ADULT - PROBLEM SELECTOR PLAN 6
Dispo: pending clinical improvement  PT eval: ambulate as tolerated Dispo: pending clinical improvement  PT eval: ambulate as tolerated  Diet: Low fiber, advanced diet

## 2022-05-27 NOTE — PROGRESS NOTE ADULT - ASSESSMENT
86F presenting w/ abd pain. Found to have inflammation and possible abscess in terminal ileum. No chronic GI symptoms at baseline.    Impression:  #Terminal ileitis - D/dx most likely infectious given acute onset. Less likely inflammatory or malignant.  #RUQ pain - new since yesterday.     Recommendations:  - Discussed with surgery - possible Meckel's on review of imaging, will hold off on colonoscopy for now  - F/u Meckel's scan  final read  - Check GI PCR if having BMs  - IV Abx per primary team.  - Check RUQ U/S    GI will continue to follow.     Recommendations preliminary until signed by attending.     Preliminary note until signed by Attending.    Thank you for involving us in this patient's care.    Agnes Hernandez MD  Gastroenterology/Hepatology Fellow, PGY-V    NON-URGENT CONSULTS:  Please email [email or web address]  [email or web address]  AT NIGHT AND ON WEEKENDS:  Contact on-call GI fellow via answering service ([phone]) from 5pm-8am and on weekends/holidays  MONDAY-FRIDAY 8AM-5PM:  Pager# [phone] ([facility])  GI Phone# [phone] ([facility])   86F presenting w/ abd pain. Found to have inflammation and possible abscess in terminal ileum. No chronic GI symptoms at baseline.    Impression:  #Terminal ileitis - D/dx most likely infectious given acute onset. Less likely inflammatory or malignant.  #RUQ pain, improving - GB sludge with non specific GB wall thickening  - Meckel's scan w/o ectopic gastric mucosa    Recommendations:  - f/u Surgery recs  - Hold off on colonoscopy at this time, low suspicion of IBD  - Check GI PCR if having BMs  - IV Abx per primary team.    Preliminary note until signed by Attending.    Thank you for involving us in this patient's care.    Agnes Hernandez MD  Gastroenterology/Hepatology Fellow, PGY-V    NON-URGENT CONSULTS:  Please email [email or web address] OR  [email or web address]  AT NIGHT AND ON WEEKENDS:  Contact on-call GI fellow via answering service ([phone]) from 5pm-8am and on weekends/holidays  MONDAY-FRIDAY 8AM-5PM:  Pager# [phone] ([facility])  GI Phone# [phone] ([facility])   86F presenting w/ abd pain. Found to have inflammation and possible abscess in terminal ileum. No chronic GI symptoms at baseline.    Impression:  #Terminal ileitis - D/dx most likely infectious given acute onset. Less likely inflammatory or malignant.  #RUQ pain, improving - GB sludge with non specific GB wall thickening  - Meckel's scan w/o ectopic gastric mucosa    Recommendations:  - f/u Surgery recs  - GI PCR is negative  - IV Abx per primary team.  - Repeat CT this weekend per surgery  - If still no etiology and pain persists, would still offer colonoscopy next week    Preliminary note until signed by Attending.    Thank you for involving us in this patient's care.    Agnes Hernandez MD  Gastroenterology/Hepatology Fellow, PGY-V    NON-URGENT CONSULTS:  Please email [email or web address]  AT NIGHT AND ON WEEKENDS:  Contact on-call GI fellow via answering service ([phone]) from 5pm-8am and on weekends/holidays  MONDAY-FRIDAY 8AM-5PM:  Pager# [phone] ([facility])  GI Phone# [phone] ([facility])

## 2022-05-27 NOTE — PROGRESS NOTE ADULT - PROBLEM SELECTOR PLAN 7
R/o meckel's diverticulum per surgery  - meckels scan scheduled 8Am 5/27  - NPO after MN  - Famotidine 50 x 1 ordered per surgery  - Salgado per surgery, will d/c when not necessary per surg R/o meckel's diverticulum per surgery  - meckels scan scheduled 8Am 5/27  - Famotidine 50 x 1 ordered per surgery  - Salgado per surgery, will d/c when not necessary per surg R/o meckel's diverticulum per surgery  - meckels scan scheduled 8Am 5/27  - Famotidine 50 x 1 ordered per surgery  - DC jackson and commence TOV

## 2022-05-27 NOTE — PROGRESS NOTE ADULT - SUBJECTIVE AND OBJECTIVE BOX
Chief Complaint:  Patient is a 86y old  Female who presents with a chief complaint of Terminal Ileitis (27 May 2022 11:16)        Interval Events:   Hgb stable, no overt bleeding reported  Meckel scan pending    Hospital Medications:  acetaminophen     Tablet .. 650 milliGRAM(s) Oral every 6 hours PRN  aluminum hydroxide/magnesium hydroxide/simethicone Suspension 30 milliLiter(s) Oral every 4 hours PRN  dextrose 5%. 1000 milliLiter(s) IV Continuous <Continuous>  dextrose 5%. 1000 milliLiter(s) IV Continuous <Continuous>  dextrose 50% Injectable 25 Gram(s) IV Push once  dextrose 50% Injectable 12.5 Gram(s) IV Push once  dextrose 50% Injectable 25 Gram(s) IV Push once  dextrose Oral Gel 15 Gram(s) Oral once PRN  enoxaparin Injectable 40 milliGRAM(s) SubCutaneous every 24 hours  glucagon  Injectable 1 milliGRAM(s) IntraMuscular once  insulin glargine Injectable (LANTUS) 5 Unit(s) SubCutaneous at bedtime  insulin lispro (ADMELOG) corrective regimen sliding scale   SubCutaneous three times a day before meals  insulin lispro (ADMELOG) corrective regimen sliding scale   SubCutaneous at bedtime  insulin lispro Injectable (ADMELOG) 4 Unit(s) SubCutaneous three times a day before meals  melatonin 3 milliGRAM(s) Oral at bedtime PRN  piperacillin/tazobactam IVPB.. 3.375 Gram(s) IV Intermittent every 8 hours      ROS:  Complete and normal except as mentioned above.    PHYSICAL EXAM:   Vital Signs:  Vital Signs Last 24 Hrs  T(C): 36.7 (27 May 2022 14:19), Max: 37.5 (26 May 2022 21:17)  T(F): 98.1 (27 May 2022 14:19), Max: 99.5 (26 May 2022 21:17)  HR: 69 (27 May 2022 14:19) (69 - 82)  BP: 140/70 (27 May 2022 14:19) (90/50 - 140/70)  BP(mean): --  RR: 18 (27 May 2022 14:19) (18 - 18)  SpO2: 98% (27 May 2022 14:19) (96% - 98%)  Daily     Daily     GENERAL: no acute distress  NEURO: alert  HEENT: anicteric sclera  CHEST: no respiratory distress, no accessory muscle use  ABDOMEN: soft, TTP RUQ and lower abd   EXTREMITIES: warm, well perfused, no edema  SKIN: no jaundice  LABS: reviewed                        11.9   13.03 )-----------( 251      ( 27 May 2022 07:10 )             35.8     05-27    135  |  99  |  17  ----------------------------<  166<H>  3.6   |  24  |  0.92    Ca    9.0      27 May 2022 07:05  Phos  3.4     05-27  Mg     1.9     05-27          Interval Diagnostic Studies: see sunrise for full report   Chief Complaint:  Patient is a 86y old  Female who presents with a chief complaint of Terminal Ileitis (27 May 2022 11:16)        Interval Events:   Hgb stable, no overt bleeding reported  Discussed with nurse, no BMs overnight.  Reporting improved abdominal pain.  Meckel scan without ectopic gastric mucosa.  US with mild ascites, normal liver, GB sludge, non specific GB wall thickening, CBD 4 mm, unchanged pancreatic head/body ductal dilatation of 0.4 cm, renal cysts        Hospital Medications:  acetaminophen     Tablet .. 650 milliGRAM(s) Oral every 6 hours PRN  aluminum hydroxide/magnesium hydroxide/simethicone Suspension 30 milliLiter(s) Oral every 4 hours PRN  dextrose 5%. 1000 milliLiter(s) IV Continuous <Continuous>  dextrose 5%. 1000 milliLiter(s) IV Continuous <Continuous>  dextrose 50% Injectable 25 Gram(s) IV Push once  dextrose 50% Injectable 12.5 Gram(s) IV Push once  dextrose 50% Injectable 25 Gram(s) IV Push once  dextrose Oral Gel 15 Gram(s) Oral once PRN  enoxaparin Injectable 40 milliGRAM(s) SubCutaneous every 24 hours  glucagon  Injectable 1 milliGRAM(s) IntraMuscular once  insulin glargine Injectable (LANTUS) 5 Unit(s) SubCutaneous at bedtime  insulin lispro (ADMELOG) corrective regimen sliding scale   SubCutaneous three times a day before meals  insulin lispro (ADMELOG) corrective regimen sliding scale   SubCutaneous at bedtime  insulin lispro Injectable (ADMELOG) 4 Unit(s) SubCutaneous three times a day before meals  melatonin 3 milliGRAM(s) Oral at bedtime PRN  piperacillin/tazobactam IVPB.. 3.375 Gram(s) IV Intermittent every 8 hours      ROS:  Complete and normal except as mentioned above.    PHYSICAL EXAM:   Vital Signs:  Vital Signs Last 24 Hrs  T(C): 36.7 (27 May 2022 14:19), Max: 37.5 (26 May 2022 21:17)  T(F): 98.1 (27 May 2022 14:19), Max: 99.5 (26 May 2022 21:17)  HR: 69 (27 May 2022 14:19) (69 - 82)  BP: 140/70 (27 May 2022 14:19) (90/50 - 140/70)  BP(mean): --  RR: 18 (27 May 2022 14:19) (18 - 18)  SpO2: 98% (27 May 2022 14:19) (96% - 98%)  Daily     Daily     GENERAL: no acute distress  NEURO: alert  HEENT: anicteric sclera  CHEST: no respiratory distress, no accessory muscle use  ABDOMEN: soft, NTND  EXTREMITIES: warm, well perfused, no edema  SKIN: no jaundice  LABS: reviewed                        11.9   13.03 )-----------( 251      ( 27 May 2022 07:10 )             35.8     05-27    135  |  99  |  17  ----------------------------<  166<H>  3.6   |  24  |  0.92    Ca    9.0      27 May 2022 07:05  Phos  3.4     05-27  Mg     1.9     05-27          Interval Diagnostic Studies: see sunrise for full report

## 2022-05-27 NOTE — PROGRESS NOTE ADULT - PROBLEM SELECTOR PLAN 1
Admitted with severe sepsis (Tmax 101.2, leukocytosis, tachycardic to 116-123, 15.3% bands, lactate to 3.7); s/p total 1.5 L   - CT angio revealed distal terminal ileitis and concern for small intramural abscess   - CXR clear, UA negative, pending BCX  - f/u GI PCR and C. diff if stool sample allows  - c/w zosyn (5/24 - )  - BCx 5/23 grew 1 out of 2 bottles CoNS; repeat BCx 4/24 NGTD, likely contaminant, will not treat Admitted with severe sepsis (Tmax 101.2, leukocytosis, tachycardic to 116-123, 15.3% bands, lactate to 3.7); s/p total 1.5 L   - CT angio revealed distal terminal ileitis and concern for small intramural abscess   - CXR clear, UA negative, pending BCX  - f/u GI PCR and C. diff   - c/w zosyn (5/24 - )  - BCx 5/23 grew 1 out of 2 bottles CoNS; repeat BCx 4/24 NGTD, likely contaminant, will not treat

## 2022-05-27 NOTE — PROGRESS NOTE ADULT - ATTENDING COMMENTS
Agree with above. Mecke's scan is negative. Pending repeat CT this weekend to r/o developing abscess as cause of continued pain. If repeat imaging no findings still and pain persists, colonoscopy for direct visualization is still an option. Class II - visualization of the soft palate, fauces, and uvula

## 2022-05-27 NOTE — PROGRESS NOTE ADULT - PROBLEM SELECTOR PLAN 4
Per Pharmacy, patient takes prandin and amaryl at home   - hold prandin and amaryl during inpatient   - A1C 9.3  - Start patient on lantus 10 and mealtime 2U; titrate to 100-180 FS  - c/w DIGNA/FS qac, qhs  - carb consistent low residual diet  - to convert to liquids if GI determines patient indicated for scope Per Pharmacy, patient takes prandin and amaryl at home   - hold prandin and amaryl during inpatient   - A1C 9.3  - c/w patient on lantus 5 and mealtime 2U; titrate to 100-180 FS  - c/w DIGNA/FS qac, qhs  - carb consistent low residual diet  - to convert to liquids if GI determines patient indicated for scope Per Pharmacy, patient takes prandin and amaryl at home   - hold prandin and amaryl during inpatient   - A1C 9.3  - c/w patient on lantus 5 and increase mealtime to 4U; titrate to 100-180 FS  - c/w DIGNA/FS qac, qhs  - carb consistent low residual diet  - to convert to liquids if GI determines patient indicated for scope

## 2022-05-27 NOTE — PROGRESS NOTE ADULT - ATTENDING COMMENTS
Patient is an 86 y.o F w/ PMH T2DM who presents w/ abdominal pain, found to have severe sepsis int he setting of terminal ileitis and possible intramural abscess as seen on CTA. Found to have RUQ pain of unclear etiology    #Terminal ileitis  -Initially admitted w/ severe sepsis likely 2/2 terminal ileitis as seen on imaging; UA and BCX NGTD  -CT angio revealed distal terminal ileitis and c/f small intramural abscess  -surgery consulted; no surgical intervention warranted as abscess too small for percutaneous drainage   -c/w zosyn  -GI consulted; f/u infectious work up, altho patient unable to provide stool sample. May consider colonoscopy   -Per surgery, concern for meckel's   -c/w low residual diet      #RUQ pain   -Patient continues to endorse RUQ pain, unclear etiology   -RUQ U/S revealed gall bladder sludge, some edema in gall bladder well in the setting of ascites   -Per surgery, after review of imaging concern for meckel's diverticulum-pending meckel's scan tomorrow  -NPO after midnight and famotidine tomorrow AM      #T2DM  -Hx of T2DM, home medicaitons include prandin and amaryl, had been on metformin but unclear why discontinued   -lss, monitor FS before meals and at bedtime  -carb consistent diet   -A1c 9.3%, will likely need to start insulin therapy and f/u outpatient endocrine     Dispo: Pending clinical improvement and meckel's scan   order PT. Patient is an 86 y.o F w/ PMH T2DM who presents w/ abdominal pain, found to have severe sepsis int he setting of terminal ileitis and possible intramural abscess as seen on CTA. Found to have RUQ pain of unclear etiology    #Terminal ileitis  -Initially admitted w/ severe sepsis likely 2/2 terminal ileitis as seen on imaging; UA and BCX NGTD  -CT angio revealed distal terminal ileitis and c/f small intramural abscess  -surgery consulted; no surgical intervention warranted as abscess too small for percutaneous drainage   -c/w zosyn  -f/u stool PCR   -GI consulted; possible colonoscopy next week  -c/w low residual diet      #RUQ pain   -Patient continues to endorse RUQ pain, unclear etiology   -RUQ U/S revealed gall bladder sludge, some edema in gall bladder well in the setting of ascites   -Meckel scan (5/27) negative for ectopic tissue  -plan for possible colonoscoyp next week     #T2DM  -Hx of T2DM, home medicaitons include prandin and amaryl, had been on metformin but unclear why discontinued   -lss, monitor FS before meals and at bedtime  -carb consistent diet   -c/w lantus 5 U qhs and admelog 4 U TID premeal  -A1c 9.3%, will likely need to start insulin therapy and f/u outpatient endocrine     Dispo: pending clinical improvement, possible colonoscopy next week   PT recommend home w/ home PT

## 2022-05-27 NOTE — PROGRESS NOTE ADULT - SUBJECTIVE AND OBJECTIVE BOX
[facility] Progress Note     Overnight events:  - received 500cc bolus overnight for low pressures   - No fevers/chills.  - Patient seen and evaluated at bedside.  - Mild abdominal pain, bandlike       SUBJECTIVE: Pt seen and examined at bedside. She was laying in bed comfortably. She states she is still having lower abdominal tenderness. She denies any CP, SOB, fever, chills, n/v/d        Vital Signs Last 24 Hrs  T(C): 37.4 (27 May 2022 04:21), Max: 37.5 (26 May 2022 21:17)  T(F): 99.4 (27 May 2022 04:21), Max: 99.5 (26 May 2022 21:17)  HR: 77 (27 May 2022 04:21) (76 - 82)  BP: 136/60 (27 May 2022 04:21) (90/50 - 139/68)  BP(mean): --  RR: 18 (27 May 2022 04:21) (18 - 18)  SpO2: 96% (27 May 2022 04:21) (95% - 98%)  I&O's Summary    26 May 2022 07:01  -  27 May 2022 07:00  --------------------------------------------------------  IN: 0 mL / OUT: 404 mL / NET: -404 mL      I&O's Detail    26 May 2022 07:01  -  27 May 2022 07:00  --------------------------------------------------------  IN:  Total IN: 0 mL    OUT:    Indwelling Catheter - Urethral (mL): 400 mL    Voided (mL): 4 mL  Total OUT: 404 mL    Total NET: -404 mL          Labs:                         11.9   13.03 )-----------( 251      ( 27 May 2022 07:10 )             35.8     05-27    135  |  99  |  17  ----------------------------<  166<H>  3.6   |  24  |  0.92    Ca    9.0      27 May 2022 07:05  Phos  3.4     05-27  Mg     1.9     05-27            Physical Exam:  CONSTITUTIONAL: NAD  RESPIRATORY: Normal respiratory effort; Non labored breathing  ABDOMEN: Diffuse tenderness to palpation, no guarding or rebound tenderness  GENITOURINARY: With jackson   MUSCULOSKELETAL: no clubbing or cyanosis of digits; no joint swelling or tenderness to palpation

## 2022-05-27 NOTE — PROGRESS NOTE ADULT - SUBJECTIVE AND OBJECTIVE BOX
PROGRESS NOTE:     CONTACT INFO:  Du Landin MD | PGY-1  Pager: [phone] [place], [id number] [facility]  Also on TEAMS  After 7pm page night float  On weekends after 1pm check resident assignment tab to see who is covering      Patient is a 86y old  Female who presents with a chief complaint of Terminal Ileitis (26 May 2022 09:17)      SUBJECTIVE / OVERNIGHT EVENTS:    - No acute events overnight.   - No fevers/chills.  - Patient seen and evaluated at bedside.  - Denies SOB at rest, chest pain, palpitations, abdominal pain, nausea/vomiting    ADDITIONAL REVIEW OF SYSTEMS:    MEDICATIONS  (STANDING):  dextrose 5%. 1000 milliLiter(s) (50 mL/Hr) IV Continuous <Continuous>  dextrose 5%. 1000 milliLiter(s) (100 mL/Hr) IV Continuous <Continuous>  dextrose 50% Injectable 25 Gram(s) IV Push once  dextrose 50% Injectable 12.5 Gram(s) IV Push once  dextrose 50% Injectable 25 Gram(s) IV Push once  enoxaparin Injectable 40 milliGRAM(s) SubCutaneous every 24 hours  glucagon  Injectable 1 milliGRAM(s) IntraMuscular once  insulin glargine Injectable (LANTUS) 5 Unit(s) SubCutaneous at bedtime  insulin lispro (ADMELOG) corrective regimen sliding scale   SubCutaneous three times a day before meals  insulin lispro (ADMELOG) corrective regimen sliding scale   SubCutaneous at bedtime  insulin lispro Injectable (ADMELOG) 2 Unit(s) SubCutaneous three times a day before meals  piperacillin/tazobactam IVPB.. 3.375 Gram(s) IV Intermittent every 8 hours    MEDICATIONS  (PRN):  acetaminophen     Tablet .. 650 milliGRAM(s) Oral every 6 hours PRN Temp greater or equal to 38C (100.4F), Mild Pain (1 - 3), Moderate Pain (4 - 6)  aluminum hydroxide/magnesium hydroxide/simethicone Suspension 30 milliLiter(s) Oral every 4 hours PRN Dyspepsia  dextrose Oral Gel 15 Gram(s) Oral once PRN Blood Glucose LESS THAN 70 milliGRAM(s)/deciliter  melatonin 3 milliGRAM(s) Oral at bedtime PRN Insomnia      CAPILLARY BLOOD GLUCOSE      POCT Blood Glucose.: 300 mg/dL (26 May 2022 21:34)  POCT Blood Glucose.: 324 mg/dL (26 May 2022 18:30)  POCT Blood Glucose.: 203 mg/dL (26 May 2022 12:04)  POCT Blood Glucose.: 208 mg/dL (26 May 2022 08:10)    I&O's Summary    26 May 2022 07:01  -  27 May 2022 07:00  --------------------------------------------------------  IN: 0 mL / OUT: 404 mL / NET: -404 mL        PHYSICAL EXAM:  Vital Signs Last 24 Hrs  T(C): 37.4 (27 May 2022 04:21), Max: 37.5 (26 May 2022 21:17)  T(F): 99.4 (27 May 2022 04:21), Max: 99.5 (26 May 2022 21:17)  HR: 77 (27 May 2022 04:21) (76 - 99)  BP: 136/60 (27 May 2022 04:21) (90/50 - 142/69)  BP(mean): --  RR: 18 (27 May 2022 04:21) (18 - 18)  SpO2: 96% (27 May 2022 04:21) (95% - 98%)    CONSTITUTIONAL: NAD, well-developed  RESPIRATORY: Normal respiratory effort; lungs are clear to auscultation bilaterally  CARDIOVASCULAR: Regular rate and rhythm, normal S1 and S2, no murmur/rub/gallop; No lower extremity edema; Peripheral pulses are 2+ bilaterally  ABDOMEN: Nontender to palpation, normoactive bowel sounds, no rebound/guarding; No hepatosplenomegaly  MUSCULOSKELETAL: no clubbing or cyanosis of digits; no joint swelling or tenderness to palpation  PSYCH: A+O to person, place, and time; affect appropriate    LABS:                    GI PCR Panel, Stool (collected 26 May 2022 17:07)  Source: .Stool Feces  Final Report (26 May 2022 22:38):    GI PCR Results: NOT detected    *******Please Note:*******    GI panel PCR evaluates for:    Campylobacter, Plesiomonas shigelloides, Salmonella,    Vibrio, Yersinia enterocolitica, Enteroaggregative    Escherichia coli (EAEC), Enteropathogenic E.coli (EPEC),    Enterotoxigenic E. coli (ETEC) lt/st, Shiga-like    toxin-producing E. coli (STEC) stx1/stx2,    Shigella/ Enteroinvasive E. coli (EIEC), Cryptosporidium,    Cyclospora cayetanensis, Entamoeba histolytica,    Giardia lamblia, Adenovirus F 40/41, Astrovirus,    Norovirus GI/GII, Rotavirus A, Sapovirus    Culture - Blood (collected 24 May 2022 21:29)  Source: .Blood Blood-Peripheral  Preliminary Report (26 May 2022 02:02):    No growth to date.    Culture - Blood (collected 24 May 2022 21:29)  Source: .Blood Blood-Venous  Preliminary Report (26 May 2022 02:02):    No growth to date.        RADIOLOGY & ADDITIONAL TESTS:  Results Reviewed:   Imaging Personally Reviewed:  Electrocardiogram Personally Reviewed:    COORDINATION OF CARE:  Care Discussed with Consultants/Other Providers [Y/N]: Y  Prior or Outpatient Records Reviewed [Y/N]: Y   PROGRESS NOTE:     CONTACT INFO:  Du Landin MD | PGY-1  Pager: [phone] [place], [id number] [facility]  Also on TEAMS  After 7pm page night float  On weekends after 1pm check resident assignment tab to see who is covering      Patient is a 86y old  Female who presents with a chief complaint of Terminal Ileitis (26 May 2022 09:17)      SUBJECTIVE / OVERNIGHT EVENTS:    - received 500cc bolus overnight for low pressures   - No fevers/chills.  - Patient seen and evaluated at bedside.  - Mild abdominal pain, bandlike   - Denies SOB at rest, chest pain, palpitations, nausea/vomiting    ADDITIONAL REVIEW OF SYSTEMS:    MEDICATIONS  (STANDING):  dextrose 5%. 1000 milliLiter(s) (50 mL/Hr) IV Continuous <Continuous>  dextrose 5%. 1000 milliLiter(s) (100 mL/Hr) IV Continuous <Continuous>  dextrose 50% Injectable 25 Gram(s) IV Push once  dextrose 50% Injectable 12.5 Gram(s) IV Push once  dextrose 50% Injectable 25 Gram(s) IV Push once  enoxaparin Injectable 40 milliGRAM(s) SubCutaneous every 24 hours  glucagon  Injectable 1 milliGRAM(s) IntraMuscular once  insulin glargine Injectable (LANTUS) 5 Unit(s) SubCutaneous at bedtime  insulin lispro (ADMELOG) corrective regimen sliding scale   SubCutaneous three times a day before meals  insulin lispro (ADMELOG) corrective regimen sliding scale   SubCutaneous at bedtime  insulin lispro Injectable (ADMELOG) 2 Unit(s) SubCutaneous three times a day before meals  piperacillin/tazobactam IVPB.. 3.375 Gram(s) IV Intermittent every 8 hours    MEDICATIONS  (PRN):  acetaminophen     Tablet .. 650 milliGRAM(s) Oral every 6 hours PRN Temp greater or equal to 38C (100.4F), Mild Pain (1 - 3), Moderate Pain (4 - 6)  aluminum hydroxide/magnesium hydroxide/simethicone Suspension 30 milliLiter(s) Oral every 4 hours PRN Dyspepsia  dextrose Oral Gel 15 Gram(s) Oral once PRN Blood Glucose LESS THAN 70 milliGRAM(s)/deciliter  melatonin 3 milliGRAM(s) Oral at bedtime PRN Insomnia      CAPILLARY BLOOD GLUCOSE      POCT Blood Glucose.: 300 mg/dL (26 May 2022 21:34)  POCT Blood Glucose.: 324 mg/dL (26 May 2022 18:30)  POCT Blood Glucose.: 203 mg/dL (26 May 2022 12:04)  POCT Blood Glucose.: 208 mg/dL (26 May 2022 08:10)    I&O's Summary    26 May 2022 07:01  -  27 May 2022 07:00  --------------------------------------------------------  IN: 0 mL / OUT: 404 mL / NET: -404 mL        PHYSICAL EXAM:  Vital Signs Last 24 Hrs  T(C): 37.4 (27 May 2022 04:21), Max: 37.5 (26 May 2022 21:17)  T(F): 99.4 (27 May 2022 04:21), Max: 99.5 (26 May 2022 21:17)  HR: 77 (27 May 2022 04:21) (76 - 99)  BP: 136/60 (27 May 2022 04:21) (90/50 - 142/69)  BP(mean): --  RR: 18 (27 May 2022 04:21) (18 - 18)  SpO2: 96% (27 May 2022 04:21) (95% - 98%)    CONSTITUTIONAL: NAD, age-appropriate   RESPIRATORY: Normal respiratory effort; lungs are clear to auscultation bilaterally  CARDIOVASCULAR: Regular rate and rhythm, normal S1 and S2, no murmur/rub/gallop; No lower extremity edema; Peripheral pulses are 2+ bilaterally  ABDOMEN: Diffuse tenderness to palpation, normoactive bowel sounds, +rebound/guarding;   GENITOURINARY: With jackson   MUSCULOSKELETAL: no clubbing or cyanosis of digits; no joint swelling or tenderness to palpation  PSYCH: A+O to person, place, and time; affect appropriate    LABS:                    GI PCR Panel, Stool (collected 26 May 2022 17:07)  Source: .Stool Feces  Final Report (26 May 2022 22:38):    GI PCR Results: NOT detected    *******Please Note:*******    GI panel PCR evaluates for:    Campylobacter, Plesiomonas shigelloides, Salmonella,    Vibrio, Yersinia enterocolitica, Enteroaggregative    Escherichia coli (EAEC), Enteropathogenic E.coli (EPEC),    Enterotoxigenic E. coli (ETEC) lt/st, Shiga-like    toxin-producing E. coli (STEC) stx1/stx2,    Shigella/ Enteroinvasive E. coli (EIEC), Cryptosporidium,    Cyclospora cayetanensis, Entamoeba histolytica,    Giardia lamblia, Adenovirus F 40/41, Astrovirus,    Norovirus GI/GII, Rotavirus A, Sapovirus    Culture - Blood (collected 24 May 2022 21:29)  Source: .Blood Blood-Peripheral  Preliminary Report (26 May 2022 02:02):    No growth to date.    Culture - Blood (collected 24 May 2022 21:29)  Source: .Blood Blood-Venous  Preliminary Report (26 May 2022 02:02):    No growth to date.        RADIOLOGY & ADDITIONAL TESTS:  Results Reviewed:   Imaging Personally Reviewed:  Electrocardiogram Personally Reviewed:    COORDINATION OF CARE:  Care Discussed with Consultants/Other Providers [Y/N]: Y  Prior or Outpatient Records Reviewed [Y/N]: Y

## 2022-05-27 NOTE — PROGRESS NOTE ADULT - ASSESSMENT
86F w/ PMH T2DM who presents w/ abdominal and shoulder pain, found to have severe sepsis int he setting of terminal ileitis and possible intramural abscess as seen on CTA. Meckel scan negative.    Plan:  -DVT ppx  -Continue ABX  -Rec CT scan over the weekend if WBC continue to climb  -care as per primary team    [facility]

## 2022-05-27 NOTE — PROGRESS NOTE ADULT - PROBLEM SELECTOR PLAN 5
DVT prophylaxis: lovenox daily   Diet: fiber diet   Dispo: pending PT   HTN: bps elevated to sbp 140s, likely undiagnosed HTN. Will continue to monitor for now at this time DVT prophylaxis: lovenox daily   Diet: fiber diet   Dispo: PT recommend Home PT   HTN: bps elevated to sbp 140s, likely undiagnosed HTN. Will continue to monitor for now at this time

## 2022-05-27 NOTE — PROGRESS NOTE ADULT - ASSESSMENT
86F w/ PMH T2DM who presents w/ abdominal and shoulder pain, found to have severe sepsis int he setting of terminal ileitis and possible intramural abscess as seen on CTA. Pending infectious disease workup and possible scope by GI later in the week. 86F w/ PMH T2DM who presents w/ abdominal and shoulder pain, found to have severe sepsis int he setting of terminal ileitis and possible intramural abscess as seen on CTA. Pending Meckel scan  86F w/ PMH T2DM who presents w/ abdominal and shoulder pain, found to have severe sepsis int he setting of terminal ileitis and possible intramural abscess as seen on CTA. Meckel scan negative. Pending possible colonoscopy.

## 2022-05-28 LAB
ALBUMIN SERPL ELPH-MCNC: 2.5 G/DL — LOW (ref 3.3–5)
ALP SERPL-CCNC: 92 U/L — SIGNIFICANT CHANGE UP (ref 40–120)
ALT FLD-CCNC: 10 U/L — SIGNIFICANT CHANGE UP (ref 10–45)
ANION GAP SERPL CALC-SCNC: 12 MMOL/L — SIGNIFICANT CHANGE UP (ref 5–17)
AST SERPL-CCNC: 13 U/L — SIGNIFICANT CHANGE UP (ref 10–40)
BILIRUB SERPL-MCNC: 0.4 MG/DL — SIGNIFICANT CHANGE UP (ref 0.2–1.2)
BUN SERPL-MCNC: 18 MG/DL — SIGNIFICANT CHANGE UP (ref 7–23)
CALCIUM SERPL-MCNC: 8.5 MG/DL — SIGNIFICANT CHANGE UP (ref 8.4–10.5)
CHLORIDE SERPL-SCNC: 98 MMOL/L — SIGNIFICANT CHANGE UP (ref 96–108)
CO2 SERPL-SCNC: 25 MMOL/L — SIGNIFICANT CHANGE UP (ref 22–31)
CREAT SERPL-MCNC: 0.96 MG/DL — SIGNIFICANT CHANGE UP (ref 0.5–1.3)
CULTURE RESULTS: SIGNIFICANT CHANGE UP
CULTURE RESULTS: SIGNIFICANT CHANGE UP
EGFR: 58 ML/MIN/1.73M2 — LOW
GLUCOSE BLDC GLUCOMTR-MCNC: 174 MG/DL — HIGH (ref 70–99)
GLUCOSE BLDC GLUCOMTR-MCNC: 230 MG/DL — HIGH (ref 70–99)
GLUCOSE BLDC GLUCOMTR-MCNC: 260 MG/DL — HIGH (ref 70–99)
GLUCOSE BLDC GLUCOMTR-MCNC: 286 MG/DL — HIGH (ref 70–99)
GLUCOSE SERPL-MCNC: 255 MG/DL — HIGH (ref 70–99)
HCT VFR BLD CALC: 35.9 % — SIGNIFICANT CHANGE UP (ref 34.5–45)
HGB BLD-MCNC: 11.8 G/DL — SIGNIFICANT CHANGE UP (ref 11.5–15.5)
MAGNESIUM SERPL-MCNC: 1.8 MG/DL — SIGNIFICANT CHANGE UP (ref 1.6–2.6)
MCHC RBC-ENTMCNC: 30.8 PG — SIGNIFICANT CHANGE UP (ref 27–34)
MCHC RBC-ENTMCNC: 32.9 GM/DL — SIGNIFICANT CHANGE UP (ref 32–36)
MCV RBC AUTO: 93.7 FL — SIGNIFICANT CHANGE UP (ref 80–100)
NRBC # BLD: 0 /100 WBCS — SIGNIFICANT CHANGE UP (ref 0–0)
PHOSPHATE SERPL-MCNC: 3.1 MG/DL — SIGNIFICANT CHANGE UP (ref 2.5–4.5)
PLATELET # BLD AUTO: 286 K/UL — SIGNIFICANT CHANGE UP (ref 150–400)
POTASSIUM SERPL-MCNC: 3.9 MMOL/L — SIGNIFICANT CHANGE UP (ref 3.5–5.3)
POTASSIUM SERPL-SCNC: 3.9 MMOL/L — SIGNIFICANT CHANGE UP (ref 3.5–5.3)
PROT SERPL-MCNC: 6 G/DL — SIGNIFICANT CHANGE UP (ref 6–8.3)
RBC # BLD: 3.83 M/UL — SIGNIFICANT CHANGE UP (ref 3.8–5.2)
RBC # FLD: 12.1 % — SIGNIFICANT CHANGE UP (ref 10.3–14.5)
SODIUM SERPL-SCNC: 135 MMOL/L — SIGNIFICANT CHANGE UP (ref 135–145)
SPECIMEN SOURCE: SIGNIFICANT CHANGE UP
WBC # BLD: 13.55 K/UL — HIGH (ref 3.8–10.5)
WBC # FLD AUTO: 13.55 K/UL — HIGH (ref 3.8–10.5)

## 2022-05-28 PROCEDURE — 99232 SBSQ HOSP IP/OBS MODERATE 35: CPT

## 2022-05-28 PROCEDURE — 74177 CT ABD & PELVIS W/CONTRAST: CPT | Mod: 26

## 2022-05-28 PROCEDURE — 99232 SBSQ HOSP IP/OBS MODERATE 35: CPT | Mod: GC

## 2022-05-28 RX ORDER — INSULIN GLARGINE 100 [IU]/ML
10 INJECTION, SOLUTION SUBCUTANEOUS AT BEDTIME
Refills: 0 | Status: DISCONTINUED | OUTPATIENT
Start: 2022-05-28 | End: 2022-05-31

## 2022-05-28 RX ADMIN — INSULIN GLARGINE 10 UNIT(S): 100 INJECTION, SOLUTION SUBCUTANEOUS at 22:17

## 2022-05-28 RX ADMIN — Medication 2: at 18:10

## 2022-05-28 RX ADMIN — Medication 4 UNIT(S): at 18:10

## 2022-05-28 RX ADMIN — PIPERACILLIN AND TAZOBACTAM 25 GRAM(S): 4; .5 INJECTION, POWDER, LYOPHILIZED, FOR SOLUTION INTRAVENOUS at 12:15

## 2022-05-28 RX ADMIN — PIPERACILLIN AND TAZOBACTAM 25 GRAM(S): 4; .5 INJECTION, POWDER, LYOPHILIZED, FOR SOLUTION INTRAVENOUS at 21:13

## 2022-05-28 RX ADMIN — Medication 4 UNIT(S): at 13:40

## 2022-05-28 RX ADMIN — ENOXAPARIN SODIUM 40 MILLIGRAM(S): 100 INJECTION SUBCUTANEOUS at 18:10

## 2022-05-28 RX ADMIN — PIPERACILLIN AND TAZOBACTAM 25 GRAM(S): 4; .5 INJECTION, POWDER, LYOPHILIZED, FOR SOLUTION INTRAVENOUS at 05:21

## 2022-05-28 RX ADMIN — Medication 3: at 09:55

## 2022-05-28 RX ADMIN — Medication 3: at 13:40

## 2022-05-28 RX ADMIN — Medication 4 UNIT(S): at 09:53

## 2022-05-28 NOTE — PROGRESS NOTE ADULT - PROBLEM SELECTOR PLAN 1
Admitted with severe sepsis (Tmax 101.2, leukocytosis, tachycardic to 116-123, 15.3% bands, lactate to 3.7); s/p total 1.5 L   - CT angio revealed distal terminal ileitis and concern for small intramural abscess   - CXR clear, UA negative, pending BCX  - f/u GI PCR and C. diff   - c/w zosyn (5/24 - )  - BCx 5/23 grew 1 out of 2 bottles CoNS; repeat BCx 4/24 NGTD, likely contaminant, will not treat Admitted with severe sepsis (Tmax 101.2, leukocytosis, tachycardic to 116-123, 15.3% bands, lactate to 3.7); s/p total 1.5 L   - CT angio revealed distal terminal ileitis and concern for small intramural abscess   - CXR clear, UA negative, pending BCX  - GI PCR (-)tive  - f/u C. diff   - c/w zosyn (5/24 - )  - BCx 5/23 grew 1 out of 2 bottles CoNS; repeat BCx 4/24 NGTD, likely contaminant, will not treat

## 2022-05-28 NOTE — PROGRESS NOTE ADULT - PROBLEM SELECTOR PLAN 4
Per Pharmacy, patient takes prandin and amaryl at home   - hold prandin and amaryl during inpatient   - A1C 9.3  - c/w patient on lantus 5 and increase mealtime to 4U; titrate to 100-180 FS  - c/w DIGNA/FS qac, qhs  - carb consistent low residual diet  - to convert to liquids if GI determines patient indicated for scope

## 2022-05-28 NOTE — PROGRESS NOTE ADULT - PROBLEM SELECTOR PLAN 3
Appears to be a chronic issue; Endorses b/l shoulder and upper back pain that she has had for "long time"   -no temporality to it, worsens w/ movement, non tender to palpation   -CTA negative for aortic dissection   -differential includes MSK   -ESR elevated, but may be in the setting of infection  -tylenol PRN for symptom control

## 2022-05-28 NOTE — PROGRESS NOTE ADULT - ATTENDING COMMENTS
Patient is an 86 y.o F w/ PMH T2DM who presents w/ abdominal pain, found to have severe sepsis int he setting of terminal ileitis and possible intramural abscess as seen on CTA. Found to have RUQ pain of unclear etiology    #Terminal ileitis  -Initially admitted w/ severe sepsis likely 2/2 terminal ileitis as seen on imaging; UA and BCX NGTD  -CT angio revealed distal terminal ileitis and c/f small intramural abscess  -surgery consulted; no surgical intervention warranted as abscess too small for percutaneous drainage   -c/w zosyn for now  -Stool culture negative, however, obtained after patient had multiple days of abx b/c she could not provide sample  -GI consulted; patient w/ persistent abdominal pain, f/u CT abd/pelvis w/ IV contrast   -c/w low residual diet      #RUQ pain   -Patient continues to endorse RUQ pain, unclear etiology   -RUQ U/S revealed gall bladder sludge, some edema in gall bladder well in the setting of ascites   -Meckel scan (5/27) negative for ectopic tissue  -with persistent/worsening RUQ pain, will order CT abd/pelvis w/ IV contrast to assess terminal ileitis/abscess    #T2DM  -Hx of T2DM, home medicaitons include prandin and amaryl, had been on metformin but unclear why discontinued   -lss, monitor FS before meals and at bedtime  -carb consistent diet   -c/w lantus 5 U qhs and admelog 4 U TID premeal  -A1c 9.3%, will likely need to start insulin therapy and f/u outpatient endocrine     Dispo: pending clinical improvement, f/u CT abd/pelvis   PT recommend home w/ home PT.

## 2022-05-28 NOTE — PROGRESS NOTE ADULT - SUBJECTIVE AND OBJECTIVE BOX
PROGRESS NOTE:     CONTACT INFO:  Du Landin MD | PGY-1  Pager: [phone] [place], [id number] [facility]  Also on TEAMS  After 7pm page night float  On weekends after 1pm check resident assignment tab to see who is covering      Patient is a 86y old  Female who presents with a chief complaint of Terminal Ileitis (27 May 2022 16:17)      SUBJECTIVE / OVERNIGHT EVENTS:    - No acute events overnight.   - No fevers/chills.  - Patient seen and evaluated at bedside.  - Denies SOB at rest, chest pain, palpitations, abdominal pain, nausea/vomiting    ADDITIONAL REVIEW OF SYSTEMS:    MEDICATIONS  (STANDING):  dextrose 5%. 1000 milliLiter(s) (50 mL/Hr) IV Continuous <Continuous>  dextrose 5%. 1000 milliLiter(s) (100 mL/Hr) IV Continuous <Continuous>  dextrose 50% Injectable 25 Gram(s) IV Push once  dextrose 50% Injectable 12.5 Gram(s) IV Push once  dextrose 50% Injectable 25 Gram(s) IV Push once  enoxaparin Injectable 40 milliGRAM(s) SubCutaneous every 24 hours  glucagon  Injectable 1 milliGRAM(s) IntraMuscular once  insulin glargine Injectable (LANTUS) 5 Unit(s) SubCutaneous at bedtime  insulin lispro (ADMELOG) corrective regimen sliding scale   SubCutaneous three times a day before meals  insulin lispro (ADMELOG) corrective regimen sliding scale   SubCutaneous at bedtime  insulin lispro Injectable (ADMELOG) 4 Unit(s) SubCutaneous three times a day before meals  piperacillin/tazobactam IVPB.. 3.375 Gram(s) IV Intermittent every 8 hours    MEDICATIONS  (PRN):  acetaminophen     Tablet .. 650 milliGRAM(s) Oral every 6 hours PRN Temp greater or equal to 38C (100.4F), Mild Pain (1 - 3), Moderate Pain (4 - 6)  aluminum hydroxide/magnesium hydroxide/simethicone Suspension 30 milliLiter(s) Oral every 4 hours PRN Dyspepsia  dextrose Oral Gel 15 Gram(s) Oral once PRN Blood Glucose LESS THAN 70 milliGRAM(s)/deciliter  melatonin 3 milliGRAM(s) Oral at bedtime PRN Insomnia      CAPILLARY BLOOD GLUCOSE      POCT Blood Glucose.: 230 mg/dL (27 May 2022 21:33)  POCT Blood Glucose.: 299 mg/dL (27 May 2022 17:18)  POCT Blood Glucose.: 204 mg/dL (27 May 2022 12:12)  POCT Blood Glucose.: 213 mg/dL (27 May 2022 11:14)  POCT Blood Glucose.: 191 mg/dL (27 May 2022 08:10)    I&O's Summary    27 May 2022 07:01  -  28 May 2022 07:00  --------------------------------------------------------  IN: 200 mL / OUT: 1000 mL / NET: -800 mL        PHYSICAL EXAM:  Vital Signs Last 24 Hrs  T(C): 37.6 (28 May 2022 04:12), Max: 37.6 (28 May 2022 04:12)  T(F): 99.6 (28 May 2022 04:12), Max: 99.6 (28 May 2022 04:12)  HR: 87 (28 May 2022 04:12) (69 - 87)  BP: 146/61 (28 May 2022 04:12) (101/71 - 146/61)  BP(mean): --  RR: 18 (28 May 2022 04:12) (18 - 18)  SpO2: 97% (28 May 2022 04:12) (97% - 99%)    CONSTITUTIONAL: NAD, well-developed  RESPIRATORY: Normal respiratory effort; lungs are clear to auscultation bilaterally  CARDIOVASCULAR: Regular rate and rhythm, normal S1 and S2, no murmur/rub/gallop; No lower extremity edema; Peripheral pulses are 2+ bilaterally  ABDOMEN: Nontender to palpation, normoactive bowel sounds, no rebound/guarding; No hepatosplenomegaly  MUSCULOSKELETAL: no clubbing or cyanosis of digits; no joint swelling or tenderness to palpation  PSYCH: A+O to person, place, and time; affect appropriate    LABS:                        11.8   13.55 )-----------( 286      ( 28 May 2022 06:21 )             35.9     05-28    135  |  98  |  18  ----------------------------<  255<H>  3.9   |  25  |  0.96    Ca    8.5      28 May 2022 06:21  Phos  3.1     05-28  Mg     1.8     05-28    TPro  6.0  /  Alb  2.5<L>  /  TBili  0.4  /  DBili  x   /  AST  13  /  ALT  10  /  AlkPhos  92  05-28              GI PCR Panel, Stool (collected 26 May 2022 17:07)  Source: .Stool Feces  Final Report (26 May 2022 22:38):    GI PCR Results: NOT detected    *******Please Note:*******    GI panel PCR evaluates for:    Campylobacter, Plesiomonas shigelloides, Salmonella,    Vibrio, Yersinia enterocolitica, Enteroaggregative    Escherichia coli (EAEC), Enteropathogenic E.coli (EPEC),    Enterotoxigenic E. coli (ETEC) lt/st, Shiga-like    toxin-producing E. coli (STEC) stx1/stx2,    Shigella/ Enteroinvasive E. coli (EIEC), Cryptosporidium,    Cyclospora cayetanensis, Entamoeba histolytica,    Giardia lamblia, Adenovirus F 40/41, Astrovirus,    Norovirus GI/GII, Rotavirus A, Sapovirus    Culture - Stool (collected 26 May 2022 17:07)  Source: .Stool Feces  Preliminary Report (27 May 2022 16:20):    No enteric pathogens to date: Final culture pending        RADIOLOGY & ADDITIONAL TESTS:  Results Reviewed:   Imaging Personally Reviewed:  Electrocardiogram Personally Reviewed:    COORDINATION OF CARE:  Care Discussed with Consultants/Other Providers [Y/N]: Y  Prior or Outpatient Records Reviewed [Y/N]: Y   PROGRESS NOTE:     CONTACT INFO:  Du Landin MD | PGY-1  Pager: [phone] [place], [id number] [facility]  Also on TEAMS  After 7pm page night float  On weekends after 1pm check resident assignment tab to see who is covering      Patient is a 86y old  Female who presents with a chief complaint of Terminal Ileitis (27 May 2022 16:17)      SUBJECTIVE / OVERNIGHT EVENTS:    - No acute events overnight.   - Feels that her pain is improving   - No fevers/chills.  - Patient seen and evaluated at bedside.  - Denies SOB at rest, chest pain, palpitations, nausea/vomiting    ADDITIONAL REVIEW OF SYSTEMS:    MEDICATIONS  (STANDING):  dextrose 5%. 1000 milliLiter(s) (50 mL/Hr) IV Continuous <Continuous>  dextrose 5%. 1000 milliLiter(s) (100 mL/Hr) IV Continuous <Continuous>  dextrose 50% Injectable 25 Gram(s) IV Push once  dextrose 50% Injectable 12.5 Gram(s) IV Push once  dextrose 50% Injectable 25 Gram(s) IV Push once  enoxaparin Injectable 40 milliGRAM(s) SubCutaneous every 24 hours  glucagon  Injectable 1 milliGRAM(s) IntraMuscular once  insulin glargine Injectable (LANTUS) 5 Unit(s) SubCutaneous at bedtime  insulin lispro (ADMELOG) corrective regimen sliding scale   SubCutaneous three times a day before meals  insulin lispro (ADMELOG) corrective regimen sliding scale   SubCutaneous at bedtime  insulin lispro Injectable (ADMELOG) 4 Unit(s) SubCutaneous three times a day before meals  piperacillin/tazobactam IVPB.. 3.375 Gram(s) IV Intermittent every 8 hours    MEDICATIONS  (PRN):  acetaminophen     Tablet .. 650 milliGRAM(s) Oral every 6 hours PRN Temp greater or equal to 38C (100.4F), Mild Pain (1 - 3), Moderate Pain (4 - 6)  aluminum hydroxide/magnesium hydroxide/simethicone Suspension 30 milliLiter(s) Oral every 4 hours PRN Dyspepsia  dextrose Oral Gel 15 Gram(s) Oral once PRN Blood Glucose LESS THAN 70 milliGRAM(s)/deciliter  melatonin 3 milliGRAM(s) Oral at bedtime PRN Insomnia      CAPILLARY BLOOD GLUCOSE      POCT Blood Glucose.: 230 mg/dL (27 May 2022 21:33)  POCT Blood Glucose.: 299 mg/dL (27 May 2022 17:18)  POCT Blood Glucose.: 204 mg/dL (27 May 2022 12:12)  POCT Blood Glucose.: 213 mg/dL (27 May 2022 11:14)  POCT Blood Glucose.: 191 mg/dL (27 May 2022 08:10)    I&O's Summary    27 May 2022 07:01  -  28 May 2022 07:00  --------------------------------------------------------  IN: 200 mL / OUT: 1000 mL / NET: -800 mL        PHYSICAL EXAM:  Vital Signs Last 24 Hrs  T(C): 37.6 (28 May 2022 04:12), Max: 37.6 (28 May 2022 04:12)  T(F): 99.6 (28 May 2022 04:12), Max: 99.6 (28 May 2022 04:12)  HR: 87 (28 May 2022 04:12) (69 - 87)  BP: 146/61 (28 May 2022 04:12) (101/71 - 146/61)  BP(mean): --  RR: 18 (28 May 2022 04:12) (18 - 18)  SpO2: 97% (28 May 2022 04:12) (97% - 99%)    CONSTITUTIONAL: NAD, age-appropriate   RESPIRATORY: Normal respiratory effort; lungs are clear to auscultation bilaterally  CARDIOVASCULAR: Regular rate and rhythm, normal S1 and S2, no murmur/rub/gallop; No lower extremity edema; Peripheral pulses are 2+ bilaterally  ABDOMEN: Diffuse tenderness to palpation, normoactive bowel sounds, +rebound/guarding;   MUSCULOSKELETAL: no clubbing or cyanosis of digits; no joint swelling or tenderness to palpation  PSYCH: A+O to person, place, and time; affect appropriate    LABS:                        11.8   13.55 )-----------( 286      ( 28 May 2022 06:21 )             35.9     05-28    135  |  98  |  18  ----------------------------<  255<H>  3.9   |  25  |  0.96    Ca    8.5      28 May 2022 06:21  Phos  3.1     05-28  Mg     1.8     05-28    TPro  6.0  /  Alb  2.5<L>  /  TBili  0.4  /  DBili  x   /  AST  13  /  ALT  10  /  AlkPhos  92  05-28              GI PCR Panel, Stool (collected 26 May 2022 17:07)  Source: .Stool Feces  Final Report (26 May 2022 22:38):    GI PCR Results: NOT detected    *******Please Note:*******    GI panel PCR evaluates for:    Campylobacter, Plesiomonas shigelloides, Salmonella,    Vibrio, Yersinia enterocolitica, Enteroaggregative    Escherichia coli (EAEC), Enteropathogenic E.coli (EPEC),    Enterotoxigenic E. coli (ETEC) lt/st, Shiga-like    toxin-producing E. coli (STEC) stx1/stx2,    Shigella/ Enteroinvasive E. coli (EIEC), Cryptosporidium,    Cyclospora cayetanensis, Entamoeba histolytica,    Giardia lamblia, Adenovirus F 40/41, Astrovirus,    Norovirus GI/GII, Rotavirus A, Sapovirus    Culture - Stool (collected 26 May 2022 17:07)  Source: .Stool Feces  Preliminary Report (27 May 2022 16:20):    No enteric pathogens to date: Final culture pending        RADIOLOGY & ADDITIONAL TESTS:  Results Reviewed:   Imaging Personally Reviewed:  Electrocardiogram Personally Reviewed:    COORDINATION OF CARE:  Care Discussed with Consultants/Other Providers [Y/N]: Y  Prior or Outpatient Records Reviewed [Y/N]: Y

## 2022-05-28 NOTE — PROGRESS NOTE ADULT - PROBLEM SELECTOR PLAN 5
DVT prophylaxis: lovenox daily   Diet: fiber diet   Dispo: PT recommend Home PT   HTN: bps elevated to sbp 140s, likely undiagnosed HTN. Will continue to monitor for now at this time

## 2022-05-28 NOTE — PROGRESS NOTE ADULT - SUBJECTIVE AND OBJECTIVE BOX
Green Team Surgery    Overnight: No acute events overnight.    Subjective:   Patient seen at bedside this AM. Pt denies any new complaints; reports less abd pain.     Objective:  Vital Signs  T(C): 37.6 (05-28 @ 04:12), Max: 37.6 (05-28 @ 04:12)  HR: 87 (05-28 @ 04:12) (69 - 87)  BP: 146/61 (05-28 @ 04:12) (101/71 - 146/61)  RR: 18 (05-28 @ 04:12) (18 - 18)  SpO2: 97% (05-28 @ 04:12) (97% - 99%)  05-27-22 @ 07:01  -  05-28-22 @ 07:00  --------------------------------------------------------  IN:  Total IN: 0 mL    OUT:    Intermittent Catheterization - Urethral (mL): 700 mL    Voided (mL): 300 mL  Total OUT: 1000 mL    Total NET: -1000 mL        Physical Exam:  CONSTITUTIONAL: NAD  RESPIRATORY: Normal respiratory effort; Non labored breathing  ABDOMEN: Diffuse tenderness to palpation, no guarding or rebound tenderness  GENITOURINARY: With jackson   MUSCULOSKELETAL: no clubbing or cyanosis of digits; no joint swelling or tenderness to palpation    Labs:                        11.8   13.55 )-----------( 286      ( 28 May 2022 06:21 )             35.9   05-28    135  |  98  |  18  ----------------------------<  255<H>  3.9   |  25  |  0.96    Ca    8.5      28 May 2022 06:21  Phos  3.1     05-28  Mg     1.8     05-28    TPro  6.0  /  Alb  2.5<L>  /  TBili  0.4  /  DBili  x   /  AST  13  /  ALT  10  /  AlkPhos  92  05-28    CAPILLARY BLOOD GLUCOSE      POCT Blood Glucose.: 230 mg/dL (27 May 2022 21:33)  POCT Blood Glucose.: 299 mg/dL (27 May 2022 17:18)  POCT Blood Glucose.: 204 mg/dL (27 May 2022 12:12)  POCT Blood Glucose.: 213 mg/dL (27 May 2022 11:14)      Medications:   MEDICATIONS  (STANDING):  dextrose 5%. 1000 milliLiter(s) (50 mL/Hr) IV Continuous <Continuous>  dextrose 5%. 1000 milliLiter(s) (100 mL/Hr) IV Continuous <Continuous>  dextrose 50% Injectable 25 Gram(s) IV Push once  dextrose 50% Injectable 12.5 Gram(s) IV Push once  dextrose 50% Injectable 25 Gram(s) IV Push once  enoxaparin Injectable 40 milliGRAM(s) SubCutaneous every 24 hours  glucagon  Injectable 1 milliGRAM(s) IntraMuscular once  insulin glargine Injectable (LANTUS) 5 Unit(s) SubCutaneous at bedtime  insulin lispro (ADMELOG) corrective regimen sliding scale   SubCutaneous three times a day before meals  insulin lispro (ADMELOG) corrective regimen sliding scale   SubCutaneous at bedtime  insulin lispro Injectable (ADMELOG) 4 Unit(s) SubCutaneous three times a day before meals  piperacillin/tazobactam IVPB.. 3.375 Gram(s) IV Intermittent every 8 hours    MEDICATIONS  (PRN):  acetaminophen     Tablet .. 650 milliGRAM(s) Oral every 6 hours PRN Temp greater or equal to 38C (100.4F), Mild Pain (1 - 3), Moderate Pain (4 - 6)  aluminum hydroxide/magnesium hydroxide/simethicone Suspension 30 milliLiter(s) Oral every 4 hours PRN Dyspepsia  dextrose Oral Gel 15 Gram(s) Oral once PRN Blood Glucose LESS THAN 70 milliGRAM(s)/deciliter  melatonin 3 milliGRAM(s) Oral at bedtime PRN Insomnia      Imaging:

## 2022-05-28 NOTE — PROGRESS NOTE ADULT - ASSESSMENT
86F w/ PMH T2DM who presents w/ abdominal and shoulder pain, found to have severe sepsis int he setting of terminal ileitis and possible intramural abscess as seen on CTA. Meckel scan negative. Pending possible colonoscopy.

## 2022-05-28 NOTE — PROGRESS NOTE ADULT - PROBLEM SELECTOR PLAN 7
R/o meckel's diverticulum per surgery  - meckels scan scheduled 8Am 5/27  - Famotidine 50 x 1 ordered per surgery  - DC jackson and commence TOV

## 2022-05-28 NOTE — PROGRESS NOTE ADULT - ASSESSMENT
86F w/ PMH T2DM who presents w/ abdominal and shoulder pain, found to have severe sepsis int he setting of terminal ileitis and possible intramural abscess as seen on CTA. Meckel scan negative.    Plan:  -DVT ppx  -Continue ABX  -Rec CT scan over the weekend if WBC continue to climb  -Pain control  -OOBAT  -care as per primary team    Green [facility]  [id number]

## 2022-05-29 LAB
ALBUMIN SERPL ELPH-MCNC: 2.3 G/DL — LOW (ref 3.3–5)
ALP SERPL-CCNC: 87 U/L — SIGNIFICANT CHANGE UP (ref 40–120)
ALT FLD-CCNC: 11 U/L — SIGNIFICANT CHANGE UP (ref 10–45)
ANION GAP SERPL CALC-SCNC: 12 MMOL/L — SIGNIFICANT CHANGE UP (ref 5–17)
AST SERPL-CCNC: 18 U/L — SIGNIFICANT CHANGE UP (ref 10–40)
BILIRUB SERPL-MCNC: 0.4 MG/DL — SIGNIFICANT CHANGE UP (ref 0.2–1.2)
BUN SERPL-MCNC: 18 MG/DL — SIGNIFICANT CHANGE UP (ref 7–23)
CALCIUM SERPL-MCNC: 8.9 MG/DL — SIGNIFICANT CHANGE UP (ref 8.4–10.5)
CHLORIDE SERPL-SCNC: 98 MMOL/L — SIGNIFICANT CHANGE UP (ref 96–108)
CO2 SERPL-SCNC: 26 MMOL/L — SIGNIFICANT CHANGE UP (ref 22–31)
CREAT SERPL-MCNC: 0.94 MG/DL — SIGNIFICANT CHANGE UP (ref 0.5–1.3)
CULTURE RESULTS: SIGNIFICANT CHANGE UP
CULTURE RESULTS: SIGNIFICANT CHANGE UP
EGFR: 59 ML/MIN/1.73M2 — LOW
GLUCOSE BLDC GLUCOMTR-MCNC: 167 MG/DL — HIGH (ref 70–99)
GLUCOSE BLDC GLUCOMTR-MCNC: 169 MG/DL — HIGH (ref 70–99)
GLUCOSE BLDC GLUCOMTR-MCNC: 171 MG/DL — HIGH (ref 70–99)
GLUCOSE BLDC GLUCOMTR-MCNC: 189 MG/DL — HIGH (ref 70–99)
GLUCOSE BLDC GLUCOMTR-MCNC: 214 MG/DL — HIGH (ref 70–99)
GLUCOSE SERPL-MCNC: 155 MG/DL — HIGH (ref 70–99)
HCT VFR BLD CALC: 35.9 % — SIGNIFICANT CHANGE UP (ref 34.5–45)
HGB BLD-MCNC: 11.6 G/DL — SIGNIFICANT CHANGE UP (ref 11.5–15.5)
MAGNESIUM SERPL-MCNC: 2 MG/DL — SIGNIFICANT CHANGE UP (ref 1.6–2.6)
MCHC RBC-ENTMCNC: 30.9 PG — SIGNIFICANT CHANGE UP (ref 27–34)
MCHC RBC-ENTMCNC: 32.3 GM/DL — SIGNIFICANT CHANGE UP (ref 32–36)
MCV RBC AUTO: 95.5 FL — SIGNIFICANT CHANGE UP (ref 80–100)
NRBC # BLD: 0 /100 WBCS — SIGNIFICANT CHANGE UP (ref 0–0)
PHOSPHATE SERPL-MCNC: 3.6 MG/DL — SIGNIFICANT CHANGE UP (ref 2.5–4.5)
PLATELET # BLD AUTO: 302 K/UL — SIGNIFICANT CHANGE UP (ref 150–400)
POTASSIUM SERPL-MCNC: 3.9 MMOL/L — SIGNIFICANT CHANGE UP (ref 3.5–5.3)
POTASSIUM SERPL-SCNC: 3.9 MMOL/L — SIGNIFICANT CHANGE UP (ref 3.5–5.3)
PROT SERPL-MCNC: 5.6 G/DL — LOW (ref 6–8.3)
RBC # BLD: 3.76 M/UL — LOW (ref 3.8–5.2)
RBC # FLD: 12.4 % — SIGNIFICANT CHANGE UP (ref 10.3–14.5)
SODIUM SERPL-SCNC: 136 MMOL/L — SIGNIFICANT CHANGE UP (ref 135–145)
SPECIMEN SOURCE: SIGNIFICANT CHANGE UP
SPECIMEN SOURCE: SIGNIFICANT CHANGE UP
WBC # BLD: 13.62 K/UL — HIGH (ref 3.8–10.5)
WBC # FLD AUTO: 13.62 K/UL — HIGH (ref 3.8–10.5)

## 2022-05-29 PROCEDURE — 99232 SBSQ HOSP IP/OBS MODERATE 35: CPT | Mod: GC

## 2022-05-29 RX ORDER — HYDROCORTISONE 1 %
1 OINTMENT (GRAM) TOPICAL
Refills: 0 | Status: DISCONTINUED | OUTPATIENT
Start: 2022-05-29 | End: 2022-06-06

## 2022-05-29 RX ADMIN — Medication 4 UNIT(S): at 18:08

## 2022-05-29 RX ADMIN — Medication 1: at 09:10

## 2022-05-29 RX ADMIN — PIPERACILLIN AND TAZOBACTAM 25 GRAM(S): 4; .5 INJECTION, POWDER, LYOPHILIZED, FOR SOLUTION INTRAVENOUS at 21:19

## 2022-05-29 RX ADMIN — ENOXAPARIN SODIUM 40 MILLIGRAM(S): 100 INJECTION SUBCUTANEOUS at 18:08

## 2022-05-29 RX ADMIN — PIPERACILLIN AND TAZOBACTAM 25 GRAM(S): 4; .5 INJECTION, POWDER, LYOPHILIZED, FOR SOLUTION INTRAVENOUS at 13:41

## 2022-05-29 RX ADMIN — PIPERACILLIN AND TAZOBACTAM 25 GRAM(S): 4; .5 INJECTION, POWDER, LYOPHILIZED, FOR SOLUTION INTRAVENOUS at 05:46

## 2022-05-29 RX ADMIN — Medication 4 UNIT(S): at 09:09

## 2022-05-29 RX ADMIN — Medication 4 UNIT(S): at 13:41

## 2022-05-29 RX ADMIN — INSULIN GLARGINE 10 UNIT(S): 100 INJECTION, SOLUTION SUBCUTANEOUS at 22:28

## 2022-05-29 RX ADMIN — Medication 2: at 18:08

## 2022-05-29 RX ADMIN — Medication 1: at 13:40

## 2022-05-29 NOTE — PROGRESS NOTE ADULT - ASSESSMENT
86F w/ PMH T2DM who presents w/ abdominal and shoulder pain, found to have severe sepsis int he setting of terminal ileitis and possible intramural abscess as seen on CTA. Meckel scan negative.    Plan:  -DVT ppx  -Continue ABX  -Rec CT scan over the weekend if WBC continue to climb  -Pain control  -OOBAT  -care as per primary team    Green Surgery  [id number] 86F w/ PMH T2DM who presents w/ abdominal and shoulder pain, found to have severe sepsis int he setting of terminal ileitis and possible intramural abscess as seen on CTA. Meckel scan negative.    Plan:  -DVT ppx  -Continue ABX  -F/u CT scan read  -Pain control  -OOBAT  -care as per primary team    Green Surgery  [id number]

## 2022-05-29 NOTE — PROGRESS NOTE ADULT - SUBJECTIVE AND OBJECTIVE BOX
PROGRESS NOTE:     CONTACT INFO:  Du Landin MD | PGY-1  Pager: [phone] [place], [address] [facility]  Also on TEAMS  After 7pm page night float  On weekends after 1pm check resident assignment tab to see who is covering      Patient is a 86y old  Female who presents with a chief complaint of Terminal Ileitis (29 May 2022 04:09)      SUBJECTIVE / OVERNIGHT EVENTS:    - No acute events overnight.   - No fevers/chills.  - Patient seen and evaluated at bedside.  - Denies SOB at rest, chest pain, palpitations, abdominal pain, nausea/vomiting    ADDITIONAL REVIEW OF SYSTEMS:    MEDICATIONS  (STANDING):  dextrose 5%. 1000 milliLiter(s) (50 mL/Hr) IV Continuous <Continuous>  dextrose 5%. 1000 milliLiter(s) (100 mL/Hr) IV Continuous <Continuous>  dextrose 50% Injectable 25 Gram(s) IV Push once  dextrose 50% Injectable 12.5 Gram(s) IV Push once  dextrose 50% Injectable 25 Gram(s) IV Push once  enoxaparin Injectable 40 milliGRAM(s) SubCutaneous every 24 hours  glucagon  Injectable 1 milliGRAM(s) IntraMuscular once  hydrocortisone 1% Cream 1 Application(s) Topical two times a day  insulin glargine Injectable (LANTUS) 10 Unit(s) SubCutaneous at bedtime  insulin lispro (ADMELOG) corrective regimen sliding scale   SubCutaneous three times a day before meals  insulin lispro (ADMELOG) corrective regimen sliding scale   SubCutaneous at bedtime  insulin lispro Injectable (ADMELOG) 4 Unit(s) SubCutaneous three times a day before meals  piperacillin/tazobactam IVPB.. 3.375 Gram(s) IV Intermittent every 8 hours    MEDICATIONS  (PRN):  acetaminophen     Tablet .. 650 milliGRAM(s) Oral every 6 hours PRN Temp greater or equal to 38C (100.4F), Mild Pain (1 - 3), Moderate Pain (4 - 6)  aluminum hydroxide/magnesium hydroxide/simethicone Suspension 30 milliLiter(s) Oral every 4 hours PRN Dyspepsia  dextrose Oral Gel 15 Gram(s) Oral once PRN Blood Glucose LESS THAN 70 milliGRAM(s)/deciliter  melatonin 3 milliGRAM(s) Oral at bedtime PRN Insomnia      CAPILLARY BLOOD GLUCOSE      POCT Blood Glucose.: 174 mg/dL (28 May 2022 22:06)  POCT Blood Glucose.: 230 mg/dL (28 May 2022 17:27)  POCT Blood Glucose.: 260 mg/dL (28 May 2022 13:34)  POCT Blood Glucose.: 286 mg/dL (28 May 2022 09:53)    I&O's Summary    28 May 2022 07:01  -  29 May 2022 07:00  --------------------------------------------------------  IN: 440 mL / OUT: 0 mL / NET: 440 mL        PHYSICAL EXAM:  Vital Signs Last 24 Hrs  T(C): 37.2 (29 May 2022 05:45), Max: 37.6 (28 May 2022 21:39)  T(F): 98.9 (29 May 2022 05:45), Max: 99.7 (28 May 2022 21:39)  HR: 67 (29 May 2022 05:45) (67 - 95)  BP: 131/61 (29 May 2022 05:45) (131/61 - 147/72)  BP(mean): --  RR: 18 (29 May 2022 05:45) (18 - 18)  SpO2: 96% (29 May 2022 05:45) (96% - 98%)    CONSTITUTIONAL: NAD, well-developed  RESPIRATORY: Normal respiratory effort; lungs are clear to auscultation bilaterally  CARDIOVASCULAR: Regular rate and rhythm, normal S1 and S2, no murmur/rub/gallop; No lower extremity edema; Peripheral pulses are 2+ bilaterally  ABDOMEN: Nontender to palpation, normoactive bowel sounds, no rebound/guarding; No hepatosplenomegaly  MUSCULOSKELETAL: no clubbing or cyanosis of digits; no joint swelling or tenderness to palpation  PSYCH: A+O to person, place, and time; affect appropriate    LABS:                        11.8   13.55 )-----------( 286      ( 28 May 2022 06:21 )             35.9     05-28    135  |  98  |  18  ----------------------------<  255<H>  3.9   |  25  |  0.96    Ca    8.5      28 May 2022 06:21  Phos  3.1     05-28  Mg     1.8     05-28    TPro  6.0  /  Alb  2.5<L>  /  TBili  0.4  /  DBili  x   /  AST  13  /  ALT  10  /  AlkPhos  92  05-28              GI PCR Panel, Stool (collected 26 May 2022 17:07)  Source: .Stool Feces  Final Report (26 May 2022 22:38):    GI PCR Results: NOT detected    *******Please Note:*******    GI panel PCR evaluates for:    Campylobacter, Plesiomonas shigelloides, Salmonella,    Vibrio, Yersinia enterocolitica, Enteroaggregative    Escherichia coli (EAEC), Enteropathogenic E.coli (EPEC),    Enterotoxigenic E. coli (ETEC) lt/st, Shiga-like    toxin-producing E. coli (STEC) stx1/stx2,    Shigella/ Enteroinvasive E. coli (EIEC), Cryptosporidium,    Cyclospora cayetanensis, Entamoeba histolytica,    Giardia lamblia, Adenovirus F 40/41, Astrovirus,    Norovirus GI/GII, Rotavirus A, Sapovirus    Culture - Stool (collected 26 May 2022 17:07)  Source: .Stool Feces  Preliminary Report (27 May 2022 16:20):    No enteric pathogens to date: Final culture pending        RADIOLOGY & ADDITIONAL TESTS:  Results Reviewed:   Imaging Personally Reviewed:  Electrocardiogram Personally Reviewed:    COORDINATION OF CARE:  Care Discussed with Consultants/Other Providers [Y/N]: Y  Prior or Outpatient Records Reviewed [Y/N]: Y   PROGRESS NOTE:     CONTACT INFO:  Du Landin MD | PGY-1  Pager: [phone] [place], [address] [facility]  Also on TEAMS  After 7pm page night float  On weekends after 1pm check resident assignment tab to see who is covering      Patient is a 86y old  Female who presents with a chief complaint of Terminal Ileitis (29 May 2022 04:09)      SUBJECTIVE / OVERNIGHT EVENTS:    - No acute events overnight.   - Abdominal pain continues to lessen   - No fevers/chills.  - Patient seen and evaluated at bedside.  - Denies SOB at rest, chest pain, palpitations, nausea/vomiting    ADDITIONAL REVIEW OF SYSTEMS:    MEDICATIONS  (STANDING):  dextrose 5%. 1000 milliLiter(s) (50 mL/Hr) IV Continuous <Continuous>  dextrose 5%. 1000 milliLiter(s) (100 mL/Hr) IV Continuous <Continuous>  dextrose 50% Injectable 25 Gram(s) IV Push once  dextrose 50% Injectable 12.5 Gram(s) IV Push once  dextrose 50% Injectable 25 Gram(s) IV Push once  enoxaparin Injectable 40 milliGRAM(s) SubCutaneous every 24 hours  glucagon  Injectable 1 milliGRAM(s) IntraMuscular once  hydrocortisone 1% Cream 1 Application(s) Topical two times a day  insulin glargine Injectable (LANTUS) 10 Unit(s) SubCutaneous at bedtime  insulin lispro (ADMELOG) corrective regimen sliding scale   SubCutaneous three times a day before meals  insulin lispro (ADMELOG) corrective regimen sliding scale   SubCutaneous at bedtime  insulin lispro Injectable (ADMELOG) 4 Unit(s) SubCutaneous three times a day before meals  piperacillin/tazobactam IVPB.. 3.375 Gram(s) IV Intermittent every 8 hours    MEDICATIONS  (PRN):  acetaminophen     Tablet .. 650 milliGRAM(s) Oral every 6 hours PRN Temp greater or equal to 38C (100.4F), Mild Pain (1 - 3), Moderate Pain (4 - 6)  aluminum hydroxide/magnesium hydroxide/simethicone Suspension 30 milliLiter(s) Oral every 4 hours PRN Dyspepsia  dextrose Oral Gel 15 Gram(s) Oral once PRN Blood Glucose LESS THAN 70 milliGRAM(s)/deciliter  melatonin 3 milliGRAM(s) Oral at bedtime PRN Insomnia      CAPILLARY BLOOD GLUCOSE      POCT Blood Glucose.: 174 mg/dL (28 May 2022 22:06)  POCT Blood Glucose.: 230 mg/dL (28 May 2022 17:27)  POCT Blood Glucose.: 260 mg/dL (28 May 2022 13:34)  POCT Blood Glucose.: 286 mg/dL (28 May 2022 09:53)    I&O's Summary    28 May 2022 07:01  -  29 May 2022 07:00  --------------------------------------------------------  IN: 440 mL / OUT: 0 mL / NET: 440 mL        PHYSICAL EXAM:  Vital Signs Last 24 Hrs  T(C): 37.2 (29 May 2022 05:45), Max: 37.6 (28 May 2022 21:39)  T(F): 98.9 (29 May 2022 05:45), Max: 99.7 (28 May 2022 21:39)  HR: 67 (29 May 2022 05:45) (67 - 95)  BP: 131/61 (29 May 2022 05:45) (131/61 - 147/72)  BP(mean): --  RR: 18 (29 May 2022 05:45) (18 - 18)  SpO2: 96% (29 May 2022 05:45) (96% - 98%)    CONSTITUTIONAL: NAD, age-appropriate   RESPIRATORY: Normal respiratory effort; lungs are clear to auscultation bilaterally  CARDIOVASCULAR: Regular rate and rhythm, normal S1 and S2, no murmur/rub/gallop; No lower extremity edema; Peripheral pulses are 2+ bilaterally  ABDOMEN: Diffuse tenderness to palpation, normoactive bowel sounds, +rebound/guarding;   MUSCULOSKELETAL: no clubbing or cyanosis of digits; no joint swelling or tenderness to palpation  PSYCH: A+O to person, place, and time; affect appropriate      LABS:                        11.8   13.55 )-----------( 286      ( 28 May 2022 06:21 )             35.9     05-28    135  |  98  |  18  ----------------------------<  255<H>  3.9   |  25  |  0.96    Ca    8.5      28 May 2022 06:21  Phos  3.1     05-28  Mg     1.8     05-28    TPro  6.0  /  Alb  2.5<L>  /  TBili  0.4  /  DBili  x   /  AST  13  /  ALT  10  /  AlkPhos  92  05-28              GI PCR Panel, Stool (collected 26 May 2022 17:07)  Source: .Stool Feces  Final Report (26 May 2022 22:38):    GI PCR Results: NOT detected    *******Please Note:*******    GI panel PCR evaluates for:    Campylobacter, Plesiomonas shigelloides, Salmonella,    Vibrio, Yersinia enterocolitica, Enteroaggregative    Escherichia coli (EAEC), Enteropathogenic E.coli (EPEC),    Enterotoxigenic E. coli (ETEC) lt/st, Shiga-like    toxin-producing E. coli (STEC) stx1/stx2,    Shigella/ Enteroinvasive E. coli (EIEC), Cryptosporidium,    Cyclospora cayetanensis, Entamoeba histolytica,    Giardia lamblia, Adenovirus F 40/41, Astrovirus,    Norovirus GI/GII, Rotavirus A, Sapovirus    Culture - Stool (collected 26 May 2022 17:07)  Source: .Stool Feces  Preliminary Report (27 May 2022 16:20):    No enteric pathogens to date: Final culture pending        RADIOLOGY & ADDITIONAL TESTS:  Results Reviewed:   Imaging Personally Reviewed:  Electrocardiogram Personally Reviewed:    COORDINATION OF CARE:  Care Discussed with Consultants/Other Providers [Y/N]: Y  Prior or Outpatient Records Reviewed [Y/N]: Y   PROGRESS NOTE:     CONTACT INFO:  Du Landin MD | PGY-1  Pager: [phone] [place], [address] [facility]  Also on TEAMS  After 7pm page night float  On weekends after 1pm check resident assignment tab to see who is covering      Patient is a 86y old  Female who presents with a chief complaint of Terminal Ileitis (29 May 2022 04:09)      SUBJECTIVE / OVERNIGHT EVENTS:    - No acute events overnight.   - Abdominal pain continues to lessen   - No fevers/chills.  - Patient seen and evaluated at bedside.  - Denies SOB at rest, chest pain, palpitations, nausea/vomiting    ADDITIONAL REVIEW OF SYSTEMS:    MEDICATIONS  (STANDING):  dextrose 5%. 1000 milliLiter(s) (50 mL/Hr) IV Continuous <Continuous>  dextrose 5%. 1000 milliLiter(s) (100 mL/Hr) IV Continuous <Continuous>  dextrose 50% Injectable 25 Gram(s) IV Push once  dextrose 50% Injectable 12.5 Gram(s) IV Push once  dextrose 50% Injectable 25 Gram(s) IV Push once  enoxaparin Injectable 40 milliGRAM(s) SubCutaneous every 24 hours  glucagon  Injectable 1 milliGRAM(s) IntraMuscular once  hydrocortisone 1% Cream 1 Application(s) Topical two times a day  insulin glargine Injectable (LANTUS) 10 Unit(s) SubCutaneous at bedtime  insulin lispro (ADMELOG) corrective regimen sliding scale   SubCutaneous three times a day before meals  insulin lispro (ADMELOG) corrective regimen sliding scale   SubCutaneous at bedtime  insulin lispro Injectable (ADMELOG) 4 Unit(s) SubCutaneous three times a day before meals  piperacillin/tazobactam IVPB.. 3.375 Gram(s) IV Intermittent every 8 hours    MEDICATIONS  (PRN):  acetaminophen     Tablet .. 650 milliGRAM(s) Oral every 6 hours PRN Temp greater or equal to 38C (100.4F), Mild Pain (1 - 3), Moderate Pain (4 - 6)  aluminum hydroxide/magnesium hydroxide/simethicone Suspension 30 milliLiter(s) Oral every 4 hours PRN Dyspepsia  dextrose Oral Gel 15 Gram(s) Oral once PRN Blood Glucose LESS THAN 70 milliGRAM(s)/deciliter  melatonin 3 milliGRAM(s) Oral at bedtime PRN Insomnia      CAPILLARY BLOOD GLUCOSE      POCT Blood Glucose.: 174 mg/dL (28 May 2022 22:06)  POCT Blood Glucose.: 230 mg/dL (28 May 2022 17:27)  POCT Blood Glucose.: 260 mg/dL (28 May 2022 13:34)  POCT Blood Glucose.: 286 mg/dL (28 May 2022 09:53)    I&O's Summary    28 May 2022 07:01  -  29 May 2022 07:00  --------------------------------------------------------  IN: 440 mL / OUT: 0 mL / NET: 440 mL        PHYSICAL EXAM:  Vital Signs Last 24 Hrs  T(C): 37.2 (29 May 2022 05:45), Max: 37.6 (28 May 2022 21:39)  T(F): 98.9 (29 May 2022 05:45), Max: 99.7 (28 May 2022 21:39)  HR: 67 (29 May 2022 05:45) (67 - 95)  BP: 131/61 (29 May 2022 05:45) (131/61 - 147/72)  BP(mean): --  RR: 18 (29 May 2022 05:45) (18 - 18)  SpO2: 96% (29 May 2022 05:45) (96% - 98%)    CONSTITUTIONAL: NAD, age-appropriate   RESPIRATORY: Normal respiratory effort; lungs are clear to auscultation bilaterally  CARDIOVASCULAR: Regular rate and rhythm, normal S1 and S2, no murmur/rub/gallop; No lower extremity edema; Peripheral pulses are 2+ bilaterally  ABDOMEN: Diffuse tenderness to palpation, particularly in RUQ , normoactive bowel sounds, +rebound/guarding;   MUSCULOSKELETAL: no clubbing or cyanosis of digits; no joint swelling or tenderness to palpation  PSYCH: A+O to person, place, and time; affect appropriate      LABS:                        11.8   13.55 )-----------( 286      ( 28 May 2022 06:21 )             35.9     05-28    135  |  98  |  18  ----------------------------<  255<H>  3.9   |  25  |  0.96    Ca    8.5      28 May 2022 06:21  Phos  3.1     05-28  Mg     1.8     05-28    TPro  6.0  /  Alb  2.5<L>  /  TBili  0.4  /  DBili  x   /  AST  13  /  ALT  10  /  AlkPhos  92  05-28              GI PCR Panel, Stool (collected 26 May 2022 17:07)  Source: .Stool Feces  Final Report (26 May 2022 22:38):    GI PCR Results: NOT detected    *******Please Note:*******    GI panel PCR evaluates for:    Campylobacter, Plesiomonas shigelloides, Salmonella,    Vibrio, Yersinia enterocolitica, Enteroaggregative    Escherichia coli (EAEC), Enteropathogenic E.coli (EPEC),    Enterotoxigenic E. coli (ETEC) lt/st, Shiga-like    toxin-producing E. coli (STEC) stx1/stx2,    Shigella/ Enteroinvasive E. coli (EIEC), Cryptosporidium,    Cyclospora cayetanensis, Entamoeba histolytica,    Giardia lamblia, Adenovirus F 40/41, Astrovirus,    Norovirus GI/GII, Rotavirus A, Sapovirus    Culture - Stool (collected 26 May 2022 17:07)  Source: .Stool Feces  Preliminary Report (27 May 2022 16:20):    No enteric pathogens to date: Final culture pending        RADIOLOGY & ADDITIONAL TESTS:  Results Reviewed:   Imaging Personally Reviewed:  Electrocardiogram Personally Reviewed:    COORDINATION OF CARE:  Care Discussed with Consultants/Other Providers [Y/N]: Y  Prior or Outpatient Records Reviewed [Y/N]: Y

## 2022-05-29 NOTE — PROGRESS NOTE ADULT - PROBLEM SELECTOR PLAN 2
-CT angio revealed distal terminal ileitis and c/f small intramural abscess  -surgery consulted; no surgical intervention warranted as abcess too small for percutaneous drainage   -GI consulted; f/u infectious work up. May consider colonoscopy if infectious work up negative  -c/w low residual diet  -see plan as above -CT angio revealed distal terminal ileitis and c/f small intramural abscess  -surgery consulted; no surgical intervention warranted as abcess too small for percutaneous drainage   -GI consulted; f/u infectious work up. May consider colonoscopy if infectious work up negative  -c/w low residual diet  - CT abdomen scan demonstrate Worsening distal ileitis. No intramural abscess. Small loculated ascites. Surgery informed   - see plan as above

## 2022-05-29 NOTE — PROGRESS NOTE ADULT - ATTENDING COMMENTS
Patient seen/examined.  Agree w above note and plan and have discussed plan w house staff.  Comfortable, abdomen mildly tender RLQ.  Abx    Baldo Dunaway MD

## 2022-05-29 NOTE — PROGRESS NOTE ADULT - PROBLEM SELECTOR PLAN 1
Admitted with severe sepsis (Tmax 101.2, leukocytosis, tachycardic to 116-123, 15.3% bands, lactate to 3.7); s/p total 1.5 L   - CT angio revealed distal terminal ileitis and concern for small intramural abscess   - CXR clear, UA negative, pending BCX  - GI PCR (-)tive  - f/u C. diff   - c/w zosyn (5/24 - )  - BCx 5/23 grew 1 out of 2 bottles CoNS; repeat BCx 4/24 NGTD, likely contaminant, will not treat

## 2022-05-29 NOTE — PROGRESS NOTE ADULT - ATTENDING COMMENTS
I agree with the above with the following additions and exceptions:    No ON events. reports abdominal pain improved, no n/v, f/c, last BM yesterday. Reports RUE swelling, on exam with swelling, no redness or tenderness at previous infilrated IV site    #Terminal ileitis  -minimal tenderness in RLQ  -Initially admitted w/ severe sepsis likely 2/2 terminal ileitis  UA and BCX NGTD  -initial CT angio revealed distal terminal ileitis and c/f small intramural abscess  -repeat CT 5/28 personally reviewed: Worsening distal ileitis. No intramural abscess. Small loculated ascites.  -meckel scan negative  -surgery following: no surgical intervention  -c/w zosyn  -wbc stable at 13      RUE swelling: due to infiltrated IV, no sign of celluitis  -elevated RUE  -if swelling doesn't improve in 1-2 days consider duplex of RUE    #T2DM: FS stable  -c/w lantus 10 U qhs and admelog 4 U TID premeal  -A1c 9.3%, will likely need to start insulin therapy and f/u outpatient endocrine     Dispo: pending clinical improvement,   PT recommend home w/ home PT.  discussed with family bedside

## 2022-05-29 NOTE — PROGRESS NOTE ADULT - PROBLEM SELECTOR PLAN 7
R/o meckel's diverticulum per surgery  - meckels scan scheduled 8Am 5/27  - Famotidine 50 x 1 ordered per surgery  - DC jackson and commence TOV R/o meckel's diverticulum per surgery  - meckels scan (-)tive   - Famotidine 50 x 1 ordered per surgery  - DC jackson and commence TOV

## 2022-05-29 NOTE — PROGRESS NOTE ADULT - SUBJECTIVE AND OBJECTIVE BOX
Green Team Surgery    Overnight: No acute events overnight.    Subjective:   Patient seen at bedside this AM. Pt denies any new complaints; reports less abd pain.     Objective:  Vital Signs Last 24 Hrs  T(C): 37.6 (28 May 2022 21:39), Max: 37.6 (28 May 2022 04:12)  T(F): 99.7 (28 May 2022 21:39), Max: 99.7 (28 May 2022 21:39)  HR: 85 (28 May 2022 21:39) (85 - 95)  BP: 142/81 (28 May 2022 21:39) (142/81 - 147/72)  BP(mean): --  RR: 18 (28 May 2022 21:39) (18 - 18)  SpO2: 98% (28 May 2022 21:39) (97% - 98%)    I&O's Detail    27 May 2022 07:01  -  28 May 2022 07:00  --------------------------------------------------------  IN:    IV PiggyBack: 200 mL  Total IN: 200 mL    OUT:    Intermittent Catheterization - Urethral (mL): 700 mL    Voided (mL): 300 mL  Total OUT: 1000 mL    Total NET: -800 mL      28 May 2022 07:01  -  29 May 2022 04:10  --------------------------------------------------------  IN:    IV PiggyBack: 200 mL    Oral Fluid: 240 mL  Total IN: 440 mL    OUT:  Total OUT: 0 mL    Total NET: 440 mL      Physical Exam:  CONSTITUTIONAL: NAD  RESPIRATORY: Normal respiratory effort; Non labored breathing  ABDOMEN: Diffuse tenderness to palpation, no guarding or rebound tenderness  GENITOURINARY: With jackson   MUSCULOSKELETAL: no clubbing or cyanosis of digits; no joint swelling or tenderness to palpation    Labs:                        11.8   13.55 )-----------( 286      ( 28 May 2022 06:21 )             35.9   05-28    135  |  98  |  18  ----------------------------<  255<H>  3.9   |  25  |  0.96    Ca    8.5      28 May 2022 06:21  Phos  3.1     05-28  Mg     1.8     05-28    TPro  6.0  /  Alb  2.5<L>  /  TBili  0.4  /  DBili  x   /  AST  13  /  ALT  10  /  AlkPhos  92  05-28    CAPILLARY BLOOD GLUCOSE      POCT Blood Glucose.: 230 mg/dL (27 May 2022 21:33)  POCT Blood Glucose.: 299 mg/dL (27 May 2022 17:18)  POCT Blood Glucose.: 204 mg/dL (27 May 2022 12:12)  POCT Blood Glucose.: 213 mg/dL (27 May 2022 11:14)      Medications:   MEDICATIONS  (STANDING):  dextrose 5%. 1000 milliLiter(s) (50 mL/Hr) IV Continuous <Continuous>  dextrose 5%. 1000 milliLiter(s) (100 mL/Hr) IV Continuous <Continuous>  dextrose 50% Injectable 25 Gram(s) IV Push once  dextrose 50% Injectable 12.5 Gram(s) IV Push once  dextrose 50% Injectable 25 Gram(s) IV Push once  enoxaparin Injectable 40 milliGRAM(s) SubCutaneous every 24 hours  glucagon  Injectable 1 milliGRAM(s) IntraMuscular once  insulin glargine Injectable (LANTUS) 5 Unit(s) SubCutaneous at bedtime  insulin lispro (ADMELOG) corrective regimen sliding scale   SubCutaneous three times a day before meals  insulin lispro (ADMELOG) corrective regimen sliding scale   SubCutaneous at bedtime  insulin lispro Injectable (ADMELOG) 4 Unit(s) SubCutaneous three times a day before meals  piperacillin/tazobactam IVPB.. 3.375 Gram(s) IV Intermittent every 8 hours    MEDICATIONS  (PRN):  acetaminophen     Tablet .. 650 milliGRAM(s) Oral every 6 hours PRN Temp greater or equal to 38C (100.4F), Mild Pain (1 - 3), Moderate Pain (4 - 6)  aluminum hydroxide/magnesium hydroxide/simethicone Suspension 30 milliLiter(s) Oral every 4 hours PRN Dyspepsia  dextrose Oral Gel 15 Gram(s) Oral once PRN Blood Glucose LESS THAN 70 milliGRAM(s)/deciliter  melatonin 3 milliGRAM(s) Oral at bedtime PRN Insomnia      Imaging:     Green Team Surgery    Overnight: No acute events overnight.    Subjective:   Patient seen at bedside this AM. Pt denies any new complaints; reports less abd pain.     Objective:  Vital Signs Last 24 Hrs  T(C): 37.6 (28 May 2022 21:39), Max: 37.6 (28 May 2022 04:12)  T(F): 99.7 (28 May 2022 21:39), Max: 99.7 (28 May 2022 21:39)  HR: 85 (28 May 2022 21:39) (85 - 95)  BP: 142/81 (28 May 2022 21:39) (142/81 - 147/72)  BP(mean): --  RR: 18 (28 May 2022 21:39) (18 - 18)  SpO2: 98% (28 May 2022 21:39) (97% - 98%)    I&O's Detail    27 May 2022 07:01  -  28 May 2022 07:00  --------------------------------------------------------  IN:    IV PiggyBack: 200 mL  Total IN: 200 mL    OUT:    Intermittent Catheterization - Urethral (mL): 700 mL    Voided (mL): 300 mL  Total OUT: 1000 mL    Total NET: -800 mL      28 May 2022 07:01  -  29 May 2022 04:10  --------------------------------------------------------  IN:    IV PiggyBack: 200 mL    Oral Fluid: 240 mL  Total IN: 440 mL    OUT:  Total OUT: 0 mL    Total NET: 440 mL      Physical Exam:  CONSTITUTIONAL: NAD  RESPIRATORY: Normal respiratory effort; Non labored breathing  ABDOMEN: Diffuse tenderness to palpation, no guarding or rebound tenderness  GENITOURINARY: With primafit   MUSCULOSKELETAL: no clubbing or cyanosis of digits; no joint swelling or tenderness to palpation    Labs:                        11.8   13.55 )-----------( 286      ( 28 May 2022 06:21 )             35.9   05-28    135  |  98  |  18  ----------------------------<  255<H>  3.9   |  25  |  0.96    Ca    8.5      28 May 2022 06:21  Phos  3.1     05-28  Mg     1.8     05-28    TPro  6.0  /  Alb  2.5<L>  /  TBili  0.4  /  DBili  x   /  AST  13  /  ALT  10  /  AlkPhos  92  05-28    CAPILLARY BLOOD GLUCOSE      POCT Blood Glucose.: 230 mg/dL (27 May 2022 21:33)  POCT Blood Glucose.: 299 mg/dL (27 May 2022 17:18)  POCT Blood Glucose.: 204 mg/dL (27 May 2022 12:12)  POCT Blood Glucose.: 213 mg/dL (27 May 2022 11:14)      Medications:   MEDICATIONS  (STANDING):  dextrose 5%. 1000 milliLiter(s) (50 mL/Hr) IV Continuous <Continuous>  dextrose 5%. 1000 milliLiter(s) (100 mL/Hr) IV Continuous <Continuous>  dextrose 50% Injectable 25 Gram(s) IV Push once  dextrose 50% Injectable 12.5 Gram(s) IV Push once  dextrose 50% Injectable 25 Gram(s) IV Push once  enoxaparin Injectable 40 milliGRAM(s) SubCutaneous every 24 hours  glucagon  Injectable 1 milliGRAM(s) IntraMuscular once  insulin glargine Injectable (LANTUS) 5 Unit(s) SubCutaneous at bedtime  insulin lispro (ADMELOG) corrective regimen sliding scale   SubCutaneous three times a day before meals  insulin lispro (ADMELOG) corrective regimen sliding scale   SubCutaneous at bedtime  insulin lispro Injectable (ADMELOG) 4 Unit(s) SubCutaneous three times a day before meals  piperacillin/tazobactam IVPB.. 3.375 Gram(s) IV Intermittent every 8 hours    MEDICATIONS  (PRN):  acetaminophen     Tablet .. 650 milliGRAM(s) Oral every 6 hours PRN Temp greater or equal to 38C (100.4F), Mild Pain (1 - 3), Moderate Pain (4 - 6)  aluminum hydroxide/magnesium hydroxide/simethicone Suspension 30 milliLiter(s) Oral every 4 hours PRN Dyspepsia  dextrose Oral Gel 15 Gram(s) Oral once PRN Blood Glucose LESS THAN 70 milliGRAM(s)/deciliter  melatonin 3 milliGRAM(s) Oral at bedtime PRN Insomnia      Imaging:

## 2022-05-29 NOTE — PROGRESS NOTE ADULT - ASSESSMENT
86F w/ PMH T2DM who presents w/ abdominal and shoulder pain, found to have severe sepsis int he setting of terminal ileitis and possible intramural abscess as seen on CTA. Meckel scan negative. Pending possible colonoscopy.  86F w/ PMH T2DM who presents w/ abdominal and shoulder pain, found to have severe sepsis int he setting of terminal ileitis and possible intramural abscess as seen on CTA. Meckel scan negative. S/p CT scan, to consult surgery as to further management. Pending possible colonoscopy.

## 2022-05-30 LAB
ALBUMIN SERPL ELPH-MCNC: 2.5 G/DL — LOW (ref 3.3–5)
ALP SERPL-CCNC: 94 U/L — SIGNIFICANT CHANGE UP (ref 40–120)
ALT FLD-CCNC: 11 U/L — SIGNIFICANT CHANGE UP (ref 10–45)
ANION GAP SERPL CALC-SCNC: 11 MMOL/L — SIGNIFICANT CHANGE UP (ref 5–17)
AST SERPL-CCNC: 18 U/L — SIGNIFICANT CHANGE UP (ref 10–40)
BILIRUB SERPL-MCNC: 0.4 MG/DL — SIGNIFICANT CHANGE UP (ref 0.2–1.2)
BUN SERPL-MCNC: 16 MG/DL — SIGNIFICANT CHANGE UP (ref 7–23)
CALCIUM SERPL-MCNC: 8.7 MG/DL — SIGNIFICANT CHANGE UP (ref 8.4–10.5)
CHLORIDE SERPL-SCNC: 98 MMOL/L — SIGNIFICANT CHANGE UP (ref 96–108)
CO2 SERPL-SCNC: 27 MMOL/L — SIGNIFICANT CHANGE UP (ref 22–31)
CREAT SERPL-MCNC: 0.92 MG/DL — SIGNIFICANT CHANGE UP (ref 0.5–1.3)
CULTURE RESULTS: SIGNIFICANT CHANGE UP
CULTURE RESULTS: SIGNIFICANT CHANGE UP
EGFR: 61 ML/MIN/1.73M2 — SIGNIFICANT CHANGE UP
GLUCOSE BLDC GLUCOMTR-MCNC: 101 MG/DL — HIGH (ref 70–99)
GLUCOSE BLDC GLUCOMTR-MCNC: 140 MG/DL — HIGH (ref 70–99)
GLUCOSE BLDC GLUCOMTR-MCNC: 148 MG/DL — HIGH (ref 70–99)
GLUCOSE BLDC GLUCOMTR-MCNC: 150 MG/DL — HIGH (ref 70–99)
GLUCOSE SERPL-MCNC: 130 MG/DL — HIGH (ref 70–99)
HCT VFR BLD CALC: 35.3 % — SIGNIFICANT CHANGE UP (ref 34.5–45)
HGB BLD-MCNC: 11.8 G/DL — SIGNIFICANT CHANGE UP (ref 11.5–15.5)
MAGNESIUM SERPL-MCNC: 2 MG/DL — SIGNIFICANT CHANGE UP (ref 1.6–2.6)
MCHC RBC-ENTMCNC: 31.6 PG — SIGNIFICANT CHANGE UP (ref 27–34)
MCHC RBC-ENTMCNC: 33.4 GM/DL — SIGNIFICANT CHANGE UP (ref 32–36)
MCV RBC AUTO: 94.4 FL — SIGNIFICANT CHANGE UP (ref 80–100)
NRBC # BLD: 0 /100 WBCS — SIGNIFICANT CHANGE UP (ref 0–0)
PHOSPHATE SERPL-MCNC: 3.7 MG/DL — SIGNIFICANT CHANGE UP (ref 2.5–4.5)
PLATELET # BLD AUTO: 369 K/UL — SIGNIFICANT CHANGE UP (ref 150–400)
POTASSIUM SERPL-MCNC: 3.9 MMOL/L — SIGNIFICANT CHANGE UP (ref 3.5–5.3)
POTASSIUM SERPL-SCNC: 3.9 MMOL/L — SIGNIFICANT CHANGE UP (ref 3.5–5.3)
PROT SERPL-MCNC: 5.3 G/DL — LOW (ref 6–8.3)
RBC # BLD: 3.74 M/UL — LOW (ref 3.8–5.2)
RBC # FLD: 12.4 % — SIGNIFICANT CHANGE UP (ref 10.3–14.5)
SODIUM SERPL-SCNC: 136 MMOL/L — SIGNIFICANT CHANGE UP (ref 135–145)
SPECIMEN SOURCE: SIGNIFICANT CHANGE UP
SPECIMEN SOURCE: SIGNIFICANT CHANGE UP
WBC # BLD: 13.54 K/UL — HIGH (ref 3.8–10.5)
WBC # FLD AUTO: 13.54 K/UL — HIGH (ref 3.8–10.5)

## 2022-05-30 PROCEDURE — 99232 SBSQ HOSP IP/OBS MODERATE 35: CPT | Mod: GC

## 2022-05-30 RX ADMIN — PIPERACILLIN AND TAZOBACTAM 25 GRAM(S): 4; .5 INJECTION, POWDER, LYOPHILIZED, FOR SOLUTION INTRAVENOUS at 20:36

## 2022-05-30 RX ADMIN — PIPERACILLIN AND TAZOBACTAM 25 GRAM(S): 4; .5 INJECTION, POWDER, LYOPHILIZED, FOR SOLUTION INTRAVENOUS at 13:09

## 2022-05-30 RX ADMIN — Medication 4 UNIT(S): at 17:53

## 2022-05-30 RX ADMIN — Medication 3 MILLIGRAM(S): at 00:03

## 2022-05-30 RX ADMIN — INSULIN GLARGINE 10 UNIT(S): 100 INJECTION, SOLUTION SUBCUTANEOUS at 22:06

## 2022-05-30 RX ADMIN — ENOXAPARIN SODIUM 40 MILLIGRAM(S): 100 INJECTION SUBCUTANEOUS at 17:54

## 2022-05-30 RX ADMIN — PIPERACILLIN AND TAZOBACTAM 25 GRAM(S): 4; .5 INJECTION, POWDER, LYOPHILIZED, FOR SOLUTION INTRAVENOUS at 05:00

## 2022-05-30 RX ADMIN — Medication 4 UNIT(S): at 08:54

## 2022-05-30 RX ADMIN — Medication 4 UNIT(S): at 13:27

## 2022-05-30 NOTE — PROGRESS NOTE ADULT - PROBLEM SELECTOR PLAN 5
DVT prophylaxis: lovenox daily   Diet: fiber diet   Dispo: PT recommend Home PT   HTN: bps elevated to sbp 140s, likely undiagnosed HTN. Will continue to monitor for now at this time - Patient verbalized that she "just wants to end it"  - Expresses sadness that friends and family have either  or moved away   - Denies plan   - Psych consulted

## 2022-05-30 NOTE — PROGRESS NOTE ADULT - SUBJECTIVE AND OBJECTIVE BOX
PROGRESS NOTE:     CONTACT INFO:  Du Landin MD | PGY-1  Pager: [phone] [place], [address] [place]  Also on TEAMS  After 7pm page night float  On weekends after 1pm check resident assignment tab to see who is covering      Patient is a 86y old  Female who presents with a chief complaint of Terminal Ileitis (29 May 2022 07:04)      SUBJECTIVE / OVERNIGHT EVENTS:    - No acute events overnight.   - No fevers/chills.  - Patient seen and evaluated at bedside.  - Denies SOB at rest, chest pain, palpitations, abdominal pain, nausea/vomiting    ADDITIONAL REVIEW OF SYSTEMS:    MEDICATIONS  (STANDING):  dextrose 5%. 1000 milliLiter(s) (50 mL/Hr) IV Continuous <Continuous>  dextrose 5%. 1000 milliLiter(s) (100 mL/Hr) IV Continuous <Continuous>  dextrose 50% Injectable 25 Gram(s) IV Push once  dextrose 50% Injectable 12.5 Gram(s) IV Push once  dextrose 50% Injectable 25 Gram(s) IV Push once  enoxaparin Injectable 40 milliGRAM(s) SubCutaneous every 24 hours  glucagon  Injectable 1 milliGRAM(s) IntraMuscular once  hydrocortisone 1% Cream 1 Application(s) Topical two times a day  insulin glargine Injectable (LANTUS) 10 Unit(s) SubCutaneous at bedtime  insulin lispro (ADMELOG) corrective regimen sliding scale   SubCutaneous three times a day before meals  insulin lispro (ADMELOG) corrective regimen sliding scale   SubCutaneous at bedtime  insulin lispro Injectable (ADMELOG) 4 Unit(s) SubCutaneous three times a day before meals  piperacillin/tazobactam IVPB.. 3.375 Gram(s) IV Intermittent every 8 hours    MEDICATIONS  (PRN):  acetaminophen     Tablet .. 650 milliGRAM(s) Oral every 6 hours PRN Temp greater or equal to 38C (100.4F), Mild Pain (1 - 3), Moderate Pain (4 - 6)  aluminum hydroxide/magnesium hydroxide/simethicone Suspension 30 milliLiter(s) Oral every 4 hours PRN Dyspepsia  dextrose Oral Gel 15 Gram(s) Oral once PRN Blood Glucose LESS THAN 70 milliGRAM(s)/deciliter  melatonin 3 milliGRAM(s) Oral at bedtime PRN Insomnia      CAPILLARY BLOOD GLUCOSE      POCT Blood Glucose.: 167 mg/dL (29 May 2022 22:24)  POCT Blood Glucose.: 214 mg/dL (29 May 2022 18:03)  POCT Blood Glucose.: 169 mg/dL (29 May 2022 13:33)  POCT Blood Glucose.: 189 mg/dL (29 May 2022 12:24)  POCT Blood Glucose.: 171 mg/dL (29 May 2022 08:29)    I&O's Summary    29 May 2022 07:01  -  30 May 2022 07:00  --------------------------------------------------------  IN: 300 mL / OUT: 400 mL / NET: -100 mL        PHYSICAL EXAM:  Vital Signs Last 24 Hrs  T(C): 37.1 (30 May 2022 04:45), Max: 37.9 (29 May 2022 21:18)  T(F): 98.8 (30 May 2022 04:45), Max: 100.2 (29 May 2022 21:18)  HR: 72 (30 May 2022 04:45) (72 - 78)  BP: 146/61 (30 May 2022 04:45) (125/65 - 146/82)  BP(mean): --  RR: 18 (30 May 2022 04:45) (18 - 18)  SpO2: 96% (30 May 2022 04:45) (96% - 97%)    CONSTITUTIONAL: NAD, well-developed  RESPIRATORY: Normal respiratory effort; lungs are clear to auscultation bilaterally  CARDIOVASCULAR: Regular rate and rhythm, normal S1 and S2, no murmur/rub/gallop; No lower extremity edema; Peripheral pulses are 2+ bilaterally  ABDOMEN: Nontender to palpation, normoactive bowel sounds, no rebound/guarding; No hepatosplenomegaly  MUSCULOSKELETAL: no clubbing or cyanosis of digits; no joint swelling or tenderness to palpation  PSYCH: A+O to person, place, and time; affect appropriate    LABS:                        11.6   13.62 )-----------( 302      ( 29 May 2022 07:30 )             35.9     05-29    136  |  98  |  18  ----------------------------<  155<H>  3.9   |  26  |  0.94    Ca    8.9      29 May 2022 07:29  Phos  3.6     05-29  Mg     2.0     05-29    TPro  5.6<L>  /  Alb  2.3<L>  /  TBili  0.4  /  DBili  x   /  AST  18  /  ALT  11  /  AlkPhos  87  05-29                RADIOLOGY & ADDITIONAL TESTS:  Results Reviewed:   Imaging Personally Reviewed:  Electrocardiogram Personally Reviewed:    COORDINATION OF CARE:  Care Discussed with Consultants/Other Providers [Y/N]: Y  Prior or Outpatient Records Reviewed [Y/N]: Y   PROGRESS NOTE:     CONTACT INFO:  Du Landin MD | PGY-1  Pager: [phone] [place], [address] [place]  Also on TEAMS  After 7pm page night float  On weekends after 1pm check resident assignment tab to see who is covering      Patient is a 86y old  Female who presents with a chief complaint of Terminal Ileitis (29 May 2022 07:04)      SUBJECTIVE / OVERNIGHT EVENTS:    - No acute events overnight   - Says that her abdominal pain is improving   - Says "to let her die," asked reason and answered by saying that all her friends are either dead or left her. Not endorsing depression.    - Confirmed by nurse that this has not happened before   - Patient seen and evaluated at bedside.  - Denies SOB at rest, chest pain, palpitations, nausea/vomiting    ADDITIONAL REVIEW OF SYSTEMS:    MEDICATIONS  (STANDING):  dextrose 5%. 1000 milliLiter(s) (50 mL/Hr) IV Continuous <Continuous>  dextrose 5%. 1000 milliLiter(s) (100 mL/Hr) IV Continuous <Continuous>  dextrose 50% Injectable 25 Gram(s) IV Push once  dextrose 50% Injectable 12.5 Gram(s) IV Push once  dextrose 50% Injectable 25 Gram(s) IV Push once  enoxaparin Injectable 40 milliGRAM(s) SubCutaneous every 24 hours  glucagon  Injectable 1 milliGRAM(s) IntraMuscular once  hydrocortisone 1% Cream 1 Application(s) Topical two times a day  insulin glargine Injectable (LANTUS) 10 Unit(s) SubCutaneous at bedtime  insulin lispro (ADMELOG) corrective regimen sliding scale   SubCutaneous three times a day before meals  insulin lispro (ADMELOG) corrective regimen sliding scale   SubCutaneous at bedtime  insulin lispro Injectable (ADMELOG) 4 Unit(s) SubCutaneous three times a day before meals  piperacillin/tazobactam IVPB.. 3.375 Gram(s) IV Intermittent every 8 hours    MEDICATIONS  (PRN):  acetaminophen     Tablet .. 650 milliGRAM(s) Oral every 6 hours PRN Temp greater or equal to 38C (100.4F), Mild Pain (1 - 3), Moderate Pain (4 - 6)  aluminum hydroxide/magnesium hydroxide/simethicone Suspension 30 milliLiter(s) Oral every 4 hours PRN Dyspepsia  dextrose Oral Gel 15 Gram(s) Oral once PRN Blood Glucose LESS THAN 70 milliGRAM(s)/deciliter  melatonin 3 milliGRAM(s) Oral at bedtime PRN Insomnia      CAPILLARY BLOOD GLUCOSE      POCT Blood Glucose.: 167 mg/dL (29 May 2022 22:24)  POCT Blood Glucose.: 214 mg/dL (29 May 2022 18:03)  POCT Blood Glucose.: 169 mg/dL (29 May 2022 13:33)  POCT Blood Glucose.: 189 mg/dL (29 May 2022 12:24)  POCT Blood Glucose.: 171 mg/dL (29 May 2022 08:29)    I&O's Summary    29 May 2022 07:01  -  30 May 2022 07:00  --------------------------------------------------------  IN: 300 mL / OUT: 400 mL / NET: -100 mL        PHYSICAL EXAM:  Vital Signs Last 24 Hrs  T(C): 37.1 (30 May 2022 04:45), Max: 37.9 (29 May 2022 21:18)  T(F): 98.8 (30 May 2022 04:45), Max: 100.2 (29 May 2022 21:18)  HR: 72 (30 May 2022 04:45) (72 - 78)  BP: 146/61 (30 May 2022 04:45) (125/65 - 146/82)  BP(mean): --  RR: 18 (30 May 2022 04:45) (18 - 18)  SpO2: 96% (30 May 2022 04:45) (96% - 97%)    CONSTITUTIONAL: NAD, well-developed  RESPIRATORY: Normal respiratory effort; lungs are clear to auscultation bilaterally  CARDIOVASCULAR: Regular rate and rhythm, normal S1 and S2, no murmur/rub/gallop; No lower extremity edema; Peripheral pulses are 2+ bilaterally  ABDOMEN: Nontender to palpation, normoactive bowel sounds, no rebound/guarding; No hepatosplenomegaly  MUSCULOSKELETAL: no clubbing or cyanosis of digits; no joint swelling or tenderness to palpation  PSYCH: A+O to person, place, and time; affect appropriate    LABS:                        11.6   13.62 )-----------( 302      ( 29 May 2022 07:30 )             35.9     05-29    136  |  98  |  18  ----------------------------<  155<H>  3.9   |  26  |  0.94    Ca    8.9      29 May 2022 07:29  Phos  3.6     05-29  Mg     2.0     05-29    TPro  5.6<L>  /  Alb  2.3<L>  /  TBili  0.4  /  DBili  x   /  AST  18  /  ALT  11  /  AlkPhos  87  05-29                RADIOLOGY & ADDITIONAL TESTS:  Results Reviewed:   Imaging Personally Reviewed:  Electrocardiogram Personally Reviewed:    COORDINATION OF CARE:  Care Discussed with Consultants/Other Providers [Y/N]: Y  Prior or Outpatient Records Reviewed [Y/N]: ZAIRE   PROGRESS NOTE:     CONTACT INFO:  Du Landin MD | PGY-1  Pager: [phone] [place], [address] [place]  Also on TEAMS  After 7pm page night float  On weekends after 1pm check resident assignment tab to see who is covering      Patient is a 86y old  Female who presents with a chief complaint of Terminal Ileitis (29 May 2022 07:04)      SUBJECTIVE / OVERNIGHT EVENTS:    - No acute events overnight   - Says that her abdominal pain is improving   - Says "to let her die," asked reason and answered by saying that all her friends are either dead or left her. Not endorsing depression.    - Confirmed by nurse that this has not happened before   - Patient seen and evaluated at bedside.  - Denies SOB at rest, chest pain, palpitations, nausea/vomiting    ADDITIONAL REVIEW OF SYSTEMS:    MEDICATIONS  (STANDING):  dextrose 5%. 1000 milliLiter(s) (50 mL/Hr) IV Continuous <Continuous>  dextrose 5%. 1000 milliLiter(s) (100 mL/Hr) IV Continuous <Continuous>  dextrose 50% Injectable 25 Gram(s) IV Push once  dextrose 50% Injectable 12.5 Gram(s) IV Push once  dextrose 50% Injectable 25 Gram(s) IV Push once  enoxaparin Injectable 40 milliGRAM(s) SubCutaneous every 24 hours  glucagon  Injectable 1 milliGRAM(s) IntraMuscular once  hydrocortisone 1% Cream 1 Application(s) Topical two times a day  insulin glargine Injectable (LANTUS) 10 Unit(s) SubCutaneous at bedtime  insulin lispro (ADMELOG) corrective regimen sliding scale   SubCutaneous three times a day before meals  insulin lispro (ADMELOG) corrective regimen sliding scale   SubCutaneous at bedtime  insulin lispro Injectable (ADMELOG) 4 Unit(s) SubCutaneous three times a day before meals  piperacillin/tazobactam IVPB.. 3.375 Gram(s) IV Intermittent every 8 hours    MEDICATIONS  (PRN):  acetaminophen     Tablet .. 650 milliGRAM(s) Oral every 6 hours PRN Temp greater or equal to 38C (100.4F), Mild Pain (1 - 3), Moderate Pain (4 - 6)  aluminum hydroxide/magnesium hydroxide/simethicone Suspension 30 milliLiter(s) Oral every 4 hours PRN Dyspepsia  dextrose Oral Gel 15 Gram(s) Oral once PRN Blood Glucose LESS THAN 70 milliGRAM(s)/deciliter  melatonin 3 milliGRAM(s) Oral at bedtime PRN Insomnia      CAPILLARY BLOOD GLUCOSE      POCT Blood Glucose.: 167 mg/dL (29 May 2022 22:24)  POCT Blood Glucose.: 214 mg/dL (29 May 2022 18:03)  POCT Blood Glucose.: 169 mg/dL (29 May 2022 13:33)  POCT Blood Glucose.: 189 mg/dL (29 May 2022 12:24)  POCT Blood Glucose.: 171 mg/dL (29 May 2022 08:29)    I&O's Summary    29 May 2022 07:01  -  30 May 2022 07:00  --------------------------------------------------------  IN: 300 mL / OUT: 400 mL / NET: -100 mL        PHYSICAL EXAM:  Vital Signs Last 24 Hrs  T(C): 37.1 (30 May 2022 04:45), Max: 37.9 (29 May 2022 21:18)  T(F): 98.8 (30 May 2022 04:45), Max: 100.2 (29 May 2022 21:18)  HR: 72 (30 May 2022 04:45) (72 - 78)  BP: 146/61 (30 May 2022 04:45) (125/65 - 146/82)  BP(mean): --  RR: 18 (30 May 2022 04:45) (18 - 18)  SpO2: 96% (30 May 2022 04:45) (96% - 97%)    CONSTITUTIONAL: NAD, age-appropriate   RESPIRATORY: Normal respiratory effort; lungs are clear to auscultation bilaterally  CARDIOVASCULAR: Regular rate and rhythm, normal S1 and S2, no murmur/rub/gallop; No lower extremity edema; Peripheral pulses are 2+ bilaterally  ABDOMEN: Diffuse tenderness to palpation, particularly in RUQ , normoactive bowel sounds, +rebound/guarding;   MUSCULOSKELETAL: no clubbing or cyanosis of digits; no joint swelling or tenderness to palpation  PSYCH: A+O to person, place, and time; affect appropriate      LABS:                        11.6   13.62 )-----------( 302      ( 29 May 2022 07:30 )             35.9     05-29    136  |  98  |  18  ----------------------------<  155<H>  3.9   |  26  |  0.94    Ca    8.9      29 May 2022 07:29  Phos  3.6     05-29  Mg     2.0     05-29    TPro  5.6<L>  /  Alb  2.3<L>  /  TBili  0.4  /  DBili  x   /  AST  18  /  ALT  11  /  AlkPhos  87  05-29                RADIOLOGY & ADDITIONAL TESTS:  Results Reviewed:   Imaging Personally Reviewed:  Electrocardiogram Personally Reviewed:    COORDINATION OF CARE:  Care Discussed with Consultants/Other Providers [Y/N]: Y  Prior or Outpatient Records Reviewed [Y/N]: ZAIRE

## 2022-05-30 NOTE — PROGRESS NOTE ADULT - ATTENDING COMMENTS
No ON events. reports abdominal pain improved, no n/v, f/c.  Denies SI/hI to me. RUE swelling improving, no redness or tenderness on exam      # severe sepsis likely 2/2 Terminal ileitis  -tenderness improved   -UA and BCX NGTD  -initial CT angio revealed distal terminal ileitis and c/f small intramural abscess  -repeat CT 5/28 personally reviewed: Worsening distal ileitis. No intramural abscess. Small loculated ascites.  -meckel scan negative  -surgery following: no surgical intervention  -c/w zosyn q8  -wbc stable at 13  -f/u further GI recs      RUE swelling: due to infiltrated IV, no sign of cellulitis  -elevated RUE  -improving    #T2DM: FS stable  -c/w lantus 10 U qhs and admelog 4 U TID premeal  -A1c 9.3%, will likely need to start insulin therapy and f/u outpatient endocrine     r/o depression: psych eval, denies SI/HI to me    Dispo: pending clinical improvement,     home w/ home PT.

## 2022-05-30 NOTE — PROGRESS NOTE ADULT - PROBLEM SELECTOR PLAN 7
R/o meckel's diverticulum per surgery  - meckels scan (-)tive   - Famotidine 50 x 1 ordered per surgery  - DC jackson and commence TOV

## 2022-05-30 NOTE — PROGRESS NOTE ADULT - ASSESSMENT
86F w/ PMH T2DM who presents w/ abdominal and shoulder pain, found to have severe sepsis int he setting of terminal ileitis and possible intramural abscess as seen on CTA. Meckel scan negative.    Plan:  -DVT ppx  -Continue ABX  -Pain control  -OOBAT  -care as per primary team    [facility]  [id number] 86F w/ PMH T2DM who presents w/ abdominal and shoulder pain, found to have severe sepsis int he setting of terminal ileitis and possible intramural abscess as seen on CTA. Meckel scan negative. CT5/28 shows worsening ileitis but no intramural abscess.     Plan:  -DVT ppx  -Continue ABX  -Pain control  -OOBAT  -care as per primary team    [facility]  [id number]

## 2022-05-30 NOTE — PROGRESS NOTE ADULT - ASSESSMENT
86F w/ PMH T2DM who presents w/ abdominal and shoulder pain, found to have severe sepsis int he setting of terminal ileitis and possible intramural abscess as seen on CTA. Meckel scan negative. S/p CT scan, to consult surgery as to further management. Pending possible colonoscopy.

## 2022-05-30 NOTE — PROGRESS NOTE ADULT - PROBLEM SELECTOR PLAN 1
Admitted with severe sepsis (Tmax 101.2, leukocytosis, tachycardic to 116-123, 15.3% bands, lactate to 3.7); s/p total 1.5 L   - CT angio revealed distal terminal ileitis and concern for small intramural abscess   - CXR clear, UA negative, pending BCX  - GI PCR (-)tive  - f/u C. diff   - c/w zosyn (5/24 - )  - BCx 5/23 grew 1 out of 2 bottles CoNS; repeat BCx 4/24 NGTD, likely contaminant, will not treat i/s/o severe sepsis   - CT angio revealed distal terminal ileitis and c/f small intramural abscess  - surgery consulted; no surgical intervention warranted as abcess too small for percutaneous drainage   - GI consulted; f/u infectious work up. May consider colonoscopy if infectious work up negative  - c/w low residual diet  - CT abdomen scan demonstrate Worsening distal ileitis. No intramural abscess. Small loculated ascites.   - Place on clears in AM

## 2022-05-30 NOTE — PROGRESS NOTE ADULT - ATTENDING COMMENTS
Patient seen/examined.  Agree w above note and plan and have discussed plan w house staff.  Comfortable, abdomen less tneder.  Abx    Baldo Dunaway MD

## 2022-05-30 NOTE — PROGRESS NOTE ADULT - PROBLEM SELECTOR PLAN 2
-CT angio revealed distal terminal ileitis and c/f small intramural abscess  -surgery consulted; no surgical intervention warranted as abcess too small for percutaneous drainage   -GI consulted; f/u infectious work up. May consider colonoscopy if infectious work up negative  -c/w low residual diet  - CT abdomen scan demonstrate Worsening distal ileitis. No intramural abscess. Small loculated ascites. Surgery informed   - see plan as above -CT angio revealed distal terminal ileitis and c/f small intramural abscess  -surgery consulted; no surgical intervention warranted as abcess too small for percutaneous drainage   -GI consulted; f/u infectious work up. May consider colonoscopy if infectious work up negative  -c/w low residual diet  - CT abdomen scan demonstrate Worsening distal ileitis. No intramural abscess. Small loculated ascites.   - Surgery informed NGTD    - see plan as above Admitted with severe sepsis (Tmax 101.2, leukocytosis, tachycardic to 116-123, 15.3% bands, lactate to 3.7); s/p total 1.5 L   - CT angio revealed distal terminal ileitis and concern for small intramural abscess   - CXR clear, UA negative, pending BCX  - GI PCR (-)tive  - f/u C. diff   - c/w zosyn (5/24 - )  - BCx 5/23 grew 1 out of 2 bottles CoNS; repeat BCx 4/24 NGTD, likely contaminant, will not treat

## 2022-05-30 NOTE — PROGRESS NOTE ADULT - PROBLEM SELECTOR PLAN 4
Per Pharmacy, patient takes prandin and amaryl at home   - hold prandin and amaryl during inpatient   - A1C 9.3  - c/w patient on lantus 10U and increase mealtime to 4U; titrate to 100-180 FS  - c/w DIGNA/FS qac, qhs  - carb consistent low residual diet  - to convert to liquids if GI determines patient indicated for scope DVT prophylaxis: lovenox daily   Diet: carb consistent low residual diet, convert to liquids in AM  Dispo: PT recommend Home PT   HTN: bps elevated to sbp 140s, likely undiagnosed HTN. Will continue to monitor for now at this time

## 2022-05-30 NOTE — PROGRESS NOTE ADULT - PROBLEM SELECTOR PLAN 3
Appears to be a chronic issue; Endorses b/l shoulder and upper back pain that she has had for "long time"   -no temporality to it, worsens w/ movement, non tender to palpation   -CTA negative for aortic dissection   -differential includes MSK   -ESR elevated, but may be in the setting of infection  -tylenol PRN for symptom control Per Pharmacy, patient takes prandin and amaryl at home   - hold prandin and amaryl during inpatient   - A1C 9.3  - c/w patient on lantus 10U and increase mealtime to 4U; titrate to 100-180 FS  - c/w DIGNA/FS qac, qhs

## 2022-05-30 NOTE — PROGRESS NOTE ADULT - SUBJECTIVE AND OBJECTIVE BOX
[facility] Progress Note    SUBJECTIVE:  Patient seen and examined at bedside with surgical team.   No acute events overnight.   Endorses RLQ pain. No n.v, Pain is better.     OBJECTIVE:    Vital Signs Last 24 Hrs  T(C): 37.1 (30 May 2022 04:45), Max: 37.9 (29 May 2022 21:18)  T(F): 98.8 (30 May 2022 04:45), Max: 100.2 (29 May 2022 21:18)  HR: 72 (30 May 2022 04:45) (72 - 78)  BP: 146/61 (30 May 2022 04:45) (125/65 - 146/82)  BP(mean): --  RR: 18 (30 May 2022 04:45) (18 - 18)  SpO2: 96% (30 May 2022 04:45) (96% - 97%)    MEDICATIONS  (STANDING):  dextrose 5%. 1000 milliLiter(s) (50 mL/Hr) IV Continuous <Continuous>  dextrose 5%. 1000 milliLiter(s) (100 mL/Hr) IV Continuous <Continuous>  dextrose 50% Injectable 25 Gram(s) IV Push once  dextrose 50% Injectable 12.5 Gram(s) IV Push once  dextrose 50% Injectable 25 Gram(s) IV Push once  enoxaparin Injectable 40 milliGRAM(s) SubCutaneous every 24 hours  glucagon  Injectable 1 milliGRAM(s) IntraMuscular once  hydrocortisone 1% Cream 1 Application(s) Topical two times a day  insulin glargine Injectable (LANTUS) 10 Unit(s) SubCutaneous at bedtime  insulin lispro (ADMELOG) corrective regimen sliding scale   SubCutaneous three times a day before meals  insulin lispro (ADMELOG) corrective regimen sliding scale   SubCutaneous at bedtime  insulin lispro Injectable (ADMELOG) 4 Unit(s) SubCutaneous three times a day before meals  piperacillin/tazobactam IVPB.. 3.375 Gram(s) IV Intermittent every 8 hours    MEDICATIONS  (PRN):  acetaminophen     Tablet .. 650 milliGRAM(s) Oral every 6 hours PRN Temp greater or equal to 38C (100.4F), Mild Pain (1 - 3), Moderate Pain (4 - 6)  aluminum hydroxide/magnesium hydroxide/simethicone Suspension 30 milliLiter(s) Oral every 4 hours PRN Dyspepsia  dextrose Oral Gel 15 Gram(s) Oral once PRN Blood Glucose LESS THAN 70 milliGRAM(s)/deciliter  melatonin 3 milliGRAM(s) Oral at bedtime PRN Insomnia      Physical exam:  General; NAD  Respiratory: nonlabored breathing  Abdomen: soft, ND, RLQ TTP. No rebound or guarding  Extremities: WWP       LABS:                        11.8   13.54 )-----------( 369      ( 30 May 2022 07:47 )             35.3     05-30    136  |  98  |  16  ----------------------------<  130<H>  3.9   |  27  |  0.92    Ca    8.7      30 May 2022 07:47  Phos  3.7     05-30  Mg     2.0     05-30    TPro  5.3<L>  /  Alb  2.5<L>  /  TBili  0.4  /  DBili  x   /  AST  18  /  ALT  11  /  AlkPhos  94  05-30      LIVER FUNCTIONS - ( 30 May 2022 07:47 )  Alb: 2.5 g/dL / Pro: 5.3 g/dL / ALK PHOS: 94 U/L / ALT: 11 U/L / AST: 18 U/L / GGT: x

## 2022-05-30 NOTE — PROGRESS NOTE ADULT - PROBLEM SELECTOR PROBLEM 7
R/O Meckel diverticulum

## 2022-05-31 LAB
ALBUMIN SERPL ELPH-MCNC: 2.4 G/DL — LOW (ref 3.3–5)
ALP SERPL-CCNC: 101 U/L — SIGNIFICANT CHANGE UP (ref 40–120)
ALT FLD-CCNC: 12 U/L — SIGNIFICANT CHANGE UP (ref 10–45)
ANION GAP SERPL CALC-SCNC: 12 MMOL/L — SIGNIFICANT CHANGE UP (ref 5–17)
AST SERPL-CCNC: 20 U/L — SIGNIFICANT CHANGE UP (ref 10–40)
BILIRUB SERPL-MCNC: 0.4 MG/DL — SIGNIFICANT CHANGE UP (ref 0.2–1.2)
BUN SERPL-MCNC: 14 MG/DL — SIGNIFICANT CHANGE UP (ref 7–23)
CALCIUM SERPL-MCNC: 8.7 MG/DL — SIGNIFICANT CHANGE UP (ref 8.4–10.5)
CHLORIDE SERPL-SCNC: 100 MMOL/L — SIGNIFICANT CHANGE UP (ref 96–108)
CO2 SERPL-SCNC: 27 MMOL/L — SIGNIFICANT CHANGE UP (ref 22–31)
CREAT SERPL-MCNC: 0.8 MG/DL — SIGNIFICANT CHANGE UP (ref 0.5–1.3)
EGFR: 72 ML/MIN/1.73M2 — SIGNIFICANT CHANGE UP
GLUCOSE BLDC GLUCOMTR-MCNC: 129 MG/DL — HIGH (ref 70–99)
GLUCOSE BLDC GLUCOMTR-MCNC: 170 MG/DL — HIGH (ref 70–99)
GLUCOSE BLDC GLUCOMTR-MCNC: 174 MG/DL — HIGH (ref 70–99)
GLUCOSE BLDC GLUCOMTR-MCNC: 95 MG/DL — SIGNIFICANT CHANGE UP (ref 70–99)
GLUCOSE BLDC GLUCOMTR-MCNC: 97 MG/DL — SIGNIFICANT CHANGE UP (ref 70–99)
GLUCOSE SERPL-MCNC: 89 MG/DL — SIGNIFICANT CHANGE UP (ref 70–99)
HCT VFR BLD CALC: 36.6 % — SIGNIFICANT CHANGE UP (ref 34.5–45)
HGB BLD-MCNC: 11.9 G/DL — SIGNIFICANT CHANGE UP (ref 11.5–15.5)
MAGNESIUM SERPL-MCNC: 2.1 MG/DL — SIGNIFICANT CHANGE UP (ref 1.6–2.6)
MCHC RBC-ENTMCNC: 30.8 PG — SIGNIFICANT CHANGE UP (ref 27–34)
MCHC RBC-ENTMCNC: 32.5 GM/DL — SIGNIFICANT CHANGE UP (ref 32–36)
MCV RBC AUTO: 94.8 FL — SIGNIFICANT CHANGE UP (ref 80–100)
NRBC # BLD: 0 /100 WBCS — SIGNIFICANT CHANGE UP (ref 0–0)
PHOSPHATE SERPL-MCNC: 4 MG/DL — SIGNIFICANT CHANGE UP (ref 2.5–4.5)
PLATELET # BLD AUTO: 381 K/UL — SIGNIFICANT CHANGE UP (ref 150–400)
POTASSIUM SERPL-MCNC: 3.8 MMOL/L — SIGNIFICANT CHANGE UP (ref 3.5–5.3)
POTASSIUM SERPL-SCNC: 3.8 MMOL/L — SIGNIFICANT CHANGE UP (ref 3.5–5.3)
PROT SERPL-MCNC: 6 G/DL — SIGNIFICANT CHANGE UP (ref 6–8.3)
RBC # BLD: 3.86 M/UL — SIGNIFICANT CHANGE UP (ref 3.8–5.2)
RBC # FLD: 12.3 % — SIGNIFICANT CHANGE UP (ref 10.3–14.5)
SARS-COV-2 RNA SPEC QL NAA+PROBE: SIGNIFICANT CHANGE UP
SODIUM SERPL-SCNC: 139 MMOL/L — SIGNIFICANT CHANGE UP (ref 135–145)
WBC # BLD: 17.12 K/UL — HIGH (ref 3.8–10.5)
WBC # FLD AUTO: 17.12 K/UL — HIGH (ref 3.8–10.5)

## 2022-05-31 PROCEDURE — 99222 1ST HOSP IP/OBS MODERATE 55: CPT

## 2022-05-31 PROCEDURE — 99232 SBSQ HOSP IP/OBS MODERATE 35: CPT

## 2022-05-31 RX ORDER — INSULIN GLARGINE 100 [IU]/ML
5 INJECTION, SOLUTION SUBCUTANEOUS AT BEDTIME
Refills: 0 | Status: DISCONTINUED | OUTPATIENT
Start: 2022-05-31 | End: 2022-06-06

## 2022-05-31 RX ORDER — SOD SULF/SODIUM/NAHCO3/KCL/PEG
4000 SOLUTION, RECONSTITUTED, ORAL ORAL ONCE
Refills: 0 | Status: DISCONTINUED | OUTPATIENT
Start: 2022-05-31 | End: 2022-05-31

## 2022-05-31 RX ORDER — PIPERACILLIN AND TAZOBACTAM 4; .5 G/20ML; G/20ML
3.38 INJECTION, POWDER, LYOPHILIZED, FOR SOLUTION INTRAVENOUS EVERY 8 HOURS
Refills: 0 | Status: DISCONTINUED | OUTPATIENT
Start: 2022-05-31 | End: 2022-06-01

## 2022-05-31 RX ORDER — MIRTAZAPINE 45 MG/1
7.5 TABLET, ORALLY DISINTEGRATING ORAL AT BEDTIME
Refills: 0 | Status: DISCONTINUED | OUTPATIENT
Start: 2022-05-31 | End: 2022-06-06

## 2022-05-31 RX ORDER — BUPROPION HYDROCHLORIDE 150 MG/1
150 TABLET, EXTENDED RELEASE ORAL DAILY
Refills: 0 | Status: DISCONTINUED | OUTPATIENT
Start: 2022-06-01 | End: 2022-06-02

## 2022-05-31 RX ADMIN — MIRTAZAPINE 7.5 MILLIGRAM(S): 45 TABLET, ORALLY DISINTEGRATING ORAL at 21:24

## 2022-05-31 RX ADMIN — Medication 3 MILLIGRAM(S): at 21:24

## 2022-05-31 RX ADMIN — INSULIN GLARGINE 5 UNIT(S): 100 INJECTION, SOLUTION SUBCUTANEOUS at 22:07

## 2022-05-31 RX ADMIN — Medication 4 UNIT(S): at 13:41

## 2022-05-31 RX ADMIN — Medication 4 UNIT(S): at 08:56

## 2022-05-31 RX ADMIN — Medication 4 UNIT(S): at 17:44

## 2022-05-31 RX ADMIN — PIPERACILLIN AND TAZOBACTAM 25 GRAM(S): 4; .5 INJECTION, POWDER, LYOPHILIZED, FOR SOLUTION INTRAVENOUS at 21:24

## 2022-05-31 RX ADMIN — ENOXAPARIN SODIUM 40 MILLIGRAM(S): 100 INJECTION SUBCUTANEOUS at 17:51

## 2022-05-31 RX ADMIN — PIPERACILLIN AND TAZOBACTAM 25 GRAM(S): 4; .5 INJECTION, POWDER, LYOPHILIZED, FOR SOLUTION INTRAVENOUS at 14:46

## 2022-05-31 RX ADMIN — PIPERACILLIN AND TAZOBACTAM 25 GRAM(S): 4; .5 INJECTION, POWDER, LYOPHILIZED, FOR SOLUTION INTRAVENOUS at 05:11

## 2022-05-31 RX ADMIN — Medication 1 APPLICATION(S): at 17:53

## 2022-05-31 NOTE — PROGRESS NOTE ADULT - ASSESSMENT
86F w/ PMH T2DM who presents w/ abdominal and shoulder pain, found to have severe sepsis int he setting of terminal ileitis and possible intramural abscess as seen on CTA. Meckel scan negative. CT5/28 shows worsening ileitis but no intramural abscess.     Plan:  -Rec GI and ID to see patient due to worsening CT scan  -DVT ppx  -Continue ABX  -Pain control  -OOBAT  -care as per primary team    [facility]  [id number]

## 2022-05-31 NOTE — PROGRESS NOTE ADULT - ATTENDING COMMENTS
Patient is an 86 y.o F w/ PMH T2DM who presents w/ abdominal pain, found to have severe sepsis int he setting of terminal ileitis, course c/b worsening RUQ pain, found to have worsening terminal ileitis on repeat CT abd/pelvis. Pending colonoscopy tomorrow     #Terminal ileitis  -Initially admitted w/ severe sepsis likely 2/2 terminal ileitis as seen on imaging; UA and BCX NGTD  -CT angio revealed distal terminal ileitis and c/f small intramural abscess; course c/b worsening RUQ pain and so repeat CT abd/pelvis performed-demonstrated worsening terminal ileitis  -surgery consulted; no surgical intervention warranted as abscess too small for percutaneous drainage   -c/w zosyn for now  -Stool culture negative; however, obtained after several days of abx   -GI consulted; plan for colonoscopy Thursday   -order CLD tomorrow; GI to order prep      #RUQ pain   -Patient continues to endorse RUQ pain, unclear etiology   -RUQ U/S revealed gall bladder sludge, some edema in gall bladder well in the setting of ascites   -Meckel scan (5/27) negative for ectopic tissue  -Repeat CT abd/pelvis w/ worsening terminal ileitis  -see plan as above     #T2DM  -Hx of T2DM, home medicaitons include prandin and amaryl, had been on metformin but unclear why discontinued   -lss, monitor FS before meals and at bedtime  -carb consistent diet   -c/w lantus 5 U qhs and admelog 4 U TID premeal  -A1c 9.3%, will likely need to start insulin therapy and f/u outpatient endocrine     Dispo: pending colonoscopy Thursday  PT recommend home w/ home PT.

## 2022-05-31 NOTE — BH CONSULTATION LIAISON ASSESSMENT NOTE - NSBHCHARTREVIEWVS_PSY_A_CORE FT
Vital Signs Last 24 Hrs  T(C): 36.5 (31 May 2022 05:26), Max: 37.2 (30 May 2022 11:55)  T(F): 97.7 (31 May 2022 05:26), Max: 99 (30 May 2022 11:55)  HR: 67 (31 May 2022 05:26) (61 - 67)  BP: 141/59 (31 May 2022 05:26) (134/79 - 145/54)  BP(mean): --  RR: 18 (31 May 2022 05:26) (18 - 19)  SpO2: 97% (31 May 2022 05:26) (94% - 97%)

## 2022-05-31 NOTE — PROGRESS NOTE ADULT - PROBLEM SELECTOR PLAN 4
DVT prophylaxis: lovenox daily   Diet: carb consistent low residual diet, convert to liquids in AM  Dispo: PT recommend Home PT   HTN: bps elevated to sbp 140s, likely undiagnosed HTN. Will continue to monitor for now at this time DVT prophylaxis: lovenox daily   Diet: liquid diet   Dispo: PT recommend Home PT   HTN: bps elevated to sbp 140s, likely undiagnosed HTN. Will continue to monitor for now at this time

## 2022-05-31 NOTE — BH CONSULTATION LIAISON ASSESSMENT NOTE - SUMMARY
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 She has no h/o psych illness or hospitalization. Psych is consulted for SI. Pt is cooperative and AOx3. She reports that she has suicidal thoughts once a week throughout her life but doesn't know why she has these thoughts and denies any triggering events. She states that she would "take a bottle of aspirin" if she decides to end her life. Denies h/o of suicidal attempts or FH of suicide. She states that she has great support systems from her friends and family. Pt endorses her mood is "normal." Denies any sleep disturbances, loss of interest in doing regular activities, changes in concentration or appetite. Denies substance abuse, chong, paranoid thoughts, hallucinations, active SI plans.  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 She has no h/o psych illness or hospitalization. Psych is consulted for SI. Pt is cooperative and AOx3. She reports that she has suicidal thoughts once a week throughout her life but doesn't know why she has these thoughts and denies any triggering events. She states that she would "take a bottle of aspirin" if she decides to end her life, but has no plan to do this at this time. Denies h/o of suicidal attempts or FH of suicide. She states that she has great support systems from her friends and family. Pt endorses her mood is "normal." Denies any sleep disturbances, loss of interest in doing regular activities, changes in concentration or appetite. Denies substance abuse, chong, paranoid thoughts, hallucinations, active SI plans.

## 2022-05-31 NOTE — PROGRESS NOTE ADULT - PROBLEM SELECTOR PLAN 5
- Patient verbalized that she "just wants to end it"  - Expresses sadness that friends and family have either  or moved away   - Denies plan   - Psych consulted - Patient verbalized that she "just wants to end it"  - Expresses sadness that friends and family have either  or moved away   - Denies plan   - Psych consulted, appreciate recs   - started remeron 7.5mg PO Qhs   - started wellbutryn XL 150mg PO Qd

## 2022-05-31 NOTE — PROGRESS NOTE ADULT - PROBLEM SELECTOR PLAN 3
Per Pharmacy, patient takes prandin and amaryl at home   - hold prandin and amaryl during inpatient   - A1C 9.3  - c/w patient on lantus 10U and increase mealtime to 4U; titrate to 100-180 FS  - c/w DIGNA/FS qac, qhs Per Pharmacy, patient takes prandin and amaryl at home   - hold prandin and amaryl during inpatient   - May start metformin 500mg QD outpatient    - A1C 9.3  - decrease lantus from 10U to 5U and c/w mealtime 4U; titrate to 100-180 FS  - c/w DIGNA/FS qac, qhs

## 2022-05-31 NOTE — CHART NOTE - NSCHARTNOTEFT_GEN_A_CORE
less tender, no peritoneal signs  persistent leukocytosis and worsening ileitis on CT  agree w colo  cont abx  repeat imaging vs OR if clinically deteriorates

## 2022-05-31 NOTE — PROGRESS NOTE ADULT - ATTENDING COMMENTS
Repeat CT with worsening ileitis but no intramural or abscess collection  Today patient reports feeling better with no abdominal pain  On abx currently  Needs colonoscopy, though patient ate ureña and full breakfast this AM    Start clear liquid diet now  Plan for colonoscopy on Thursday

## 2022-05-31 NOTE — PROGRESS NOTE ADULT - SUBJECTIVE AND OBJECTIVE BOX
Chief Complaint:  Patient is a 86y old  Female who presents with a chief complaint of Terminal Ileitis (31 May 2022 07:10)      Interval Events: still with RLQ pain.    Allergies:  No Known Allergies      Hospital Medications:  acetaminophen     Tablet .. 650 milliGRAM(s) Oral every 6 hours PRN  aluminum hydroxide/magnesium hydroxide/simethicone Suspension 30 milliLiter(s) Oral every 4 hours PRN  dextrose 5%. 1000 milliLiter(s) IV Continuous <Continuous>  dextrose 5%. 1000 milliLiter(s) IV Continuous <Continuous>  dextrose 50% Injectable 25 Gram(s) IV Push once  dextrose 50% Injectable 12.5 Gram(s) IV Push once  dextrose 50% Injectable 25 Gram(s) IV Push once  dextrose Oral Gel 15 Gram(s) Oral once PRN  enoxaparin Injectable 40 milliGRAM(s) SubCutaneous every 24 hours  glucagon  Injectable 1 milliGRAM(s) IntraMuscular once  hydrocortisone 1% Cream 1 Application(s) Topical two times a day  insulin glargine Injectable (LANTUS) 10 Unit(s) SubCutaneous at bedtime  insulin lispro (ADMELOG) corrective regimen sliding scale   SubCutaneous three times a day before meals  insulin lispro (ADMELOG) corrective regimen sliding scale   SubCutaneous at bedtime  insulin lispro Injectable (ADMELOG) 4 Unit(s) SubCutaneous three times a day before meals  melatonin 3 milliGRAM(s) Oral at bedtime PRN      PMHX/PSHX:  Diabetes    No significant past surgical history    S/P TKR (total knee replacement)        Family history:  Family history of breast cancer in mother (Mother)        ROS: As per HPI, 14-point ROS negative otherwise.    General:  No wt loss, fevers, chills, night sweats, fatigue,   Eyes:  Good vision, no reported pain  ENT:  No sore throat, pain, runny nose, dysphagia  CV:  No pain, palpitations, hypo/hypertension  Resp:  No dyspnea, cough, tachypnea, wheezing  GI:  See HPI  :  No pain, bleeding, incontinence, nocturia  Muscle:  No pain, weakness  Neuro:  No weakness, tingling, memory problems  Psych:  No fatigue, insomnia, mood problems, depression  Endocrine:  No polyuria, polydipsia, cold/heat intolerance  Heme:  No petechiae, ecchymosis, easy bruisability  Skin:  No rash, edema      PHYSICAL EXAM:     Vital Signs:  Vital Signs Last 24 Hrs  T(C): 36.5 (31 May 2022 05:26), Max: 37.2 (30 May 2022 11:55)  T(F): 97.7 (31 May 2022 05:26), Max: 99 (30 May 2022 11:55)  HR: 67 (31 May 2022 05:26) (61 - 67)  BP: 141/59 (31 May 2022 05:26) (134/79 - 145/54)  BP(mean): --  RR: 18 (31 May 2022 05:26) (18 - 19)  SpO2: 97% (31 May 2022 05:26) (94% - 97%)  Daily     Daily     GENERAL:  appears comfortable, no acute distress  HEENT:  NC/AT,  conjunctivae clear, sclera -anicteric  CHEST:  no increased effort  HEART:  Regular rate and rhythm  ABDOMEN:  Soft, tender RLQ, non-distended,  no masses ,no hepato-splenomegaly,   EXTREMITIES:  no cyanosis, clubbing or edema  SKIN:  No rash/erythema/ecchymoses/petechiae/wounds  NEURO:  Alert, oriented    LABS:                        11.9   17.12 )-----------( 381      ( 31 May 2022 07:15 )             36.6     05-31    139  |  100  |  14  ----------------------------<  89  3.8   |  27  |  0.80    Ca    8.7      31 May 2022 07:15  Phos  4.0     05-31  Mg     2.1     05-31    TPro  6.0  /  Alb  2.4<L>  /  TBili  0.4  /  DBili  x   /  AST  20  /  ALT  12  /  AlkPhos  101  05-31    LIVER FUNCTIONS - ( 31 May 2022 07:15 )  Alb: 2.4 g/dL / Pro: 6.0 g/dL / ALK PHOS: 101 U/L / ALT: 12 U/L / AST: 20 U/L / GGT: x                   Imaging:      < from: CT Abdomen and Pelvis w/ Oral Cont and w/ IV Cont (05.28.22 @ 18:54) >    FINDINGS:  LOWER CHEST: Coronary artery calcifications. Small hiatal hernia.    LIVER: Within normal limits.  BILE DUCTS: Normal caliber.  GALLBLADDER: Layering sludge.  SPLEEN: Within normal limits.  PANCREAS: Calcifications in the pancreatic body.  ADRENALS: Unchanged thickened adrenal glands.  KIDNEYS/URETERS: Right peripelvic cyst. Trace bilateral perinephric fluid.    BLADDER: Small bubble of air in the bladder.  REPRODUCTIVE ORGANS: Hysterectomy.    BOWEL: Mildly hypoenhancing and thickened wall of the distal ileum with   mesenteric edema, findings likely representing worsening ileitis. No   intramural abscess. No bowel obstruction. Appendix is normal.  PERITONEUM: Trace ascites, partially loculated within mesentery. No   abscess.  VESSELS: The mesenteric vessels are patent. Atherosclerotic changes.  RETROPERITONEUM/LYMPH NODES: No lymphadenopathy.  ABDOMINAL WALL: Subcutaneous edema in the right anterolateral abdominal   wall. Small bubbles of air in the anterior abdominal wall (3:120), likely   secondary to subcutaneous injections.  BONES: Degenerative changes. Grade 1 anterolisthesis of L4 on L5.    IMPRESSION:  Worsening distal ileitis. No intramural abscess.    Small loculated ascites.    < end of copied text >

## 2022-05-31 NOTE — BH CONSULTATION LIAISON ASSESSMENT NOTE - RISK ASSESSMENT
Static RFs: Age, medical illness,   Modifiable RFs: living alone, access to lethal means  Protective factors: sense of responsibility to friends/family, social supports

## 2022-05-31 NOTE — PROGRESS NOTE ADULT - SUBJECTIVE AND OBJECTIVE BOX
PROGRESS NOTE:     CONTACT INFO:  Du Landin MD | PGY-1  Pager: [phone] [place], [phone] [facility]  Also on TEAMS  After 7pm page night float  On weekends after 1pm check resident assignment tab to see who is covering      Patient is a 86y old  Female who presents with a chief complaint of Terminal Ileitis (30 May 2022 09:27)      SUBJECTIVE / OVERNIGHT EVENTS:    - No acute events overnight.   - No fevers/chills.  - Patient seen and evaluated at bedside.  - Denies SOB at rest, chest pain, palpitations, abdominal pain, nausea/vomiting    ADDITIONAL REVIEW OF SYSTEMS:    MEDICATIONS  (STANDING):  dextrose 5%. 1000 milliLiter(s) (100 mL/Hr) IV Continuous <Continuous>  dextrose 5%. 1000 milliLiter(s) (50 mL/Hr) IV Continuous <Continuous>  dextrose 50% Injectable 25 Gram(s) IV Push once  dextrose 50% Injectable 12.5 Gram(s) IV Push once  dextrose 50% Injectable 25 Gram(s) IV Push once  enoxaparin Injectable 40 milliGRAM(s) SubCutaneous every 24 hours  glucagon  Injectable 1 milliGRAM(s) IntraMuscular once  hydrocortisone 1% Cream 1 Application(s) Topical two times a day  insulin glargine Injectable (LANTUS) 10 Unit(s) SubCutaneous at bedtime  insulin lispro (ADMELOG) corrective regimen sliding scale   SubCutaneous three times a day before meals  insulin lispro (ADMELOG) corrective regimen sliding scale   SubCutaneous at bedtime  insulin lispro Injectable (ADMELOG) 4 Unit(s) SubCutaneous three times a day before meals    MEDICATIONS  (PRN):  acetaminophen     Tablet .. 650 milliGRAM(s) Oral every 6 hours PRN Temp greater or equal to 38C (100.4F), Mild Pain (1 - 3), Moderate Pain (4 - 6)  aluminum hydroxide/magnesium hydroxide/simethicone Suspension 30 milliLiter(s) Oral every 4 hours PRN Dyspepsia  dextrose Oral Gel 15 Gram(s) Oral once PRN Blood Glucose LESS THAN 70 milliGRAM(s)/deciliter  melatonin 3 milliGRAM(s) Oral at bedtime PRN Insomnia      CAPILLARY BLOOD GLUCOSE      POCT Blood Glucose.: 101 mg/dL (30 May 2022 21:56)  POCT Blood Glucose.: 148 mg/dL (30 May 2022 17:10)  POCT Blood Glucose.: 140 mg/dL (30 May 2022 13:18)  POCT Blood Glucose.: 150 mg/dL (30 May 2022 08:14)    I&O's Summary    30 May 2022 07:01  -  31 May 2022 07:00  --------------------------------------------------------  IN: 780 mL / OUT: 1250 mL / NET: -470 mL        PHYSICAL EXAM:  Vital Signs Last 24 Hrs  T(C): 36.5 (31 May 2022 05:26), Max: 37.2 (30 May 2022 11:55)  T(F): 97.7 (31 May 2022 05:26), Max: 99 (30 May 2022 11:55)  HR: 67 (31 May 2022 05:26) (61 - 67)  BP: 141/59 (31 May 2022 05:26) (134/79 - 145/54)  BP(mean): --  RR: 18 (31 May 2022 05:26) (18 - 19)  SpO2: 97% (31 May 2022 05:26) (94% - 97%)    CONSTITUTIONAL: NAD, well-developed  RESPIRATORY: Normal respiratory effort; lungs are clear to auscultation bilaterally  CARDIOVASCULAR: Regular rate and rhythm, normal S1 and S2, no murmur/rub/gallop; No lower extremity edema; Peripheral pulses are 2+ bilaterally  ABDOMEN: Nontender to palpation, normoactive bowel sounds, no rebound/guarding; No hepatosplenomegaly  MUSCULOSKELETAL: no clubbing or cyanosis of digits; no joint swelling or tenderness to palpation  PSYCH: A+O to person, place, and time; affect appropriate    LABS:                        11.8   13.54 )-----------( 369      ( 30 May 2022 07:47 )             35.3     05-30    136  |  98  |  16  ----------------------------<  130<H>  3.9   |  27  |  0.92    Ca    8.7      30 May 2022 07:47  Phos  3.7     05-30  Mg     2.0     05-30    TPro  5.3<L>  /  Alb  2.5<L>  /  TBili  0.4  /  DBili  x   /  AST  18  /  ALT  11  /  AlkPhos  94  05-30                RADIOLOGY & ADDITIONAL TESTS:  Results Reviewed:   Imaging Personally Reviewed:  Electrocardiogram Personally Reviewed:    COORDINATION OF CARE:  Care Discussed with Consultants/Other Providers [Y/N]: Y  Prior or Outpatient Records Reviewed [Y/N]: Y   PROGRESS NOTE:     CONTACT INFO:  Du Landin MD | PGY-1  Pager: [phone] [place], [id number] [facility]  Also on TEAMS  After 7pm page night float  On weekends after 1pm check resident assignment tab to see who is covering      Patient is a 86y old  Female who presents with a chief complaint of Terminal Ileitis (30 May 2022 09:27)      SUBJECTIVE / OVERNIGHT EVENTS:    - No acute events overnight.   - No fevers/chills.  - Patient seen and evaluated at bedside.  - Denies SOB at rest, chest pain, palpitations, abdominal pain, nausea/vomiting    ADDITIONAL REVIEW OF SYSTEMS:    MEDICATIONS  (STANDING):  dextrose 5%. 1000 milliLiter(s) (100 mL/Hr) IV Continuous <Continuous>  dextrose 5%. 1000 milliLiter(s) (50 mL/Hr) IV Continuous <Continuous>  dextrose 50% Injectable 25 Gram(s) IV Push once  dextrose 50% Injectable 12.5 Gram(s) IV Push once  dextrose 50% Injectable 25 Gram(s) IV Push once  enoxaparin Injectable 40 milliGRAM(s) SubCutaneous every 24 hours  glucagon  Injectable 1 milliGRAM(s) IntraMuscular once  hydrocortisone 1% Cream 1 Application(s) Topical two times a day  insulin glargine Injectable (LANTUS) 10 Unit(s) SubCutaneous at bedtime  insulin lispro (ADMELOG) corrective regimen sliding scale   SubCutaneous three times a day before meals  insulin lispro (ADMELOG) corrective regimen sliding scale   SubCutaneous at bedtime  insulin lispro Injectable (ADMELOG) 4 Unit(s) SubCutaneous three times a day before meals    MEDICATIONS  (PRN):  acetaminophen     Tablet .. 650 milliGRAM(s) Oral every 6 hours PRN Temp greater or equal to 38C (100.4F), Mild Pain (1 - 3), Moderate Pain (4 - 6)  aluminum hydroxide/magnesium hydroxide/simethicone Suspension 30 milliLiter(s) Oral every 4 hours PRN Dyspepsia  dextrose Oral Gel 15 Gram(s) Oral once PRN Blood Glucose LESS THAN 70 milliGRAM(s)/deciliter  melatonin 3 milliGRAM(s) Oral at bedtime PRN Insomnia      CAPILLARY BLOOD GLUCOSE      POCT Blood Glucose.: 101 mg/dL (30 May 2022 21:56)  POCT Blood Glucose.: 148 mg/dL (30 May 2022 17:10)  POCT Blood Glucose.: 140 mg/dL (30 May 2022 13:18)  POCT Blood Glucose.: 150 mg/dL (30 May 2022 08:14)    I&O's Summary    30 May 2022 07:01  -  31 May 2022 07:00  --------------------------------------------------------  IN: 780 mL / OUT: 1250 mL / NET: -470 mL        PHYSICAL EXAM:  Vital Signs Last 24 Hrs  T(C): 36.5 (31 May 2022 05:26), Max: 37.2 (30 May 2022 11:55)  T(F): 97.7 (31 May 2022 05:26), Max: 99 (30 May 2022 11:55)  HR: 67 (31 May 2022 05:26) (61 - 67)  BP: 141/59 (31 May 2022 05:26) (134/79 - 145/54)  BP(mean): --  RR: 18 (31 May 2022 05:26) (18 - 19)  SpO2: 97% (31 May 2022 05:26) (94% - 97%)    CONSTITUTIONAL: NAD, age-appropriate   RESPIRATORY: Normal respiratory effort; lungs are clear to auscultation bilaterally  CARDIOVASCULAR: Regular rate and rhythm, normal S1 and S2, no murmur/rub/gallop; No lower extremity edema; Peripheral pulses are 2+ bilaterally  ABDOMEN: Improving tenderness in abdomen, normoactive bowel sounds  MUSCULOSKELETAL: no clubbing or cyanosis of digits; no joint swelling or tenderness to palpation  PSYCH: A+O to person, place, and time; affect appropriate  LABS:                        11.8   13.54 )-----------( 369      ( 30 May 2022 07:47 )             35.3     05-30    136  |  98  |  16  ----------------------------<  130<H>  3.9   |  27  |  0.92    Ca    8.7      30 May 2022 07:47  Phos  3.7     05-30  Mg     2.0     05-30    TPro  5.3<L>  /  Alb  2.5<L>  /  TBili  0.4  /  DBili  x   /  AST  18  /  ALT  11  /  AlkPhos  94  05-30                RADIOLOGY & ADDITIONAL TESTS:  Results Reviewed:   Imaging Personally Reviewed:  Electrocardiogram Personally Reviewed:    COORDINATION OF CARE:  Care Discussed with Consultants/Other Providers [Y/N]: Y  Prior or Outpatient Records Reviewed [Y/N]: Y

## 2022-05-31 NOTE — BH CONSULTATION LIAISON ASSESSMENT NOTE - CASE SUMMARY
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 She has no h/o psych illness or hospitalization. Psych is consulted for SI. Pt is cooperative and AOx3. She reports that she has suicidal thoughts once a week throughout her life but doesn't know why she has these thoughts and denies any triggering events. She states that she would "take a bottle of aspirin" if she decides to end her life, but has no plan to do this at this time. Denies h/o of suicidal attempts or FH of suicide. She states that she has great support systems from her friends and family. Pt endorses her mood is "normal." Denies any sleep disturbances, loss of interest in doing regular activities, changes in concentration or appetite. Denies substance abuse, chong, paranoid thoughts, hallucinations, active SI plans.   Consider starting Wellbutrin XL 150mg daily and Remeron 7.5mg po qhs
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
## 2022-05-31 NOTE — BH CONSULTATION LIAISON ASSESSMENT NOTE - CURRENT MEDICATION
MEDICATIONS  (STANDING):  dextrose 5%. 1000 milliLiter(s) (100 mL/Hr) IV Continuous <Continuous>  dextrose 5%. 1000 milliLiter(s) (50 mL/Hr) IV Continuous <Continuous>  dextrose 50% Injectable 25 Gram(s) IV Push once  dextrose 50% Injectable 12.5 Gram(s) IV Push once  dextrose 50% Injectable 25 Gram(s) IV Push once  enoxaparin Injectable 40 milliGRAM(s) SubCutaneous every 24 hours  glucagon  Injectable 1 milliGRAM(s) IntraMuscular once  hydrocortisone 1% Cream 1 Application(s) Topical two times a day  insulin glargine Injectable (LANTUS) 10 Unit(s) SubCutaneous at bedtime  insulin lispro (ADMELOG) corrective regimen sliding scale   SubCutaneous three times a day before meals  insulin lispro (ADMELOG) corrective regimen sliding scale   SubCutaneous at bedtime  insulin lispro Injectable (ADMELOG) 4 Unit(s) SubCutaneous three times a day before meals    MEDICATIONS  (PRN):  acetaminophen     Tablet .. 650 milliGRAM(s) Oral every 6 hours PRN Temp greater or equal to 38C (100.4F), Mild Pain (1 - 3), Moderate Pain (4 - 6)  aluminum hydroxide/magnesium hydroxide/simethicone Suspension 30 milliLiter(s) Oral every 4 hours PRN Dyspepsia  dextrose Oral Gel 15 Gram(s) Oral once PRN Blood Glucose LESS THAN 70 milliGRAM(s)/deciliter  melatonin 3 milliGRAM(s) Oral at bedtime PRN Insomnia

## 2022-05-31 NOTE — PROGRESS NOTE ADULT - ASSESSMENT
86F presenting w/ abd pain. Found to have inflammation and possible abscess in terminal ileum. No chronic GI symptoms at baseline.    Impression:  #Terminal ileitis: worsening on CT. Still favor infectious cause given onset but cannot rule out inflammatory or malignanct ileitus    Recommendations:  - can plan for colonoscopy tmro  - CLD diet  - NPO midnight  - COVID test today in prep for tmro  - bowel prep to be ordered by GI team  - trend CBC, CMP, INR and fever curve  - supportive care   86F presenting w/ abd pain. Found to have inflammation and possible abscess in terminal ileum. No chronic GI symptoms at baseline.    Impression:  #Terminal ileitis: worsening on CT. Still favor infectious cause given onset but cannot rule out inflammatory or malignanct ileitus. Ate ureña today so unable to perform colonoscopy tmro.    Recommendations:  - can plan for colonoscopy Thursday  - CLD diet tmro  - NPO midnight wedneday night  - COVID test in prep for procedure  - bowel prep to be ordered by GI team tmro  - trend CBC, CMP, INR and fever curve  - supportive care

## 2022-05-31 NOTE — PROGRESS NOTE ADULT - PROBLEM SELECTOR PLAN 1
i/s/o severe sepsis   - CT angio revealed distal terminal ileitis and c/f small intramural abscess  - surgery consulted; no surgical intervention warranted as abcess too small for percutaneous drainage   - GI consulted; f/u infectious work up. May consider colonoscopy if infectious work up negative  - c/w low residual diet  - CT abdomen scan demonstrate Worsening distal ileitis. No intramural abscess. Small loculated ascites.   - Place on clears in AM

## 2022-05-31 NOTE — PROGRESS NOTE ADULT - ASSESSMENT
86F w/ PMH T2DM who presents w/ abdominal and shoulder pain, found to have severe sepsis int he setting of terminal ileitis and possible intramural abscess as seen on CTA. Meckel scan negative. S/p CT scan, to consult surgery as to further management. Pending possible colonoscopy.  86F w/ PMH T2DM who presents w/ abdominal and shoulder pain, found to have severe sepsis int he setting of terminal ileitis and possible intramural abscess as seen on CTA. Meckel scan negative. S/p CT scan, to consult surgery as to further management. Colonoscopy deferred to Thursday

## 2022-05-31 NOTE — PROGRESS NOTE ADULT - SUBJECTIVE AND OBJECTIVE BOX
[facility] Progress Note     SUBJECTIVE: Pt seen and examined at bedside. There were no acute overnight events. She continues to have RLQ pain. She denies any CP, SOB, fever, chills, n/v/d          Vital Signs Last 24 Hrs  T(C): 36.5 (31 May 2022 05:26), Max: 36.9 (30 May 2022 20:06)  T(F): 97.7 (31 May 2022 05:26), Max: 98.4 (30 May 2022 20:06)  HR: 67 (31 May 2022 05:26) (64 - 67)  BP: 141/59 (31 May 2022 05:26) (134/79 - 141/59)  BP(mean): --  RR: 18 (31 May 2022 05:26) (18 - 18)  SpO2: 97% (31 May 2022 05:26) (95% - 97%)  I&O's Summary    30 May 2022 07:01  -  31 May 2022 07:00  --------------------------------------------------------  IN: 780 mL / OUT: 1250 mL / NET: -470 mL      I&O's Detail    30 May 2022 07:01  -  31 May 2022 07:00  --------------------------------------------------------  IN:    IV PiggyBack: 300 mL    Oral Fluid: 240 mL    Oral Fluid: 240 mL  Total IN: 780 mL    OUT:    Voided (mL): 1250 mL  Total OUT: 1250 mL    Total NET: -470 mL          Labs:                         11.9   17.12 )-----------( 381      ( 31 May 2022 07:15 )             36.6     05-31    139  |  100  |  14  ----------------------------<  89  3.8   |  27  |  0.80    Ca    8.7      31 May 2022 07:15  Phos  4.0     05-31  Mg     2.1     05-31    TPro  6.0  /  Alb  2.4<L>  /  TBili  0.4  /  DBili  x   /  AST  20  /  ALT  12  /  AlkPhos  101  05-31      Physical exam:  General; NAD  Respiratory: nonlabored breathing  Abdomen: soft, ND, RLQ TTP. No rebound or guarding  Extremities: WWP

## 2022-06-01 LAB
ALBUMIN SERPL ELPH-MCNC: 2.3 G/DL — LOW (ref 3.3–5)
ALP SERPL-CCNC: 105 U/L — SIGNIFICANT CHANGE UP (ref 40–120)
ALT FLD-CCNC: 10 U/L — SIGNIFICANT CHANGE UP (ref 10–45)
ANION GAP SERPL CALC-SCNC: 10 MMOL/L — SIGNIFICANT CHANGE UP (ref 5–17)
AST SERPL-CCNC: 19 U/L — SIGNIFICANT CHANGE UP (ref 10–40)
BILIRUB SERPL-MCNC: 0.4 MG/DL — SIGNIFICANT CHANGE UP (ref 0.2–1.2)
BLD GP AB SCN SERPL QL: POSITIVE — SIGNIFICANT CHANGE UP
BUN SERPL-MCNC: 12 MG/DL — SIGNIFICANT CHANGE UP (ref 7–23)
CALCIUM SERPL-MCNC: 8.5 MG/DL — SIGNIFICANT CHANGE UP (ref 8.4–10.5)
CHLORIDE SERPL-SCNC: 98 MMOL/L — SIGNIFICANT CHANGE UP (ref 96–108)
CO2 SERPL-SCNC: 30 MMOL/L — SIGNIFICANT CHANGE UP (ref 22–31)
CREAT SERPL-MCNC: 0.87 MG/DL — SIGNIFICANT CHANGE UP (ref 0.5–1.3)
EGFR: 65 ML/MIN/1.73M2 — SIGNIFICANT CHANGE UP
GLUCOSE BLDC GLUCOMTR-MCNC: 132 MG/DL — HIGH (ref 70–99)
GLUCOSE BLDC GLUCOMTR-MCNC: 154 MG/DL — HIGH (ref 70–99)
GLUCOSE BLDC GLUCOMTR-MCNC: 185 MG/DL — HIGH (ref 70–99)
GLUCOSE BLDC GLUCOMTR-MCNC: 218 MG/DL — HIGH (ref 70–99)
GLUCOSE SERPL-MCNC: 130 MG/DL — HIGH (ref 70–99)
HCT VFR BLD CALC: 35.1 % — SIGNIFICANT CHANGE UP (ref 34.5–45)
HGB BLD-MCNC: 11.3 G/DL — LOW (ref 11.5–15.5)
MAGNESIUM SERPL-MCNC: 2.1 MG/DL — SIGNIFICANT CHANGE UP (ref 1.6–2.6)
MCHC RBC-ENTMCNC: 31 PG — SIGNIFICANT CHANGE UP (ref 27–34)
MCHC RBC-ENTMCNC: 32.2 GM/DL — SIGNIFICANT CHANGE UP (ref 32–36)
MCV RBC AUTO: 96.2 FL — SIGNIFICANT CHANGE UP (ref 80–100)
NRBC # BLD: 0 /100 WBCS — SIGNIFICANT CHANGE UP (ref 0–0)
PHOSPHATE SERPL-MCNC: 3.6 MG/DL — SIGNIFICANT CHANGE UP (ref 2.5–4.5)
PLATELET # BLD AUTO: 441 K/UL — HIGH (ref 150–400)
POTASSIUM SERPL-MCNC: 3.7 MMOL/L — SIGNIFICANT CHANGE UP (ref 3.5–5.3)
POTASSIUM SERPL-SCNC: 3.7 MMOL/L — SIGNIFICANT CHANGE UP (ref 3.5–5.3)
PROT SERPL-MCNC: 5.8 G/DL — LOW (ref 6–8.3)
RBC # BLD: 3.65 M/UL — LOW (ref 3.8–5.2)
RBC # FLD: 12.4 % — SIGNIFICANT CHANGE UP (ref 10.3–14.5)
RH IG SCN BLD-IMP: POSITIVE — SIGNIFICANT CHANGE UP
SODIUM SERPL-SCNC: 138 MMOL/L — SIGNIFICANT CHANGE UP (ref 135–145)
WBC # BLD: 13.33 K/UL — HIGH (ref 3.8–10.5)
WBC # FLD AUTO: 13.33 K/UL — HIGH (ref 3.8–10.5)

## 2022-06-01 PROCEDURE — 99232 SBSQ HOSP IP/OBS MODERATE 35: CPT

## 2022-06-01 PROCEDURE — 86077 PHYS BLOOD BANK SERV XMATCH: CPT

## 2022-06-01 PROCEDURE — 99223 1ST HOSP IP/OBS HIGH 75: CPT

## 2022-06-01 RX ORDER — SOD SULF/SODIUM/NAHCO3/KCL/PEG
4000 SOLUTION, RECONSTITUTED, ORAL ORAL ONCE
Refills: 0 | Status: COMPLETED | OUTPATIENT
Start: 2022-06-01 | End: 2022-06-01

## 2022-06-01 RX ORDER — POTASSIUM CHLORIDE 20 MEQ
40 PACKET (EA) ORAL ONCE
Refills: 0 | Status: COMPLETED | OUTPATIENT
Start: 2022-06-01 | End: 2022-06-01

## 2022-06-01 RX ORDER — METRONIDAZOLE 500 MG
500 TABLET ORAL EVERY 12 HOURS
Refills: 0 | Status: DISCONTINUED | OUTPATIENT
Start: 2022-06-01 | End: 2022-06-06

## 2022-06-01 RX ORDER — POTASSIUM CHLORIDE 20 MEQ
40 PACKET (EA) ORAL ONCE
Refills: 0 | Status: DISCONTINUED | OUTPATIENT
Start: 2022-06-01 | End: 2022-06-01

## 2022-06-01 RX ORDER — CEFEPIME 1 G/1
1000 INJECTION, POWDER, FOR SOLUTION INTRAMUSCULAR; INTRAVENOUS EVERY 12 HOURS
Refills: 0 | Status: DISCONTINUED | OUTPATIENT
Start: 2022-06-01 | End: 2022-06-06

## 2022-06-01 RX ADMIN — Medication 1 APPLICATION(S): at 17:14

## 2022-06-01 RX ADMIN — Medication 1: at 09:06

## 2022-06-01 RX ADMIN — BUPROPION HYDROCHLORIDE 150 MILLIGRAM(S): 150 TABLET, EXTENDED RELEASE ORAL at 13:16

## 2022-06-01 RX ADMIN — MIRTAZAPINE 7.5 MILLIGRAM(S): 45 TABLET, ORALLY DISINTEGRATING ORAL at 21:04

## 2022-06-01 RX ADMIN — Medication 4 UNIT(S): at 09:07

## 2022-06-01 RX ADMIN — Medication 40 MILLIEQUIVALENT(S): at 17:09

## 2022-06-01 RX ADMIN — INSULIN GLARGINE 5 UNIT(S): 100 INJECTION, SOLUTION SUBCUTANEOUS at 21:59

## 2022-06-01 RX ADMIN — Medication 4000 MILLILITER(S): at 18:38

## 2022-06-01 RX ADMIN — Medication 1: at 18:39

## 2022-06-01 RX ADMIN — CEFEPIME 100 MILLIGRAM(S): 1 INJECTION, POWDER, FOR SOLUTION INTRAMUSCULAR; INTRAVENOUS at 19:52

## 2022-06-01 RX ADMIN — PIPERACILLIN AND TAZOBACTAM 25 GRAM(S): 4; .5 INJECTION, POWDER, LYOPHILIZED, FOR SOLUTION INTRAVENOUS at 13:19

## 2022-06-01 RX ADMIN — Medication 4 UNIT(S): at 13:14

## 2022-06-01 RX ADMIN — Medication 3 MILLIGRAM(S): at 21:11

## 2022-06-01 RX ADMIN — Medication 4 UNIT(S): at 18:39

## 2022-06-01 RX ADMIN — ENOXAPARIN SODIUM 40 MILLIGRAM(S): 100 INJECTION SUBCUTANEOUS at 17:11

## 2022-06-01 RX ADMIN — Medication 100 MILLIGRAM(S): at 19:52

## 2022-06-01 RX ADMIN — PIPERACILLIN AND TAZOBACTAM 25 GRAM(S): 4; .5 INJECTION, POWDER, LYOPHILIZED, FOR SOLUTION INTRAVENOUS at 05:17

## 2022-06-01 RX ADMIN — Medication 1 APPLICATION(S): at 05:17

## 2022-06-01 RX ADMIN — Medication 2: at 13:12

## 2022-06-01 NOTE — DIETITIAN INITIAL EVALUATION ADULT - MALNUTRITION
How Severe Is Your Skin Lesion?: mild Has Your Skin Lesion Been Treated?: not been treated Is This A New Presentation, Or A Follow-Up?: Skin Lesion Moderate protein-calorie malnutrition

## 2022-06-01 NOTE — DIETITIAN INITIAL EVALUATION ADULT - EDUCATION DIETARY MODIFICATIONS
Education provided to son and patient at bedside on ways to optimize intake once diet advanced. Amenable to trial of Glucerna supplements in-house to optimize intake. Low fiber diet education provided and discussed foods recommended and foods to avoid to limit inflammation and GI distress. Son amenable to recommendations, made aware RD remains available./(1) partially meets; needs review/practice/verbalization

## 2022-06-01 NOTE — PROGRESS NOTE ADULT - ATTENDING COMMENTS
Patient is an 86 y.o F w/ PMH T2DM who presents w/ abdominal pain, found to have severe sepsis int he setting of terminal ileitis, course c/b worsening RUQ pain, found to have worsening terminal ileitis on repeat CT abd/pelvis. Pending colonoscopy tomorrow     #Terminal ileitis  -Initially admitted w/ severe sepsis likely 2/2 terminal ileitis as seen on imaging; UA and BCX NGTD  -CT angio revealed distal terminal ileitis and c/f small intramural abscess; course c/b worsening RUQ pain and so repeat CT abd/pelvis performed-demonstrated worsening terminal ileitis, no intramural abscess noted   -Stool culture negative; however, obtained after several days of abx   -unclear if infections vs. inflammatory vs. ischemic, pending colonoscopy tomorrow for possible TI biopsy   -CLD, NPO after MN, GI to order prep  -surgery consulted; following  -c/w zosyn for now    #RUQ pain   -Patient continues to endorse RUQ pain, unclear etiology   -RUQ U/S revealed gall bladder sludge, some edema in gall bladder well in the setting of ascites   -Meckel scan (5/27) negative for ectopic tissue  -Repeat CT abd/pelvis w/ worsening terminal ileitis  -see plan as above     #Depression  -Patient endorsed depression and passive SI   -Behavioral health consulted  -started on     #T2DM  -Hx of T2DM, home medicaitons include prandin and amaryl, had been on metformin but unclear why discontinued   -lss, monitor FS before meals and at bedtime  -carb consistent diet   -c/w lantus 5 U qhs and admelog 4 U TID premeal (basal halved as will be NPO after MN)  -A1c 9.3%, will likely need to start insulin therapy and f/u outpatient endocrine     Dispo: pending colonoscopy Thursday  PT recommend home w/ home PT.

## 2022-06-01 NOTE — DIETITIAN INITIAL EVALUATION ADULT - NSFNSGIIOFT_GEN_A_CORE
Pt denies nausea/vomiting. Ordered for Golytely for colonoscopy prep. Last documented BM yesterday 5/31.

## 2022-06-01 NOTE — DIETITIAN INITIAL EVALUATION ADULT - ADD RECOMMEND
1) As medically feasible, recommend advancing diet to: Consistent Carbohydrate, Low Fiber; adjust as needed  2) Suggest multivitamin supplementation if no medical contraindications   3) Monitor PO intake, weight, labs, skin, GI status, diet   4) Malnutrition sticker placed, RD to follow-up as per protocol

## 2022-06-01 NOTE — DIETITIAN INITIAL EVALUATION ADULT - ORAL NUTRITION SUPPLEMENTS
Once advanced from clear liquids, recommend adding Glucerna 2 daily (220 kcal, 10 g protein in each) to optimize intake

## 2022-06-01 NOTE — DIETITIAN INITIAL EVALUATION ADULT - NS FNS WEIGHT CHANGE REASON
Son reports patient's weight loss was unintentional, however patient noted to consume less snacks and therefore with weight loss. Son also notes he does not feel that entirety of weight loss was from decreased snacking. ?intentional vs. unintentional weight loss.

## 2022-06-01 NOTE — DIETITIAN INITIAL EVALUATION ADULT - REASON
Pt/family declined at this time, patient wishing to rest  Observed moderate muscle loss to temples and clavicle region

## 2022-06-01 NOTE — PROGRESS NOTE ADULT - PROBLEM SELECTOR PLAN 5
- Patient verbalized that she "just wants to end it"  - Expresses sadness that friends and family have either  or moved away   - Denies plan   - Psych consulted, appreciate recs   - started remeron 7.5mg PO Qhs   - started wellbutryn XL 150mg PO Qd - Patient verbalized that she "just wants to end it"  - Expresses sadness that friends and family have either  or moved away   - Thought about overdosing on medications   - Psych consulted, appreciate recs   - c/w remeron 7.5mg PO Qhs   - c/w wellbutryn XL 150mg PO Qd

## 2022-06-01 NOTE — DIETITIAN INITIAL EVALUATION ADULT - REASON INDICATOR FOR ASSESSMENT
Length of stay assessment  Source: EMR, patient, patient's son at bedside  (Pt falling asleep throughout interview, forgetful, majority of subjective information obtained from son)

## 2022-06-01 NOTE — PROGRESS NOTE ADULT - ASSESSMENT
86F w/ PMH T2DM who presents w/ abdominal and shoulder pain, found to have severe sepsis int he setting of terminal ileitis and possible intramural abscess as seen on CTA. Meckel scan negative. CT5/28 shows worsening ileitis but no intramural abscess.     Plan:  - Trend WBC (uptrending)  - GI to scope tomorrow, will follow up findings  - recommend CT A/P on Friday 6/3   - possible OR on Saturday depending on findings of CT A/P  - continue abx  - pain control  - rest of care per primary team     [facility]   [id number]

## 2022-06-01 NOTE — DIETITIAN INITIAL EVALUATION ADULT - ORAL INTAKE PTA/DIET HISTORY
Per son, patient with poor PO intake on day of admission in setting of abdominal pain however eating "normally" prior to pain onset. Per son, patient has same meal pattern every day: granola bar for breakfast with oral nutrition supplement (unable to recall name), and "normal dinner." Notes patient has decreased PO intake x 6 months, has stopped eating snacks between meals.

## 2022-06-01 NOTE — DIETITIAN INITIAL EVALUATION ADULT - NSFNSADHERENCEPTAFT_GEN_A_CORE
Per chart, patient taking metformin for glycemic control PTA. HbA1c 9.3% suggests fair glycemic control in setting of advanced age.

## 2022-06-01 NOTE — PROGRESS NOTE ADULT - ASSESSMENT
86F w/ PMH T2DM who presents w/ abdominal and shoulder pain, found to have severe sepsis int he setting of terminal ileitis and possible intramural abscess as seen on CTA. Meckel scan negative. S/p CT scan, to consult surgery as to further management. Colonoscopy deferred to Thursday

## 2022-06-01 NOTE — PROGRESS NOTE ADULT - SUBJECTIVE AND OBJECTIVE BOX
Interval Events:   No acute events overnight.  Patient endorsing some abdominal pain.    ROS:   12 point review of systems performed and negative except otherwise noted in HPI.    Hospital Medications:  acetaminophen     Tablet .. 650 milliGRAM(s) Oral every 6 hours PRN  aluminum hydroxide/magnesium hydroxide/simethicone Suspension 30 milliLiter(s) Oral every 4 hours PRN  buPROPion XL (24-Hour) . 150 milliGRAM(s) Oral daily  dextrose 5%. 1000 milliLiter(s) IV Continuous <Continuous>  dextrose 5%. 1000 milliLiter(s) IV Continuous <Continuous>  dextrose 50% Injectable 25 Gram(s) IV Push once  dextrose 50% Injectable 12.5 Gram(s) IV Push once  dextrose 50% Injectable 25 Gram(s) IV Push once  dextrose Oral Gel 15 Gram(s) Oral once PRN  enoxaparin Injectable 40 milliGRAM(s) SubCutaneous every 24 hours  glucagon  Injectable 1 milliGRAM(s) IntraMuscular once  hydrocortisone 1% Cream 1 Application(s) Topical two times a day  insulin glargine Injectable (LANTUS) 5 Unit(s) SubCutaneous at bedtime  insulin lispro (ADMELOG) corrective regimen sliding scale   SubCutaneous three times a day before meals  insulin lispro (ADMELOG) corrective regimen sliding scale   SubCutaneous at bedtime  insulin lispro Injectable (ADMELOG) 4 Unit(s) SubCutaneous three times a day before meals  melatonin 3 milliGRAM(s) Oral at bedtime PRN  mirtazapine 7.5 milliGRAM(s) Oral at bedtime  piperacillin/tazobactam IVPB.. 3.375 Gram(s) IV Intermittent every 8 hours      PHYSICAL EXAM:   Vital Signs:  Vital Signs Last 24 Hrs  T(C): 36.4 (2022 04:53), Max: 36.8 (31 May 2022 21:04)  T(F): 97.5 (2022 04:53), Max: 98.2 (31 May 2022 21:04)  HR: 70 (2022 04:53) (70 - 79)  BP: 147/55 (2022 04:53) (103/52 - 154/72)  BP(mean): --  RR: 18 (2022 04:53) (18 - 18)  SpO2: 95% (2022 04:53) (91% - 95%)  Daily     Daily Weight in k.5 (2022 08:19)    GENERAL: no acute distress  NEURO: alert  HEENT: NCAT, no conjunctival pallor appreciated  CHEST: no respiratory distress, no accessory muscle use  CARDIAC: regular rate, +S1/S2  ABDOMEN: soft, nondistended, nontender, no rebound or guarding  EXTREMITIES: warm, well perfused  SKIN: no lesions noted    LABS: reviewed                        11.3   13.33 )-----------( 441      ( 2022 07:12 )             35.1     06-    138  |  98  |  12  ----------------------------<  130<H>  3.7   |  30  |  0.87    Ca    8.5      2022 07:12  Phos  3.6     06-  Mg     2.1     06-    TPro  5.8<L>  /  Alb  2.3<L>  /  TBili  0.4  /  DBili  x   /  AST  19  /  ALT  10  /  AlkPhos  105  06-01    LIVER FUNCTIONS - ( 2022 07:12 )  Alb: 2.3 g/dL / Pro: 5.8 g/dL / ALK PHOS: 105 U/L / ALT: 10 U/L / AST: 19 U/L / GGT: x             Interval Diagnostic Studies: see sunrise for full report

## 2022-06-01 NOTE — CONSULT NOTE ADULT - SUBJECTIVE AND OBJECTIVE BOX
Patient is a 86y old  Female who presents with a chief complaint of Pt is an 87 yo female with PMH of DM who presented with 1 day of abdominal pain. Admitted 5/24/22. Found to have severe sepsis in the setting of terminal ileitis and possible intramural abscess.      (01 Jun 2022 14:39)    HPI:  86W with h/o DM  5/25/22: Hgb A1C = 9.3%), obesity (BMI = 36.2) admitted 5/24/22 with one day of abdominal pain. This pain was exacerbated when she reclined on her Lazy-Boy chair. She did not take any pain relieving medications. She denies fevers or chills during this time. She also endorsed a shoulder pain that she endorses has been chronic. Denies fever, chills, n/v/d. Had a previous colonoscopy but unclear as to result.     ED course: Patient with elevated T at 101F and tachycardia at 120. She received 1L bolus with improvement in HR. Received Vanco x1 and zosyn x1. CTAP demonstrated findings of distal/terminal ileitis with concern for small intramural abscess. Lactate downtrending. Surgery stated no acute surgical intervention indicated given that abscess too small for drainage. Recommended GI consult and continued antibiotics.     (24 May 2022 07:16)  Today, she notes feeing exhausted. She is not eating. Has not passed stool.    PAST MEDICAL & SURGICAL HISTORY:  Diabetes      S/P TKR (total knee replacement)    FAMILY HISTORY:  Family history of breast cancer in mother (Mother)        REVIEW OF SYSTEMS:  CONSTITUTIONAL:+ weakness, fevers or chills  EYES/ENT: No visual changes;  No vertigo or throat pain   NECK: No pain or stiffness  RESPIRATORY: No cough, wheezing, hemoptysis; No shortness of breath  CARDIOVASCULAR: No chest pain or palpitations  GASTROINTESTINAL: No abdominal or epigastric pain today. No nausea, vomiting, or hematemesis; No diarrhea or constipation. No melena or hematochezia.  GENITOURINARY: No dysuria, frequency or hematuria  NEUROLOGICAL: No numbness or weakness  SKIN: No itching, burning, rashes, or lesions   All other review of systems is negative unless indicated above    Allergies  No Known Allergies    Antimicrobials:  piperacillin/tazobactam IVPB.. 3.375 Gram(s) IV Intermittent every 8 hours      Vital Signs Last 24 Hrs  T(C): 37.1 (01 Jun 2022 12:05), Max: 37.1 (01 Jun 2022 12:05)  T(F): 98.8 (01 Jun 2022 12:05), Max: 98.8 (01 Jun 2022 12:05)  HR: 85 (01 Jun 2022 12:23) (70 - 85)  BP: 132/68 (01 Jun 2022 12:23) (109/71 - 154/72)  BP(mean): --  RR: 18 (01 Jun 2022 12:23) (18 - 18)  SpO2: 94% (01 Jun 2022 12:23) (93% - 95%)    PHYSICAL EXAM:  General: ill appearing NAD, Non-toxic  Neurology: A&Ox3, faigued  Respiratory: Clear to auscultation bilaterally  CV: RRR, S1S2, no murmurs, rubs or gallops  Abdominal: Soft, Non-tender, non-distended, normal bowel sounds  Extremities: No edema  Line Sites: Clear  Skin: No rash                        11.3   13.33 )-----------( 441      ( 01 Jun 2022 07:12 )             35.1   WBC Count: 13.33 (06-01 @ 07:12)  WBC Count: 17.12 (05-31 @ 07:15)  WBC Count: 13.54 (05-30 @ 07:47)  WBC Count: 13.62 (05-29 @ 07:30)  WBC Count: 13.55 (05-28 @ 06:21)      06-01    138  |  98  |  12  ----------------------------<  130<H>  3.7   |  30  |  0.87    Ca    8.5      01 Jun 2022 07:12  Phos  3.6     06-01  Mg     2.1     06-01    TPro  5.8<L>  /  Alb  2.3<L>  /  TBili  0.4  /  DBili  x   /  AST  19  /  ALT  10  /  AlkPhos  105  06-01      MICROBIOLOGY:  .Stool Feces  05-26-22   No enteric pathogens isolated.  (Stool culture examined for Salmonella,  Shigella, Campylobacter, Aeromonas, Plesiomonas,  Vibrio, E.coli O157 and Yersinia)  --  --    .Blood Blood-Venous  05-24-22   No Growth Final  --  --      Clean Catch Clean Catch (Midstream)  05-23-22   No growth  --  --      .Blood Blood-Peripheral  05-23-22   Growth in aerobic bottle: Staphylococcus hominis  Coag Negative Staphylococcus  Single set isolate, possible contaminant.       .Blood Blood-Peripheral  05-23-22   No Growth Final  --  --    (05.26.22 @ 17:07)   Specimen Source: .Stool Feces   Culture Results:   GI PCR Results: NOT detected    Radiology:  images independently viewed  < from: CT Abdomen and Pelvis w/ Oral Cont and w/ IV Cont (05.28.22 @ 18:54) >  FINDINGS:  LOWER CHEST: Coronary artery calcifications. Small hiatal hernia.    LIVER: Within normal limits.  BILE DUCTS: Normal caliber.  GALLBLADDER: Layering sludge.  SPLEEN: Within normal limits.  PANCREAS: Calcifications in the pancreatic body.  ADRENALS: Unchanged thickened adrenal glands.  KIDNEYS/URETERS: Right peripelvic cyst. Trace bilateral perinephric fluid.    BLADDER: Small bubble of air in the bladder.  REPRODUCTIVE ORGANS: Hysterectomy.    BOWEL: Mildly hypoenhancing and thickened wall of the distal ileum with   mesenteric edema, findings likely representing worsening ileitis. No   intramural abscess. No bowel obstruction. Appendix is normal.  PERITONEUM: Trace ascites, partially loculated within mesentery. No   abscess.  VESSELS: The mesenteric vessels are patent. Atherosclerotic changes.  RETROPERITONEUM/LYMPH NODES: No lymphadenopathy.  ABDOMINAL WALL: Subcutaneous edema in the right anterolateral abdominal   wall. Small bubbles of air in the anterior abdominal wall (3:120), likely   secondary to subcutaneous injections.  BONES: Degenerative changes. Grade 1 anterolisthesis of L4 on L5.    IMPRESSION:  Worsening distal ileitis. No intramural abscess.    Small loculated ascites.    < end of copied text >  < from: NM Meckel's Scan (05.27.22 @ 09:27) >  IMPRESSION:  No radionuclide evidence of functioning ectopic gastric   mucosa.    < end of copied text >  < from: US Abdomen Upper Quadrant Right (05.26.22 @ 10:31) >  IMPRESSION:  Sludge in the gallbladder with nonspecificgallbladder wall thickening in   the setting of ascites.    < end of copied text >  < from: CT Angio Chest Aorta w/wo IV Cont (05.23.22 @ 21:04) >  ABDOMEN AND PELVIS:  LIVER: Within normal limits.  BILE DUCTS: Normal caliber.  GALLBLADDER: Within normal limits.  SPLEEN: Within normal limits.  PANCREAS: Within normal limits.  ADRENALS: Thickened adrenal glands, unchanged.  KIDNEYS/URETERS: Right peripelvic renal cyst. Mild nonspecific bilateral   perinephric fat stranding. No hydronephrosis.    BLADDER: Within normal limits.  REPRODUCTIVE ORGANS: Hysterectomy.    BOWEL: There is wall thickening and inflammatory changes involving the   distal through the terminal ileum with a low-attenuation peripherally   enhancing intramural structure measuring at least 1.2 x 1.1 cm best seen   on 5:203, concerning for an abscess. Moderate-sized hiatal hernia. No   bowel obstruction. Appendix is normal. Scattered sigmoid colon   diverticula without evidence for diverticulitis.  PERITONEUM: Trace free fluid in the pelvis.  VESSELS: No penetrating aortic ulcer or aortic dissection. No aneurysm.   Atherosclerotic calcifications of the aortoiliac system. Severe   atherosclerotic narrowing at the celiac and superior mesenteric artery   origins. Mild narrowing at the bilateral renal and inferior mesenteric   artery origins. Otherwise, the vessels are patent.  RETROPERITONEUM/LYMPH NODES: No lymphadenopathy.  ABDOMINAL WALL: Within normal limits.  BONES: Degenerative changes.    IMPRESSION:    No CT evidence for acute aortic syndrome.    Findings of distal/terminal ileitis with concern for small intramural   abscess, as described above.    < end of copied text >      Benson Mcgraw MD; Division of Infectious Disease; Pager: [phone]; nights and weekends: [phone]

## 2022-06-01 NOTE — PROGRESS NOTE ADULT - PROBLEM SELECTOR PLAN 2
Admitted with severe sepsis (Tmax 101.2, leukocytosis, tachycardic to 116-123, 15.3% bands, lactate to 3.7); s/p total 1.5 L   - CT angio revealed distal terminal ileitis and concern for small intramural abscess   - CXR clear, UA negative, pending BCX  - GI PCR (-)tive  - f/u C. diff   - c/w zosyn (5/24 - )  - BCx 5/23 grew 1 out of 2 bottles CoNS; repeat BCx 4/24 NGTD, likely contaminant, will not treat Admitted with severe sepsis (Tmax 101.2, leukocytosis, tachycardic to 116-123, 15.3% bands, lactate to 3.7); s/p total 1.5 L   - CT angio revealed distal terminal ileitis and concern for small intramural abscess   - CXR clear, UA negative, pending BCX  - GI PCR (-)tive  - f/u C. diff   - c/w zosyn (5/24 - )  - To discontinue zosyn after colonoscopy   - BCx 5/23 grew 1 out of 2 bottles CoNS; repeat BCx 4/24 NGTD, likely contaminant, will not treat

## 2022-06-01 NOTE — PROGRESS NOTE ADULT - PROBLEM SELECTOR PLAN 4
DVT prophylaxis: lovenox daily   Diet: liquid diet   Dispo: PT recommend Home PT   HTN: bps elevated to sbp 140s, likely undiagnosed HTN. Will continue to monitor for now at this time DVT prophylaxis: lovenox daily   Diet: liquid diet and NPO@MN  Dispo: PT recommend Home PT

## 2022-06-01 NOTE — PROGRESS NOTE ADULT - PROBLEM SELECTOR PLAN 1
i/s/o severe sepsis   - CT angio revealed distal terminal ileitis and c/f small intramural abscess  - surgery consulted; no surgical intervention warranted as abcess too small for percutaneous drainage   - GI consulted; f/u infectious work up. May consider colonoscopy if infectious work up negative  - c/w low residual diet  - CT abdomen scan demonstrate Worsening distal ileitis. No intramural abscess. Small loculated ascites.   - Place on clears in AM i/s/o severe sepsis   - CT angio revealed distal terminal ileitis and c/f small intramural abscess  - surgery consulted; no surgical intervention warranted as abcess too small for percutaneous drainage   - GI consulted; f/u infectious work up. May consider colonoscopy if infectious work up negative  - c/w low residual diet  - CT abdomen scan demonstrate worsening distal ileitis. No intramural abscess. Small loculated ascites.   - repeat CT abdomen on Friday, pending OR depending on results of colonoscopy    - NPO@MN i/s/o severe sepsis   - CT angio revealed distal terminal ileitis and c/f small intramural abscess  - surgery consulted; possible surgery depending on results of colonoscopy   - GI consulted; colonoscopy tomorrow  - CT abdomen scan demonstrate worsening distal ileitis. No intramural abscess. Small loculated ascites.   - repeat CT abdomen on Friday, pending OR depending on results of colonoscopy    - Lactate (suspicious for ischemia) and coags in AM  - NPO@MN

## 2022-06-01 NOTE — DIETITIAN INITIAL EVALUATION ADULT - PERTINENT MEDS FT
MEDICATIONS  (STANDING):  buPROPion XL (24-Hour) . 150 milliGRAM(s) Oral daily  dextrose 5%. 1000 milliLiter(s) (100 mL/Hr) IV Continuous <Continuous>  dextrose 5%. 1000 milliLiter(s) (50 mL/Hr) IV Continuous <Continuous>  dextrose 50% Injectable 25 Gram(s) IV Push once  dextrose 50% Injectable 12.5 Gram(s) IV Push once  dextrose 50% Injectable 25 Gram(s) IV Push once  enoxaparin Injectable 40 milliGRAM(s) SubCutaneous every 24 hours  glucagon  Injectable 1 milliGRAM(s) IntraMuscular once  hydrocortisone 1% Cream 1 Application(s) Topical two times a day  insulin glargine Injectable (LANTUS) 5 Unit(s) SubCutaneous at bedtime  insulin lispro (ADMELOG) corrective regimen sliding scale   SubCutaneous three times a day before meals  insulin lispro (ADMELOG) corrective regimen sliding scale   SubCutaneous at bedtime  insulin lispro Injectable (ADMELOG) 4 Unit(s) SubCutaneous three times a day before meals  mirtazapine 7.5 milliGRAM(s) Oral at bedtime  piperacillin/tazobactam IVPB.. 3.375 Gram(s) IV Intermittent every 8 hours  polyethylene glycol/electrolyte Solution. 4000 milliLiter(s) Oral once  potassium chloride    Tablet ER 40 milliEquivalent(s) Oral once    MEDICATIONS  (PRN):  acetaminophen     Tablet .. 650 milliGRAM(s) Oral every 6 hours PRN Temp greater or equal to 38C (100.4F), Mild Pain (1 - 3), Moderate Pain (4 - 6)  aluminum hydroxide/magnesium hydroxide/simethicone Suspension 30 milliLiter(s) Oral every 4 hours PRN Dyspepsia  dextrose Oral Gel 15 Gram(s) Oral once PRN Blood Glucose LESS THAN 70 milliGRAM(s)/deciliter  melatonin 3 milliGRAM(s) Oral at bedtime PRN Insomnia

## 2022-06-01 NOTE — DIETITIAN INITIAL EVALUATION ADULT - ETIOLOGY
related to suboptimal glycemic control related to inadequate protein-energy intake in setting of acute illness (ileitis) and decreased appetite in setting of advanced age

## 2022-06-01 NOTE — DIETITIAN INITIAL EVALUATION ADULT - SIGNS/SYMPTOMS
as evidenced by decreased PO intake x >1 week, >10% wt loss x 6 months, muscle loss as evidenced by HbA1c 9.3%, elevated glucose fingersticks

## 2022-06-01 NOTE — PROGRESS NOTE ADULT - SUBJECTIVE AND OBJECTIVE BOX
Green Team Surgery Progress Note    SUBJECTIVE:  Patient seen and examined at bedside with surgical team.   No acute events overnight.   Sleeping comfortable this morning.     OBJECTIVE:    Vital Signs Last 24 Hrs  T(C): 36.4 (01 Jun 2022 04:53), Max: 36.8 (31 May 2022 21:04)  T(F): 97.5 (01 Jun 2022 04:53), Max: 98.2 (31 May 2022 21:04)  HR: 70 (01 Jun 2022 04:53) (70 - 79)  BP: 147/55 (01 Jun 2022 04:53) (103/52 - 154/72)  BP(mean): --  RR: 18 (01 Jun 2022 04:53) (18 - 18)  SpO2: 95% (01 Jun 2022 04:53) (91% - 95%)    MEDICATIONS  (STANDING):  buPROPion XL (24-Hour) . 150 milliGRAM(s) Oral daily  dextrose 5%. 1000 milliLiter(s) (100 mL/Hr) IV Continuous <Continuous>  dextrose 5%. 1000 milliLiter(s) (50 mL/Hr) IV Continuous <Continuous>  dextrose 50% Injectable 25 Gram(s) IV Push once  dextrose 50% Injectable 12.5 Gram(s) IV Push once  dextrose 50% Injectable 25 Gram(s) IV Push once  enoxaparin Injectable 40 milliGRAM(s) SubCutaneous every 24 hours  glucagon  Injectable 1 milliGRAM(s) IntraMuscular once  hydrocortisone 1% Cream 1 Application(s) Topical two times a day  insulin glargine Injectable (LANTUS) 5 Unit(s) SubCutaneous at bedtime  insulin lispro (ADMELOG) corrective regimen sliding scale   SubCutaneous three times a day before meals  insulin lispro (ADMELOG) corrective regimen sliding scale   SubCutaneous at bedtime  insulin lispro Injectable (ADMELOG) 4 Unit(s) SubCutaneous three times a day before meals  mirtazapine 7.5 milliGRAM(s) Oral at bedtime  piperacillin/tazobactam IVPB.. 3.375 Gram(s) IV Intermittent every 8 hours    MEDICATIONS  (PRN):  acetaminophen     Tablet .. 650 milliGRAM(s) Oral every 6 hours PRN Temp greater or equal to 38C (100.4F), Mild Pain (1 - 3), Moderate Pain (4 - 6)  aluminum hydroxide/magnesium hydroxide/simethicone Suspension 30 milliLiter(s) Oral every 4 hours PRN Dyspepsia  dextrose Oral Gel 15 Gram(s) Oral once PRN Blood Glucose LESS THAN 70 milliGRAM(s)/deciliter  melatonin 3 milliGRAM(s) Oral at bedtime PRN Insomnia      Physical exam:  General; NAD  Respiratory: nonlabored breathing  Abdomen: soft, ND, minimal RLQ TTP. No rebound or guarding  Extremities: [facility]       LABS:                        11.3   13.33 )-----------( 441      ( 01 Jun 2022 07:12 )             35.1     06-01    138  |  98  |  12  ----------------------------<  130<H>  3.7   |  30  |  0.87    Ca    8.5      01 Jun 2022 07:12  Phos  3.6     06-01  Mg     2.1     06-01    TPro  5.8<L>  /  Alb  2.3<L>  /  TBili  0.4  /  DBili  x   /  AST  19  /  ALT  10  /  AlkPhos  105  06-01      LIVER FUNCTIONS - ( 01 Jun 2022 07:12 )  Alb: 2.3 g/dL / Pro: 5.8 g/dL / ALK PHOS: 105 U/L / ALT: 10 U/L / AST: 19 U/L / GGT: x

## 2022-06-01 NOTE — PROGRESS NOTE ADULT - ATTENDING COMMENTS
No abdominal pain currently  Still with gas, BM  clear liquid diet    For colonoscopy with TI inspection/bx tomm AM

## 2022-06-01 NOTE — DIETITIAN INITIAL EVALUATION ADULT - ENERGY INTAKE
Pt reports poor appetite in-house however unable to recall quantity of meals consumed. Per flowsheets, patient with fair PO intake of meals in-house. Currently on Clear Liquid diet as planned for colonoscopy tomorrow./Fair (50-75%)

## 2022-06-01 NOTE — PROGRESS NOTE ADULT - SUBJECTIVE AND OBJECTIVE BOX
PROGRESS NOTE:     CONTACT INFO:  Du Landin MD | PGY-1  Pager: [phone] [place], [phone] [facility]  Also on TEAMS  After 7pm page night float  On weekends after 1pm check resident assignment tab to see who is covering      Patient is a 86y old  Female who presents with a chief complaint of Terminal Ileitis (31 May 2022 12:29)      SUBJECTIVE / OVERNIGHT EVENTS:    - No acute events overnight.   - No fevers/chills.  - Patient seen and evaluated at bedside.  - Denies SOB at rest, chest pain, palpitations, abdominal pain, nausea/vomiting    ADDITIONAL REVIEW OF SYSTEMS:    MEDICATIONS  (STANDING):  buPROPion XL (24-Hour) . 150 milliGRAM(s) Oral daily  dextrose 5%. 1000 milliLiter(s) (100 mL/Hr) IV Continuous <Continuous>  dextrose 5%. 1000 milliLiter(s) (50 mL/Hr) IV Continuous <Continuous>  dextrose 50% Injectable 25 Gram(s) IV Push once  dextrose 50% Injectable 12.5 Gram(s) IV Push once  dextrose 50% Injectable 25 Gram(s) IV Push once  enoxaparin Injectable 40 milliGRAM(s) SubCutaneous every 24 hours  glucagon  Injectable 1 milliGRAM(s) IntraMuscular once  hydrocortisone 1% Cream 1 Application(s) Topical two times a day  insulin glargine Injectable (LANTUS) 5 Unit(s) SubCutaneous at bedtime  insulin lispro (ADMELOG) corrective regimen sliding scale   SubCutaneous three times a day before meals  insulin lispro (ADMELOG) corrective regimen sliding scale   SubCutaneous at bedtime  insulin lispro Injectable (ADMELOG) 4 Unit(s) SubCutaneous three times a day before meals  mirtazapine 7.5 milliGRAM(s) Oral at bedtime  piperacillin/tazobactam IVPB.. 3.375 Gram(s) IV Intermittent every 8 hours    MEDICATIONS  (PRN):  acetaminophen     Tablet .. 650 milliGRAM(s) Oral every 6 hours PRN Temp greater or equal to 38C (100.4F), Mild Pain (1 - 3), Moderate Pain (4 - 6)  aluminum hydroxide/magnesium hydroxide/simethicone Suspension 30 milliLiter(s) Oral every 4 hours PRN Dyspepsia  dextrose Oral Gel 15 Gram(s) Oral once PRN Blood Glucose LESS THAN 70 milliGRAM(s)/deciliter  melatonin 3 milliGRAM(s) Oral at bedtime PRN Insomnia      CAPILLARY BLOOD GLUCOSE      POCT Blood Glucose.: 174 mg/dL (31 May 2022 21:39)  POCT Blood Glucose.: 129 mg/dL (31 May 2022 17:35)  POCT Blood Glucose.: 97 mg/dL (31 May 2022 13:17)  POCT Blood Glucose.: 170 mg/dL (31 May 2022 12:00)  POCT Blood Glucose.: 95 mg/dL (31 May 2022 08:25)    I&O's Summary    31 May 2022 07:01  -  01 Jun 2022 07:00  --------------------------------------------------------  IN: 900 mL / OUT: 1000 mL / NET: -100 mL        PHYSICAL EXAM:  Vital Signs Last 24 Hrs  T(C): 36.4 (01 Jun 2022 04:53), Max: 36.8 (31 May 2022 21:04)  T(F): 97.5 (01 Jun 2022 04:53), Max: 98.2 (31 May 2022 21:04)  HR: 70 (01 Jun 2022 04:53) (70 - 79)  BP: 147/55 (01 Jun 2022 04:53) (103/52 - 154/72)  BP(mean): --  RR: 18 (01 Jun 2022 04:53) (18 - 18)  SpO2: 95% (01 Jun 2022 04:53) (91% - 95%)    CONSTITUTIONAL: NAD, well-developed  RESPIRATORY: Normal respiratory effort; lungs are clear to auscultation bilaterally  CARDIOVASCULAR: Regular rate and rhythm, normal S1 and S2, no murmur/rub/gallop; No lower extremity edema; Peripheral pulses are 2+ bilaterally  ABDOMEN: Nontender to palpation, normoactive bowel sounds, no rebound/guarding; No hepatosplenomegaly  MUSCULOSKELETAL: no clubbing or cyanosis of digits; no joint swelling or tenderness to palpation  PSYCH: A+O to person, place, and time; affect appropriate    LABS:                        11.9   17.12 )-----------( 381      ( 31 May 2022 07:15 )             36.6     05-31    139  |  100  |  14  ----------------------------<  89  3.8   |  27  |  0.80    Ca    8.7      31 May 2022 07:15  Phos  4.0     05-31  Mg     2.1     05-31    TPro  6.0  /  Alb  2.4<L>  /  TBili  0.4  /  DBili  x   /  AST  20  /  ALT  12  /  AlkPhos  101  05-31                RADIOLOGY & ADDITIONAL TESTS:  Results Reviewed:   Imaging Personally Reviewed:  Electrocardiogram Personally Reviewed:    COORDINATION OF CARE:  Care Discussed with Consultants/Other Providers [Y/N]: Y  Prior or Outpatient Records Reviewed [Y/N]: Y   PROGRESS NOTE:     CONTACT INFO:  Du Landin MD | PGY-1  Pager: [phone] [place], [id number] [facility]  Also on TEAMS  After 7pm page night float  On weekends after 1pm check resident assignment tab to see who is covering      Patient is a 86y old  Female who presents with a chief complaint of Terminal Ileitis (31 May 2022 12:29)      SUBJECTIVE / OVERNIGHT EVENTS:    - No acute events overnight.   - No fevers/chills.  - Patient seen and evaluated at bedside.  - Abdominal pain improving   - Experiencing shoulder pain radiating up to neck, currently counseled as musculoskeletal   - Denies SOB at rest, chest pain, palpitations, nausea/vomiting    ADDITIONAL REVIEW OF SYSTEMS:    MEDICATIONS  (STANDING):  buPROPion XL (24-Hour) . 150 milliGRAM(s) Oral daily  dextrose 5%. 1000 milliLiter(s) (100 mL/Hr) IV Continuous <Continuous>  dextrose 5%. 1000 milliLiter(s) (50 mL/Hr) IV Continuous <Continuous>  dextrose 50% Injectable 25 Gram(s) IV Push once  dextrose 50% Injectable 12.5 Gram(s) IV Push once  dextrose 50% Injectable 25 Gram(s) IV Push once  enoxaparin Injectable 40 milliGRAM(s) SubCutaneous every 24 hours  glucagon  Injectable 1 milliGRAM(s) IntraMuscular once  hydrocortisone 1% Cream 1 Application(s) Topical two times a day  insulin glargine Injectable (LANTUS) 5 Unit(s) SubCutaneous at bedtime  insulin lispro (ADMELOG) corrective regimen sliding scale   SubCutaneous three times a day before meals  insulin lispro (ADMELOG) corrective regimen sliding scale   SubCutaneous at bedtime  insulin lispro Injectable (ADMELOG) 4 Unit(s) SubCutaneous three times a day before meals  mirtazapine 7.5 milliGRAM(s) Oral at bedtime  piperacillin/tazobactam IVPB.. 3.375 Gram(s) IV Intermittent every 8 hours    MEDICATIONS  (PRN):  acetaminophen     Tablet .. 650 milliGRAM(s) Oral every 6 hours PRN Temp greater or equal to 38C (100.4F), Mild Pain (1 - 3), Moderate Pain (4 - 6)  aluminum hydroxide/magnesium hydroxide/simethicone Suspension 30 milliLiter(s) Oral every 4 hours PRN Dyspepsia  dextrose Oral Gel 15 Gram(s) Oral once PRN Blood Glucose LESS THAN 70 milliGRAM(s)/deciliter  melatonin 3 milliGRAM(s) Oral at bedtime PRN Insomnia      CAPILLARY BLOOD GLUCOSE      POCT Blood Glucose.: 174 mg/dL (31 May 2022 21:39)  POCT Blood Glucose.: 129 mg/dL (31 May 2022 17:35)  POCT Blood Glucose.: 97 mg/dL (31 May 2022 13:17)  POCT Blood Glucose.: 170 mg/dL (31 May 2022 12:00)  POCT Blood Glucose.: 95 mg/dL (31 May 2022 08:25)    I&O's Summary    31 May 2022 07:01  -  01 Jun 2022 07:00  --------------------------------------------------------  IN: 900 mL / OUT: 1000 mL / NET: -100 mL        PHYSICAL EXAM:  Vital Signs Last 24 Hrs  T(C): 36.4 (01 Jun 2022 04:53), Max: 36.8 (31 May 2022 21:04)  T(F): 97.5 (01 Jun 2022 04:53), Max: 98.2 (31 May 2022 21:04)  HR: 70 (01 Jun 2022 04:53) (70 - 79)  BP: 147/55 (01 Jun 2022 04:53) (103/52 - 154/72)  BP(mean): --  RR: 18 (01 Jun 2022 04:53) (18 - 18)  SpO2: 95% (01 Jun 2022 04:53) (91% - 95%)      CONSTITUTIONAL: NAD, age-appropriate  HEENT: some tenderness radiating from shoulder to neck    RESPIRATORY: Normal respiratory effort; lungs are clear to auscultation bilaterally  CARDIOVASCULAR: Regular rate and rhythm, normal S1 and S2, no murmur/rub/gallop; No lower extremity edema; Peripheral pulses are 2+ bilaterally  ABDOMEN: Improving tenderness in abdomen, normoactive bowel sounds  MUSCULOSKELETAL: no clubbing or cyanosis of digits; no joint swelling or tenderness to palpation  PSYCH: A+O to person, place, and time; affect appropriate      LABS:                        11.9   17.12 )-----------( 381      ( 31 May 2022 07:15 )             36.6     05-31    139  |  100  |  14  ----------------------------<  89  3.8   |  27  |  0.80    Ca    8.7      31 May 2022 07:15  Phos  4.0     05-31  Mg     2.1     05-31    TPro  6.0  /  Alb  2.4<L>  /  TBili  0.4  /  DBili  x   /  AST  20  /  ALT  12  /  AlkPhos  101  05-31                RADIOLOGY & ADDITIONAL TESTS:  Results Reviewed:   Imaging Personally Reviewed:  Electrocardiogram Personally Reviewed:    COORDINATION OF CARE:  Care Discussed with Consultants/Other Providers [Y/N]: Y  Prior or Outpatient Records Reviewed [Y/N]: ZAIRE

## 2022-06-01 NOTE — DIETITIAN INITIAL EVALUATION ADULT - REASON FOR ADMISSION
Pt is an 87 yo female with PMH of DM who presented with 1 day of abdominal pain. Admitted 5/24. Found to have severe sepsis in the setting of terminal ileitis and possible intramural abscess.

## 2022-06-01 NOTE — DIETITIAN INITIAL EVALUATION ADULT - FACTORS AFF FOOD INTAKE
currently on clear liquid diet in preparation for planned colonoscopy tomorrow/persistent lack of appetite

## 2022-06-01 NOTE — PROGRESS NOTE ADULT - ASSESSMENT
86F presenting w/ abd pain. Found to have inflammation and possible abscess in terminal ileum. No chronic GI symptoms at baseline.    Impression:  #Terminal ileitis: worsening on CT. Still favor infectious cause given onset but cannot rule out inflammatory or malignant ileitis.     Recommendations:  -trend vitals, CBC, and monitor for clinical signs of bleeding  -maintain active type and screen  -transfusion goal to maintain hemoglobin >/= 7.0 and platelets >/= 50  -will order colon prep, clear liquid diet today and please keep NPO at midnight for tentative colonoscopy    Recommendations incomplete until finalized by attending signature/attestation to note.    Heriberto Atkinson, PGY-4  GI/Hepatology Fellow    MONDAY-FRIDAY 8AM-5PM:  Pager# [phone] ([facility]) or [phone] ([facility])    NON-URGENT CONSULTS:  Please email [email or web address] OR [email or web address]  AT NIGHT AND ON WEEKENDS:  Contact on-call GI fellow via answering service ([phone]) from 5pm-8am and on weekends/holidays   86F presenting w/ abd pain. Found to have inflammation and possible abscess in terminal ileum. No chronic GI symptoms at baseline.    Impression:  # Terminal ileitis: worsening on CT. Still favor infectious cause given onset but cannot rule out inflammatory or malignant ileitis.   # Small loculated ascites: unclear etiology or relation to terminal ileitis.      Recommendations:  -trend vitals, CBC, and monitor for clinical signs of bleeding  -maintain active type and screen  -transfusion goal to maintain hemoglobin >/= 7.0 and platelets >/= 50  -will order colon prep, clear liquid diet today and please keep NPO at midnight for tentative colonoscopy  -after colonoscopy, recommend workup for loculated ascites and possible aspiration for diagnostic purposes, however may be related to pathology surrounding TI    Recommendations incomplete until finalized by attending signature/attestation to note.    Heriberto Atkinson, PGY-4  GI/Hepatology Fellow    MONDAY-FRIDAY 8AM-5PM:  Pager# [phone] ([facility]) or [phone] ([facility])    NON-URGENT CONSULTS:  Please email [email or web address]  AT NIGHT AND ON WEEKENDS:  Contact on-call GI fellow via answering service ([phone]) from 5pm-8am and on weekends/holidays

## 2022-06-01 NOTE — DIETITIAN INITIAL EVALUATION ADULT - PERTINENT LABORATORY DATA
06-01    138  |  98  |  12  ----------------------------<  130<H>  3.7   |  30  |  0.87    Ca    8.5      01 Jun 2022 07:12  Phos  3.6     06-01  Mg     2.1     06-01    TPro  5.8<L>  /  Alb  2.3<L>  /  TBili  0.4  /  DBili  x   /  AST  19  /  ALT  10  /  AlkPhos  105  06-01  POCT Blood Glucose.: 218 mg/dL (06-01-22 @ 13:06)  A1C with Estimated Average Glucose Result: 9.3 % (05-25-22 @ 06:39)

## 2022-06-01 NOTE — CONSULT NOTE ADULT - ASSESSMENT
86W with h/o DM  5/25/22: Hgb A1C = 9.3%), obesity (BMI = 36.2) admitted 5/24/22 with one day of abdominal pain.    ileitis - worsening on most recent CT  fever  leukocytosis    Antibiotics  Vanco x1 5/24  Zosyn 5/24-->    infectious ileitis can be occasional primary process. Terminal tuberculous ileitis would have different radiographic appearance and much less acute clinical pace. Yersinia enterocolitica generally causes a pseudoappendicitis syndrome. Salmonella infection tends not to cause as much inflammatory reaction and like other enteric pathogens would be likely to be picked up on GI PCR. Inflammatory bowel disease - Crohns- occasionally presents  in advanced age - usually defined >60 years (william LUCAS. Old-age inflammatory bowel disease onset: a different problem?. World J Gastroenterol. 2011;17(22):9221-1934. doi:10.3748/wjg.[email or web address]) but appears unlikely in this case.    Patient appears to have bowel ischemia with secondary bacterial invasion. Worsening despite Zosyn, so will shift antibiotic coverage    Suggest  STOP Zosyn  Cefepime/Flagyl ordered  amylase, lipase, lactate in am

## 2022-06-01 NOTE — PROGRESS NOTE ADULT - PROBLEM SELECTOR PLAN 3
Per Pharmacy, patient takes prandin and amaryl at home   - hold prandin and amaryl during inpatient   - May start metformin 500mg QD outpatient    - A1C 9.3  - decrease lantus from 10U to 5U and c/w mealtime 4U; titrate to 100-180 FS  - c/w DIGNA/FS qac, qhs Per Pharmacy, patient takes prandin and amaryl at home   - hold prandin and amaryl during inpatient   - May start metformin 500mg QD outpatient    - A1C 9.3  - c/w lantus 5U and mealtime 4U; titrate to 100-180 FS  - c/w DIGNA/FS qac, qhs

## 2022-06-02 ENCOUNTER — RESULT REVIEW (OUTPATIENT)
Age: 87
End: 2022-06-02

## 2022-06-02 LAB
ALBUMIN SERPL ELPH-MCNC: 2.5 G/DL — LOW (ref 3.3–5)
ALP SERPL-CCNC: 93 U/L — SIGNIFICANT CHANGE UP (ref 40–120)
ALT FLD-CCNC: 10 U/L — SIGNIFICANT CHANGE UP (ref 10–45)
AMYLASE P1 CFR SERPL: 44 U/L — SIGNIFICANT CHANGE UP (ref 25–125)
ANION GAP SERPL CALC-SCNC: 10 MMOL/L — SIGNIFICANT CHANGE UP (ref 5–17)
APTT BLD: 26.8 SEC — LOW (ref 27.5–35.5)
AST SERPL-CCNC: 18 U/L — SIGNIFICANT CHANGE UP (ref 10–40)
BILIRUB SERPL-MCNC: 0.3 MG/DL — SIGNIFICANT CHANGE UP (ref 0.2–1.2)
BUN SERPL-MCNC: 13 MG/DL — SIGNIFICANT CHANGE UP (ref 7–23)
CALCIUM SERPL-MCNC: 8.7 MG/DL — SIGNIFICANT CHANGE UP (ref 8.4–10.5)
CHLORIDE SERPL-SCNC: 100 MMOL/L — SIGNIFICANT CHANGE UP (ref 96–108)
CO2 SERPL-SCNC: 29 MMOL/L — SIGNIFICANT CHANGE UP (ref 22–31)
CREAT SERPL-MCNC: 0.8 MG/DL — SIGNIFICANT CHANGE UP (ref 0.5–1.3)
EGFR: 72 ML/MIN/1.73M2 — SIGNIFICANT CHANGE UP
GLUCOSE BLDC GLUCOMTR-MCNC: 102 MG/DL — HIGH (ref 70–99)
GLUCOSE BLDC GLUCOMTR-MCNC: 104 MG/DL — HIGH (ref 70–99)
GLUCOSE BLDC GLUCOMTR-MCNC: 111 MG/DL — HIGH (ref 70–99)
GLUCOSE BLDC GLUCOMTR-MCNC: 82 MG/DL — SIGNIFICANT CHANGE UP (ref 70–99)
GLUCOSE SERPL-MCNC: 110 MG/DL — HIGH (ref 70–99)
HCT VFR BLD CALC: 34.8 % — SIGNIFICANT CHANGE UP (ref 34.5–45)
HGB BLD-MCNC: 11 G/DL — LOW (ref 11.5–15.5)
INR BLD: 1.19 RATIO — HIGH (ref 0.88–1.16)
LACTATE SERPL-SCNC: 1.1 MMOL/L — SIGNIFICANT CHANGE UP (ref 0.7–2)
LIDOCAIN IGE QN: 29 U/L — SIGNIFICANT CHANGE UP (ref 7–60)
MAGNESIUM SERPL-MCNC: 2.2 MG/DL — SIGNIFICANT CHANGE UP (ref 1.6–2.6)
MCHC RBC-ENTMCNC: 30.8 PG — SIGNIFICANT CHANGE UP (ref 27–34)
MCHC RBC-ENTMCNC: 31.6 GM/DL — LOW (ref 32–36)
MCV RBC AUTO: 97.5 FL — SIGNIFICANT CHANGE UP (ref 80–100)
NRBC # BLD: 0 /100 WBCS — SIGNIFICANT CHANGE UP (ref 0–0)
PHOSPHATE SERPL-MCNC: 3.6 MG/DL — SIGNIFICANT CHANGE UP (ref 2.5–4.5)
PLATELET # BLD AUTO: 431 K/UL — HIGH (ref 150–400)
POTASSIUM SERPL-MCNC: 4.1 MMOL/L — SIGNIFICANT CHANGE UP (ref 3.5–5.3)
POTASSIUM SERPL-SCNC: 4.1 MMOL/L — SIGNIFICANT CHANGE UP (ref 3.5–5.3)
PROT SERPL-MCNC: 5.9 G/DL — LOW (ref 6–8.3)
PROTHROM AB SERPL-ACNC: 13.8 SEC — HIGH (ref 10.5–13.4)
RBC # BLD: 3.57 M/UL — LOW (ref 3.8–5.2)
RBC # FLD: 12.3 % — SIGNIFICANT CHANGE UP (ref 10.3–14.5)
SODIUM SERPL-SCNC: 139 MMOL/L — SIGNIFICANT CHANGE UP (ref 135–145)
WBC # BLD: 12.74 K/UL — HIGH (ref 3.8–10.5)
WBC # FLD AUTO: 12.74 K/UL — HIGH (ref 3.8–10.5)

## 2022-06-02 PROCEDURE — 88305 TISSUE EXAM BY PATHOLOGIST: CPT | Mod: 26

## 2022-06-02 PROCEDURE — 99232 SBSQ HOSP IP/OBS MODERATE 35: CPT

## 2022-06-02 PROCEDURE — 45380 COLONOSCOPY AND BIOPSY: CPT

## 2022-06-02 DEVICE — KIT A-LINE 1LUM 20G X 12CM SAFE KIT: Type: IMPLANTABLE DEVICE | Status: FUNCTIONAL

## 2022-06-02 DEVICE — NET RETRV ROT ROTH 2.5MMX230CM: Type: IMPLANTABLE DEVICE | Status: FUNCTIONAL

## 2022-06-02 DEVICE — CATH THERMODIL PACE 7.5FR: Type: IMPLANTABLE DEVICE | Status: FUNCTIONAL

## 2022-06-02 DEVICE — KIT CVC 2LUM MAC 9FR CHG: Type: IMPLANTABLE DEVICE | Status: FUNCTIONAL

## 2022-06-02 RX ORDER — SODIUM CHLORIDE 9 MG/ML
500 INJECTION INTRAMUSCULAR; INTRAVENOUS; SUBCUTANEOUS
Refills: 0 | Status: COMPLETED | OUTPATIENT
Start: 2022-06-02 | End: 2022-06-02

## 2022-06-02 RX ADMIN — SODIUM CHLORIDE 30 MILLILITER(S): 9 INJECTION INTRAMUSCULAR; INTRAVENOUS; SUBCUTANEOUS at 18:28

## 2022-06-02 RX ADMIN — CEFEPIME 100 MILLIGRAM(S): 1 INJECTION, POWDER, FOR SOLUTION INTRAMUSCULAR; INTRAVENOUS at 18:21

## 2022-06-02 RX ADMIN — Medication 4 UNIT(S): at 08:58

## 2022-06-02 RX ADMIN — Medication 1 APPLICATION(S): at 05:18

## 2022-06-02 RX ADMIN — INSULIN GLARGINE 5 UNIT(S): 100 INJECTION, SOLUTION SUBCUTANEOUS at 22:28

## 2022-06-02 RX ADMIN — Medication 100 MILLIGRAM(S): at 18:12

## 2022-06-02 RX ADMIN — Medication 1 APPLICATION(S): at 18:10

## 2022-06-02 RX ADMIN — MIRTAZAPINE 7.5 MILLIGRAM(S): 45 TABLET, ORALLY DISINTEGRATING ORAL at 22:29

## 2022-06-02 RX ADMIN — CEFEPIME 100 MILLIGRAM(S): 1 INJECTION, POWDER, FOR SOLUTION INTRAMUSCULAR; INTRAVENOUS at 05:18

## 2022-06-02 RX ADMIN — ENOXAPARIN SODIUM 40 MILLIGRAM(S): 100 INJECTION SUBCUTANEOUS at 18:12

## 2022-06-02 RX ADMIN — Medication 100 MILLIGRAM(S): at 05:18

## 2022-06-02 NOTE — PRE-OP CHECKLIST - NEEDLE GAUGE
Transition Of Care Follow Up    Patient Outreached Attempted. Received patient's voicemail box. Message left requesting call back. 20

## 2022-06-02 NOTE — PRE-ANESTHESIA EVALUATION ADULT - NSANTHOSAYNRD_GEN_A_CORE
No. HEVER screening performed.  STOP BANG Legend: 0-2 = LOW Risk; 3-4 = INTERMEDIATE Risk; 5-8 = HIGH Risk

## 2022-06-02 NOTE — PROGRESS NOTE ADULT - PROBLEM SELECTOR PLAN 3
Per Pharmacy, patient takes prandin and amaryl at home   - hold prandin and amaryl during inpatient   - May start metformin 500mg QD outpatient    - A1C 9.3  - c/w lantus 5U and mealtime 4U; titrate to 100-180 FS  - c/w DIGNA/FS qac, qhs

## 2022-06-02 NOTE — PRE PROCEDURE NOTE - PRE PROCEDURE EVALUATION
Attending Physician:    Dr. Jc                        Procedure:   Colonoscopy    Indication for Procedure:   Concern for Crohn's Disease  ________________________________________________________  PAST MEDICAL & SURGICAL HISTORY:    Diabetes    S/P TKR (total knee replacement)    ALLERGIES:  No Known Allergies    HOME MEDICATIONS:  Amaryl 4 mg oral tablet: 1 tab(s) orally 2 times a day  Prandin 1 mg oral tablet: 1 tab(s) orally 3 times a day (with meals)    AICD/PPM: [ ] yes   [X ] no    PERTINENT LAB DATA:                        11.0   12.74 )-----------( 431      ( 02 Jun 2022 07:16 )             34.8     06-02    139  |  100  |  13  ----------------------------<  110<H>  4.1   |  29  |  0.80    Ca    8.7      02 Jun 2022 07:15  Phos  3.6     06-02  Mg     2.2     06-02  TPro  5.9<L>  /  Alb  2.5<L>  /  TBili  0.3  /  DBili  x   /  AST  18  /  ALT  10  /  AlkPhos  93  06-02  PT/INR - ( 02 Jun 2022 07:19 )   PT: 13.8 sec;   INR: 1.19 ratio    PTT - ( 02 Jun 2022 07:19 )  PTT:26.8 sec    PHYSICAL EXAMINATION:    Height (cm): 157.2  Weight (kg): 89.8  BMI (kg/m2): 36.3  BSA (m2): 1.9T(C): 36.9  HR: 71  BP: 149/62  RR: 20  SpO2: 96%    Constitutional: NAD    Neck:  No JVD  Respiratory: CTAB/L  Cardiovascular: S1 and S2  Gastrointestinal: BS+, soft, NT/ND  Extremities: No peripheral edema  Neurological: A/O x 4      COMMENTS:    The patient is a suitable candidate for the planned procedure unless box checked [ ]  No, explain:

## 2022-06-02 NOTE — PROGRESS NOTE ADULT - ATTENDING COMMENTS
rlq pain resolved, abd nontender, wbc trending down, for colo today, will continue conservative/non op treatment as long as continues to improve

## 2022-06-02 NOTE — PROGRESS NOTE ADULT - PROBLEM SELECTOR PLAN 2
Admitted with severe sepsis (Tmax 101.2, leukocytosis, tachycardic to 116-123, 15.3% bands, lactate to 3.7); s/p total 1.5 L   - CT angio revealed distal terminal ileitis and concern for small intramural abscess   - CXR clear, UA negative, pending BCX  - GI PCR (-)tive  - f/u C. diff   - c/w zosyn (5/24 - )  - To discontinue zosyn after colonoscopy   - BCx 5/23 grew 1 out of 2 bottles CoNS; repeat BCx 4/24 NGTD, likely contaminant, will not treat

## 2022-06-02 NOTE — PROGRESS NOTE ADULT - SUBJECTIVE AND OBJECTIVE BOX
Green Team Surgery Progress Note    SUBJECTIVE:  Patient seen and examined at bedside with surgical team.   No acute events overnight.   Sleeping comfortably.     OBJECTIVE:    Vital Signs Last 24 Hrs  T(C): 36.9 (02 Jun 2022 05:40), Max: 37.2 (01 Jun 2022 20:11)  T(F): 98.4 (02 Jun 2022 05:40), Max: 98.9 (01 Jun 2022 20:11)  HR: 71 (02 Jun 2022 05:40) (71 - 103)  BP: 149/62 (02 Jun 2022 05:40) (109/71 - 149/62)  BP(mean): --  RR: 18 (02 Jun 2022 05:40) (18 - 20)  SpO2: 96% (02 Jun 2022 05:40) (93% - 96%)    MEDICATIONS  (STANDING):  buPROPion XL (24-Hour) . 150 milliGRAM(s) Oral daily  cefepime   IVPB 1000 milliGRAM(s) IV Intermittent every 12 hours  dextrose 5%. 1000 milliLiter(s) (100 mL/Hr) IV Continuous <Continuous>  dextrose 5%. 1000 milliLiter(s) (50 mL/Hr) IV Continuous <Continuous>  dextrose 50% Injectable 25 Gram(s) IV Push once  dextrose 50% Injectable 12.5 Gram(s) IV Push once  dextrose 50% Injectable 25 Gram(s) IV Push once  enoxaparin Injectable 40 milliGRAM(s) SubCutaneous every 24 hours  glucagon  Injectable 1 milliGRAM(s) IntraMuscular once  hydrocortisone 1% Cream 1 Application(s) Topical two times a day  insulin glargine Injectable (LANTUS) 5 Unit(s) SubCutaneous at bedtime  insulin lispro (ADMELOG) corrective regimen sliding scale   SubCutaneous three times a day before meals  insulin lispro (ADMELOG) corrective regimen sliding scale   SubCutaneous at bedtime  insulin lispro Injectable (ADMELOG) 4 Unit(s) SubCutaneous three times a day before meals  metroNIDAZOLE  IVPB 500 milliGRAM(s) IV Intermittent every 12 hours  mirtazapine 7.5 milliGRAM(s) Oral at bedtime    MEDICATIONS  (PRN):  acetaminophen     Tablet .. 650 milliGRAM(s) Oral every 6 hours PRN Temp greater or equal to 38C (100.4F), Mild Pain (1 - 3), Moderate Pain (4 - 6)  aluminum hydroxide/magnesium hydroxide/simethicone Suspension 30 milliLiter(s) Oral every 4 hours PRN Dyspepsia  dextrose Oral Gel 15 Gram(s) Oral once PRN Blood Glucose LESS THAN 70 milliGRAM(s)/deciliter  melatonin 3 milliGRAM(s) Oral at bedtime PRN Insomnia    Physical exam:  General; NAD  Respiratory: nonlabored breathing  Abdomen: soft, ND, RLQ TTP. No rebound or guarding  Extremities: [facility]       LABS:                        11.0   12.74 )-----------( 431      ( 02 Jun 2022 07:16 )             34.8     06-02    139  |  100  |  13  ----------------------------<  110<H>  4.1   |  29  |  0.80    Ca    8.7      02 Jun 2022 07:15  Phos  3.6     06-02  Mg     2.2     06-02    TPro  5.9<L>  /  Alb  2.5<L>  /  TBili  0.3  /  DBili  x   /  AST  18  /  ALT  10  /  AlkPhos  93  06-02    PT/INR - ( 02 Jun 2022 07:19 )   PT: 13.8 sec;   INR: 1.19 ratio         PTT - ( 02 Jun 2022 07:19 )  PTT:26.8 sec  LIVER FUNCTIONS - ( 02 Jun 2022 07:15 )  Alb: 2.5 g/dL / Pro: 5.9 g/dL / ALK PHOS: 93 U/L / ALT: 10 U/L / AST: 18 U/L / GGT: x

## 2022-06-02 NOTE — PROVIDER CONTACT NOTE (OTHER) - SITUATION
pt unable to complete [place]. pt stated I can't drink anymore   pt educated the importance of bowel prep before colonoscopy. pt still refusing to drink
Patient noted with rash on the right wrist

## 2022-06-02 NOTE — PROGRESS NOTE ADULT - SUBJECTIVE AND OBJECTIVE BOX
PROGRESS NOTE:     CONTACT INFO:  Du Landin MD | PGY-1  Pager: [phone] [place], [address] [facility]  Also on TEAMS  After 7pm page night float  On weekends after 1pm check resident assignment tab to see who is covering      Patient is a 86y old  Female who presents with a chief complaint of Terminal Ileitis (01 Jun 2022 18:39)      SUBJECTIVE / OVERNIGHT EVENTS:    - No acute events overnight.   - No fevers/chills.  - Patient seen and evaluated at bedside.  - Denies SOB at rest, chest pain, palpitations, abdominal pain, nausea/vomiting    ADDITIONAL REVIEW OF SYSTEMS:    MEDICATIONS  (STANDING):  buPROPion XL (24-Hour) . 150 milliGRAM(s) Oral daily  cefepime   IVPB 1000 milliGRAM(s) IV Intermittent every 12 hours  dextrose 5%. 1000 milliLiter(s) (50 mL/Hr) IV Continuous <Continuous>  dextrose 5%. 1000 milliLiter(s) (100 mL/Hr) IV Continuous <Continuous>  dextrose 50% Injectable 25 Gram(s) IV Push once  dextrose 50% Injectable 12.5 Gram(s) IV Push once  dextrose 50% Injectable 25 Gram(s) IV Push once  enoxaparin Injectable 40 milliGRAM(s) SubCutaneous every 24 hours  glucagon  Injectable 1 milliGRAM(s) IntraMuscular once  hydrocortisone 1% Cream 1 Application(s) Topical two times a day  insulin glargine Injectable (LANTUS) 5 Unit(s) SubCutaneous at bedtime  insulin lispro (ADMELOG) corrective regimen sliding scale   SubCutaneous three times a day before meals  insulin lispro (ADMELOG) corrective regimen sliding scale   SubCutaneous at bedtime  insulin lispro Injectable (ADMELOG) 4 Unit(s) SubCutaneous three times a day before meals  metroNIDAZOLE  IVPB 500 milliGRAM(s) IV Intermittent every 12 hours  mirtazapine 7.5 milliGRAM(s) Oral at bedtime    MEDICATIONS  (PRN):  acetaminophen     Tablet .. 650 milliGRAM(s) Oral every 6 hours PRN Temp greater or equal to 38C (100.4F), Mild Pain (1 - 3), Moderate Pain (4 - 6)  aluminum hydroxide/magnesium hydroxide/simethicone Suspension 30 milliLiter(s) Oral every 4 hours PRN Dyspepsia  dextrose Oral Gel 15 Gram(s) Oral once PRN Blood Glucose LESS THAN 70 milliGRAM(s)/deciliter  melatonin 3 milliGRAM(s) Oral at bedtime PRN Insomnia      CAPILLARY BLOOD GLUCOSE      POCT Blood Glucose.: 132 mg/dL (01 Jun 2022 21:50)  POCT Blood Glucose.: 185 mg/dL (01 Jun 2022 18:15)  POCT Blood Glucose.: 218 mg/dL (01 Jun 2022 13:06)  POCT Blood Glucose.: 154 mg/dL (01 Jun 2022 08:26)    I&O's Summary    01 Jun 2022 07:01  -  02 Jun 2022 07:00  --------------------------------------------------------  IN: 390 mL / OUT: 0 mL / NET: 390 mL        PHYSICAL EXAM:  Vital Signs Last 24 Hrs  T(C): 36.9 (02 Jun 2022 05:40), Max: 37.2 (01 Jun 2022 20:11)  T(F): 98.4 (02 Jun 2022 05:40), Max: 98.9 (01 Jun 2022 20:11)  HR: 71 (02 Jun 2022 05:40) (71 - 103)  BP: 149/62 (02 Jun 2022 05:40) (109/71 - 149/62)  BP(mean): --  RR: 18 (02 Jun 2022 05:40) (18 - 20)  SpO2: 96% (02 Jun 2022 05:40) (93% - 96%)    CONSTITUTIONAL: NAD, well-developed  RESPIRATORY: Normal respiratory effort; lungs are clear to auscultation bilaterally  CARDIOVASCULAR: Regular rate and rhythm, normal S1 and S2, no murmur/rub/gallop; No lower extremity edema; Peripheral pulses are 2+ bilaterally  ABDOMEN: Nontender to palpation, normoactive bowel sounds, no rebound/guarding; No hepatosplenomegaly  MUSCULOSKELETAL: no clubbing or cyanosis of digits; no joint swelling or tenderness to palpation  PSYCH: A+O to person, place, and time; affect appropriate    LABS:                        11.3   13.33 )-----------( 441      ( 01 Jun 2022 07:12 )             35.1     06-01    138  |  98  |  12  ----------------------------<  130<H>  3.7   |  30  |  0.87    Ca    8.5      01 Jun 2022 07:12  Phos  3.6     06-01  Mg     2.1     06-01    TPro  5.8<L>  /  Alb  2.3<L>  /  TBili  0.4  /  DBili  x   /  AST  19  /  ALT  10  /  AlkPhos  105  06-01                RADIOLOGY & ADDITIONAL TESTS:  Results Reviewed:   Imaging Personally Reviewed:  Electrocardiogram Personally Reviewed:    COORDINATION OF CARE:  Care Discussed with Consultants/Other Providers [Y/N]: Y  Prior or Outpatient Records Reviewed [Y/N]: Y

## 2022-06-02 NOTE — PROGRESS NOTE ADULT - ASSESSMENT
86F w/ PMH T2DM who presents w/ abdominal and shoulder pain, found to have severe sepsis int he setting of terminal ileitis and possible intramural abscess as seen on CTA. Meckel scan negative. CT5/28 shows worsening ileitis but no intramural abscess.     Plan:  - Trend WBC, now downtrending  - GI to scope today, will follow up findings  - recommend CT A/P on Friday 6/3   - possible OR on Saturday depending on findings of CT A/P and clinical course   - continue abx  - pain control  - rest of care per primary team     Green Surgery   [id number]

## 2022-06-02 NOTE — PROGRESS NOTE ADULT - PROBLEM SELECTOR PLAN 1
i/s/o severe sepsis   - CT angio revealed distal terminal ileitis and c/f small intramural abscess  - surgery consulted; possible surgery depending on results of colonoscopy   - GI consulted; colonoscopy tomorrow  - CT abdomen scan demonstrate worsening distal ileitis. No intramural abscess. Small loculated ascites.   - repeat CT abdomen on Friday, pending OR depending on results of colonoscopy    - Lactate (suspicious for ischemia) and coags in AM  - NPO@[place]

## 2022-06-02 NOTE — PROGRESS NOTE ADULT - PROBLEM SELECTOR PLAN 5
- Patient verbalized that she "just wants to end it"  - Expresses sadness that friends and family have either  or moved away   - Thought about overdosing on medications   - Psych consulted, appreciate recs   - c/w remeron 7.5mg PO Qhs   - c/w wellbutryn XL 150mg PO Qd

## 2022-06-03 LAB
ALBUMIN SERPL ELPH-MCNC: 2.5 G/DL — LOW (ref 3.3–5)
ALP SERPL-CCNC: 82 U/L — SIGNIFICANT CHANGE UP (ref 40–120)
ALT FLD-CCNC: 10 U/L — SIGNIFICANT CHANGE UP (ref 10–45)
ANION GAP SERPL CALC-SCNC: 10 MMOL/L — SIGNIFICANT CHANGE UP (ref 5–17)
AST SERPL-CCNC: 20 U/L — SIGNIFICANT CHANGE UP (ref 10–40)
BILIRUB SERPL-MCNC: 0.2 MG/DL — SIGNIFICANT CHANGE UP (ref 0.2–1.2)
BUN SERPL-MCNC: 11 MG/DL — SIGNIFICANT CHANGE UP (ref 7–23)
CALCIUM SERPL-MCNC: 8.5 MG/DL — SIGNIFICANT CHANGE UP (ref 8.4–10.5)
CHLORIDE SERPL-SCNC: 101 MMOL/L — SIGNIFICANT CHANGE UP (ref 96–108)
CO2 SERPL-SCNC: 28 MMOL/L — SIGNIFICANT CHANGE UP (ref 22–31)
CREAT SERPL-MCNC: 0.79 MG/DL — SIGNIFICANT CHANGE UP (ref 0.5–1.3)
EGFR: 73 ML/MIN/1.73M2 — SIGNIFICANT CHANGE UP
GLUCOSE BLDC GLUCOMTR-MCNC: 201 MG/DL — HIGH (ref 70–99)
GLUCOSE BLDC GLUCOMTR-MCNC: 207 MG/DL — HIGH (ref 70–99)
GLUCOSE BLDC GLUCOMTR-MCNC: 232 MG/DL — HIGH (ref 70–99)
GLUCOSE BLDC GLUCOMTR-MCNC: 277 MG/DL — HIGH (ref 70–99)
GLUCOSE BLDC GLUCOMTR-MCNC: 67 MG/DL — LOW (ref 70–99)
GLUCOSE BLDC GLUCOMTR-MCNC: 70 MG/DL — SIGNIFICANT CHANGE UP (ref 70–99)
GLUCOSE BLDC GLUCOMTR-MCNC: 89 MG/DL — SIGNIFICANT CHANGE UP (ref 70–99)
GLUCOSE BLDC GLUCOMTR-MCNC: 98 MG/DL — SIGNIFICANT CHANGE UP (ref 70–99)
GLUCOSE SERPL-MCNC: 65 MG/DL — LOW (ref 70–99)
HCT VFR BLD CALC: 35.3 % — SIGNIFICANT CHANGE UP (ref 34.5–45)
HGB BLD-MCNC: 11.3 G/DL — LOW (ref 11.5–15.5)
MAGNESIUM SERPL-MCNC: 2.1 MG/DL — SIGNIFICANT CHANGE UP (ref 1.6–2.6)
MCHC RBC-ENTMCNC: 30.9 PG — SIGNIFICANT CHANGE UP (ref 27–34)
MCHC RBC-ENTMCNC: 32 GM/DL — SIGNIFICANT CHANGE UP (ref 32–36)
MCV RBC AUTO: 96.4 FL — SIGNIFICANT CHANGE UP (ref 80–100)
NRBC # BLD: 0 /100 WBCS — SIGNIFICANT CHANGE UP (ref 0–0)
PHOSPHATE SERPL-MCNC: 3.5 MG/DL — SIGNIFICANT CHANGE UP (ref 2.5–4.5)
PLATELET # BLD AUTO: 515 K/UL — HIGH (ref 150–400)
POTASSIUM SERPL-MCNC: 4.2 MMOL/L — SIGNIFICANT CHANGE UP (ref 3.5–5.3)
POTASSIUM SERPL-SCNC: 4.2 MMOL/L — SIGNIFICANT CHANGE UP (ref 3.5–5.3)
PROT SERPL-MCNC: 5.8 G/DL — LOW (ref 6–8.3)
RBC # BLD: 3.66 M/UL — LOW (ref 3.8–5.2)
RBC # FLD: 12.3 % — SIGNIFICANT CHANGE UP (ref 10.3–14.5)
SODIUM SERPL-SCNC: 139 MMOL/L — SIGNIFICANT CHANGE UP (ref 135–145)
WBC # BLD: 13.38 K/UL — HIGH (ref 3.8–10.5)
WBC # FLD AUTO: 13.38 K/UL — HIGH (ref 3.8–10.5)

## 2022-06-03 PROCEDURE — 99232 SBSQ HOSP IP/OBS MODERATE 35: CPT

## 2022-06-03 PROCEDURE — 99233 SBSQ HOSP IP/OBS HIGH 50: CPT | Mod: GC

## 2022-06-03 RX ADMIN — Medication 100 MILLIGRAM(S): at 19:41

## 2022-06-03 RX ADMIN — Medication 4 UNIT(S): at 12:08

## 2022-06-03 RX ADMIN — Medication 3: at 12:07

## 2022-06-03 RX ADMIN — Medication 1 APPLICATION(S): at 07:07

## 2022-06-03 RX ADMIN — INSULIN GLARGINE 5 UNIT(S): 100 INJECTION, SOLUTION SUBCUTANEOUS at 22:38

## 2022-06-03 RX ADMIN — Medication 100 MILLIGRAM(S): at 07:12

## 2022-06-03 RX ADMIN — ENOXAPARIN SODIUM 40 MILLIGRAM(S): 100 INJECTION SUBCUTANEOUS at 19:07

## 2022-06-03 RX ADMIN — CEFEPIME 100 MILLIGRAM(S): 1 INJECTION, POWDER, FOR SOLUTION INTRAMUSCULAR; INTRAVENOUS at 07:10

## 2022-06-03 RX ADMIN — Medication 2: at 19:06

## 2022-06-03 RX ADMIN — Medication 4 UNIT(S): at 19:42

## 2022-06-03 RX ADMIN — MIRTAZAPINE 7.5 MILLIGRAM(S): 45 TABLET, ORALLY DISINTEGRATING ORAL at 22:39

## 2022-06-03 RX ADMIN — Medication 1 APPLICATION(S): at 19:07

## 2022-06-03 RX ADMIN — CEFEPIME 100 MILLIGRAM(S): 1 INJECTION, POWDER, FOR SOLUTION INTRAMUSCULAR; INTRAVENOUS at 19:10

## 2022-06-03 NOTE — PROGRESS NOTE ADULT - ASSESSMENT
86F w/ PMH T2DM who presents w/ abdominal and shoulder pain, found to have severe sepsis int he setting of terminal ileitis and possible intramural abscess as seen on CTA. Meckel scan negative. S/p CT scan, to consult surgery as to further management. Colonoscopy deferred to Thursday  86F w/ PMH T2DM who presents w/ abdominal and shoulder pain, found to have severe sepsis int he setting of terminal ileitis and possible intramural abscess as seen on CTA. Meckel scan negative. S/p CT scan, to consult surgery as to further management. Now status post colonoscopy, pending discharge.

## 2022-06-03 NOTE — PROGRESS NOTE ADULT - ASSESSMENT
86W with h/o DM  5/25/22: Hgb A1C = 9.3%), obesity (BMI = 36.2) admitted 5/24/22 with one day of abdominal pain.    ileitis - worsening on most recent CT  fever  leukocytosis    Antibiotics  Vanco x1 5/24  Zosyn 5/24-->6/1  Cefepime 6/1 -->  Flagy 6/1-->    infectious ileitis can be occasional primary process. Terminal tuberculous ileitis would have different radiographic appearance and much less acute clinical pace. Yersinia enterocolitica generally causes a pseudoappendicitis syndrome. Salmonella infection tends not to cause as much inflammatory reaction and like other enteric pathogens would be likely to be picked up on GI PCR.  The normal appearing mucosa at endoscopy excludes Inflammatory bowel disease and makes bowel ischemia very unlikely    Suggest  Continue Cefepime/Flagyl though 6/6  consider repeat abd CT  await bx from colonoscopy    discussed with primary attending  I will be out until 6/18 - ID service will follow

## 2022-06-03 NOTE — PROGRESS NOTE ADULT - PROBLEM SELECTOR PLAN 5
- Patient verbalized that she "just wants to end it"  - Expresses sadness that friends and family have either  or moved away   - Thought about overdosing on medications   - Psych consulted, appreciate recs   - c/w remeron 7.5mg PO Qhs   - c/w wellbutryn XL 150mg PO Qd - Patient verbalized that she "just wants to end it"  - Expresses sadness that friends and family have either  or moved away   - Thought about overdosing on medications   - Psych consulted, appreciate recs   - c/w remeron 7.5mg PO Qhs   - dc jimbo XL

## 2022-06-03 NOTE — PROGRESS NOTE ADULT - SUBJECTIVE AND OBJECTIVE BOX
PROGRESS NOTE:     CONTACT INFO:  Du Landin MD | PGY-1  Pager: [phone] [place], [address] [facility]  Also on TEAMS  After 7pm page night float  On weekends after 1pm check resident assignment tab to see who is covering      Patient is a 86y old  Female who presents with a chief complaint of Terminal Ileitis (02 Jun 2022 09:04)      SUBJECTIVE / OVERNIGHT EVENTS:    - No acute events overnight.   - No fevers/chills.  - Patient seen and evaluated at bedside.  - Denies SOB at rest, chest pain, palpitations, abdominal pain, nausea/vomiting    ADDITIONAL REVIEW OF SYSTEMS:    MEDICATIONS  (STANDING):  cefepime   IVPB 1000 milliGRAM(s) IV Intermittent every 12 hours  dextrose 5%. 1000 milliLiter(s) (100 mL/Hr) IV Continuous <Continuous>  dextrose 5%. 1000 milliLiter(s) (50 mL/Hr) IV Continuous <Continuous>  dextrose 50% Injectable 25 Gram(s) IV Push once  dextrose 50% Injectable 12.5 Gram(s) IV Push once  dextrose 50% Injectable 25 Gram(s) IV Push once  enoxaparin Injectable 40 milliGRAM(s) SubCutaneous every 24 hours  glucagon  Injectable 1 milliGRAM(s) IntraMuscular once  hydrocortisone 1% Cream 1 Application(s) Topical two times a day  insulin glargine Injectable (LANTUS) 5 Unit(s) SubCutaneous at bedtime  insulin lispro (ADMELOG) corrective regimen sliding scale   SubCutaneous three times a day before meals  insulin lispro (ADMELOG) corrective regimen sliding scale   SubCutaneous at bedtime  insulin lispro Injectable (ADMELOG) 4 Unit(s) SubCutaneous three times a day before meals  metroNIDAZOLE  IVPB 500 milliGRAM(s) IV Intermittent every 12 hours  mirtazapine 7.5 milliGRAM(s) Oral at bedtime    MEDICATIONS  (PRN):  acetaminophen     Tablet .. 650 milliGRAM(s) Oral every 6 hours PRN Temp greater or equal to 38C (100.4F), Mild Pain (1 - 3), Moderate Pain (4 - 6)  aluminum hydroxide/magnesium hydroxide/simethicone Suspension 30 milliLiter(s) Oral every 4 hours PRN Dyspepsia  dextrose Oral Gel 15 Gram(s) Oral once PRN Blood Glucose LESS THAN 70 milliGRAM(s)/deciliter  melatonin 3 milliGRAM(s) Oral at bedtime PRN Insomnia      CAPILLARY BLOOD GLUCOSE      POCT Blood Glucose.: 102 mg/dL (02 Jun 2022 22:00)  POCT Blood Glucose.: 82 mg/dL (02 Jun 2022 17:32)  POCT Blood Glucose.: 104 mg/dL (02 Jun 2022 11:46)  POCT Blood Glucose.: 111 mg/dL (02 Jun 2022 08:51)    I&O's Summary    02 Jun 2022 07:01  -  03 Jun 2022 07:00  --------------------------------------------------------  IN: 650 mL / OUT: 0 mL / NET: 650 mL        PHYSICAL EXAM:  Vital Signs Last 24 Hrs  T(C): 36.8 (03 Jun 2022 05:18), Max: 37.5 (02 Jun 2022 21:27)  T(F): 98.2 (03 Jun 2022 05:18), Max: 99.5 (02 Jun 2022 21:27)  HR: 64 (03 Jun 2022 05:18) (62 - 90)  BP: 146/60 (03 Jun 2022 05:18) (97/31 - 156/63)  BP(mean): --  RR: 18 (03 Jun 2022 05:18) (16 - 20)  SpO2: 97% (03 Jun 2022 05:18) (94% - 97%)    CONSTITUTIONAL: NAD, well-developed  RESPIRATORY: Normal respiratory effort; lungs are clear to auscultation bilaterally  CARDIOVASCULAR: Regular rate and rhythm, normal S1 and S2, no murmur/rub/gallop; No lower extremity edema; Peripheral pulses are 2+ bilaterally  ABDOMEN: Nontender to palpation, normoactive bowel sounds, no rebound/guarding; No hepatosplenomegaly  MUSCULOSKELETAL: no clubbing or cyanosis of digits; no joint swelling or tenderness to palpation  PSYCH: A+O to person, place, and time; affect appropriate    LABS:                        11.3   13.38 )-----------( 515      ( 03 Jun 2022 06:46 )             35.3     06-02    139  |  100  |  13  ----------------------------<  110<H>  4.1   |  29  |  0.80    Ca    8.7      02 Jun 2022 07:15  Phos  3.6     06-02  Mg     2.2     06-02    TPro  5.9<L>  /  Alb  2.5<L>  /  TBili  0.3  /  DBili  x   /  AST  18  /  ALT  10  /  AlkPhos  93  06-02    PT/INR - ( 02 Jun 2022 07:19 )   PT: 13.8 sec;   INR: 1.19 ratio         PTT - ( 02 Jun 2022 07:19 )  PTT:26.8 sec            RADIOLOGY & ADDITIONAL TESTS:  Results Reviewed:   Imaging Personally Reviewed:  Electrocardiogram Personally Reviewed:    COORDINATION OF CARE:  Care Discussed with Consultants/Other Providers [Y/N]: Y  Prior or Outpatient Records Reviewed [Y/N]: Y   PROGRESS NOTE:     CONTACT INFO:  Du Landin MD | PGY-1  Pager: [phone] [place], [address] [facility]  Also on TEAMS  After 7pm page night float  On weekends after 1pm check resident assignment tab to see who is covering      Patient is a 86y old  Female who presents with a chief complaint of Terminal Ileitis (02 Jun 2022 09:04)      SUBJECTIVE / OVERNIGHT EVENTS:    - No acute events overnight.   - No fevers/chills.  - Patient seen and evaluated at bedside.  - Denies SOB at rest, chest pain, palpitations, abdominal pain, nausea/vomiting    ADDITIONAL REVIEW OF SYSTEMS:    MEDICATIONS  (STANDING):  cefepime   IVPB 1000 milliGRAM(s) IV Intermittent every 12 hours  dextrose 5%. 1000 milliLiter(s) (100 mL/Hr) IV Continuous <Continuous>  dextrose 5%. 1000 milliLiter(s) (50 mL/Hr) IV Continuous <Continuous>  dextrose 50% Injectable 25 Gram(s) IV Push once  dextrose 50% Injectable 12.5 Gram(s) IV Push once  dextrose 50% Injectable 25 Gram(s) IV Push once  enoxaparin Injectable 40 milliGRAM(s) SubCutaneous every 24 hours  glucagon  Injectable 1 milliGRAM(s) IntraMuscular once  hydrocortisone 1% Cream 1 Application(s) Topical two times a day  insulin glargine Injectable (LANTUS) 5 Unit(s) SubCutaneous at bedtime  insulin lispro (ADMELOG) corrective regimen sliding scale   SubCutaneous three times a day before meals  insulin lispro (ADMELOG) corrective regimen sliding scale   SubCutaneous at bedtime  insulin lispro Injectable (ADMELOG) 4 Unit(s) SubCutaneous three times a day before meals  metroNIDAZOLE  IVPB 500 milliGRAM(s) IV Intermittent every 12 hours  mirtazapine 7.5 milliGRAM(s) Oral at bedtime    MEDICATIONS  (PRN):  acetaminophen     Tablet .. 650 milliGRAM(s) Oral every 6 hours PRN Temp greater or equal to 38C (100.4F), Mild Pain (1 - 3), Moderate Pain (4 - 6)  aluminum hydroxide/magnesium hydroxide/simethicone Suspension 30 milliLiter(s) Oral every 4 hours PRN Dyspepsia  dextrose Oral Gel 15 Gram(s) Oral once PRN Blood Glucose LESS THAN 70 milliGRAM(s)/deciliter  melatonin 3 milliGRAM(s) Oral at bedtime PRN Insomnia      CAPILLARY BLOOD GLUCOSE      POCT Blood Glucose.: 102 mg/dL (02 Jun 2022 22:00)  POCT Blood Glucose.: 82 mg/dL (02 Jun 2022 17:32)  POCT Blood Glucose.: 104 mg/dL (02 Jun 2022 11:46)  POCT Blood Glucose.: 111 mg/dL (02 Jun 2022 08:51)    I&O's Summary    02 Jun 2022 07:01  -  03 Jun 2022 07:00  --------------------------------------------------------  IN: 650 mL / OUT: 0 mL / NET: 650 mL        PHYSICAL EXAM:  Vital Signs Last 24 Hrs  T(C): 36.8 (03 Jun 2022 05:18), Max: 37.5 (02 Jun 2022 21:27)  T(F): 98.2 (03 Jun 2022 05:18), Max: 99.5 (02 Jun 2022 21:27)  HR: 64 (03 Jun 2022 05:18) (62 - 90)  BP: 146/60 (03 Jun 2022 05:18) (97/31 - 156/63)  BP(mean): --  RR: 18 (03 Jun 2022 05:18) (16 - 20)  SpO2: 97% (03 Jun 2022 05:18) (94% - 97%)      CONSTITUTIONAL: NAD, age-appropriate  HEENT: no neck tenderness   RESPIRATORY: Normal respiratory effort; lungs are clear to auscultation bilaterally  CARDIOVASCULAR: Regular rate and rhythm, normal S1 and S2, no murmur/rub/gallop; No lower extremity edema;  ABDOMEN: no tenderness in abdomen, normoactive bowel sounds  MUSCULOSKELETAL: no clubbing or cyanosis of digits; no joint swelling or tenderness to palpation  PSYCH: A+O to person, place, and time; affect appropriate      LABS:                        11.3   13.38 )-----------( 515      ( 03 Jun 2022 06:46 )             35.3     06-02    139  |  100  |  13  ----------------------------<  110<H>  4.1   |  29  |  0.80    Ca    8.7      02 Jun 2022 07:15  Phos  3.6     06-02  Mg     2.2     06-02    TPro  5.9<L>  /  Alb  2.5<L>  /  TBili  0.3  /  DBili  x   /  AST  18  /  ALT  10  /  AlkPhos  93  06-02    PT/INR - ( 02 Jun 2022 07:19 )   PT: 13.8 sec;   INR: 1.19 ratio         PTT - ( 02 Jun 2022 07:19 )  PTT:26.8 sec            RADIOLOGY & ADDITIONAL TESTS:  Results Reviewed:   Imaging Personally Reviewed:  Electrocardiogram Personally Reviewed:    COORDINATION OF CARE:  Care Discussed with Consultants/Other Providers [Y/N]: Y  Prior or Outpatient Records Reviewed [Y/N]: Y   PROGRESS NOTE:     CONTACT INFO:  Du Landin MD | PGY-1  Pager: [phone] [place], [address] [facility]  Also on TEAMS  After 7pm page night float  On weekends after 1pm check resident assignment tab to see who is covering      Patient is a 86y old  Female who presents with a chief complaint of Terminal Ileitis (02 Jun 2022 09:04)      SUBJECTIVE / OVERNIGHT EVENTS:    - No acute events overnight.   - No fevers/chills.  - Patient seen and evaluated at bedside.  - Says she's going to the bathroom 4x a day, could be secondary to drinking PREP   - Denies SOB at rest, chest pain, palpitations, abdominal pain, nausea/vomiting    ADDITIONAL REVIEW OF SYSTEMS:    MEDICATIONS  (STANDING):  cefepime   IVPB 1000 milliGRAM(s) IV Intermittent every 12 hours  dextrose 5%. 1000 milliLiter(s) (100 mL/Hr) IV Continuous <Continuous>  dextrose 5%. 1000 milliLiter(s) (50 mL/Hr) IV Continuous <Continuous>  dextrose 50% Injectable 25 Gram(s) IV Push once  dextrose 50% Injectable 12.5 Gram(s) IV Push once  dextrose 50% Injectable 25 Gram(s) IV Push once  enoxaparin Injectable 40 milliGRAM(s) SubCutaneous every 24 hours  glucagon  Injectable 1 milliGRAM(s) IntraMuscular once  hydrocortisone 1% Cream 1 Application(s) Topical two times a day  insulin glargine Injectable (LANTUS) 5 Unit(s) SubCutaneous at bedtime  insulin lispro (ADMELOG) corrective regimen sliding scale   SubCutaneous three times a day before meals  insulin lispro (ADMELOG) corrective regimen sliding scale   SubCutaneous at bedtime  insulin lispro Injectable (ADMELOG) 4 Unit(s) SubCutaneous three times a day before meals  metroNIDAZOLE  IVPB 500 milliGRAM(s) IV Intermittent every 12 hours  mirtazapine 7.5 milliGRAM(s) Oral at bedtime    MEDICATIONS  (PRN):  acetaminophen     Tablet .. 650 milliGRAM(s) Oral every 6 hours PRN Temp greater or equal to 38C (100.4F), Mild Pain (1 - 3), Moderate Pain (4 - 6)  aluminum hydroxide/magnesium hydroxide/simethicone Suspension 30 milliLiter(s) Oral every 4 hours PRN Dyspepsia  dextrose Oral Gel 15 Gram(s) Oral once PRN Blood Glucose LESS THAN 70 milliGRAM(s)/deciliter  melatonin 3 milliGRAM(s) Oral at bedtime PRN Insomnia      CAPILLARY BLOOD GLUCOSE      POCT Blood Glucose.: 102 mg/dL (02 Jun 2022 22:00)  POCT Blood Glucose.: 82 mg/dL (02 Jun 2022 17:32)  POCT Blood Glucose.: 104 mg/dL (02 Jun 2022 11:46)  POCT Blood Glucose.: 111 mg/dL (02 Jun 2022 08:51)    I&O's Summary    02 Jun 2022 07:01  -  03 Jun 2022 07:00  --------------------------------------------------------  IN: 650 mL / OUT: 0 mL / NET: 650 mL        PHYSICAL EXAM:  Vital Signs Last 24 Hrs  T(C): 36.8 (03 Jun 2022 05:18), Max: 37.5 (02 Jun 2022 21:27)  T(F): 98.2 (03 Jun 2022 05:18), Max: 99.5 (02 Jun 2022 21:27)  HR: 64 (03 Jun 2022 05:18) (62 - 90)  BP: 146/60 (03 Jun 2022 05:18) (97/31 - 156/63)  BP(mean): --  RR: 18 (03 Jun 2022 05:18) (16 - 20)  SpO2: 97% (03 Jun 2022 05:18) (94% - 97%)      CONSTITUTIONAL: NAD, age-appropriate  HEENT: no neck tenderness   RESPIRATORY: Normal respiratory effort; lungs are clear to auscultation bilaterally  CARDIOVASCULAR: Regular rate and rhythm, normal S1 and S2, no murmur/rub/gallop; No lower extremity edema;  ABDOMEN: no tenderness in abdomen, normoactive bowel sounds  MUSCULOSKELETAL: no clubbing or cyanosis of digits; no joint swelling or tenderness to palpation  PSYCH: A+O to person, place, and time; affect appropriate      LABS:                        11.3   13.38 )-----------( 515      ( 03 Jun 2022 06:46 )             35.3     06-02    139  |  100  |  13  ----------------------------<  110<H>  4.1   |  29  |  0.80    Ca    8.7      02 Jun 2022 07:15  Phos  3.6     06-02  Mg     2.2     06-02    TPro  5.9<L>  /  Alb  2.5<L>  /  TBili  0.3  /  DBili  x   /  AST  18  /  ALT  10  /  AlkPhos  93  06-02    PT/INR - ( 02 Jun 2022 07:19 )   PT: 13.8 sec;   INR: 1.19 ratio         PTT - ( 02 Jun 2022 07:19 )  PTT:26.8 sec            RADIOLOGY & ADDITIONAL TESTS:  Results Reviewed:   Imaging Personally Reviewed:  Electrocardiogram Personally Reviewed:    COORDINATION OF CARE:  Care Discussed with Consultants/Other Providers [Y/N]: Y  Prior or Outpatient Records Reviewed [Y/N]: Y

## 2022-06-03 NOTE — PROGRESS NOTE ADULT - PROBLEM SELECTOR PLAN 4
DVT prophylaxis: lovenox daily   Diet: liquid diet and NPO@[place]  Dispo: PT recommend Home PT DVT prophylaxis: lovenox daily   Diet: CC diet   Dispo: PT recommend Home PT

## 2022-06-03 NOTE — PROGRESS NOTE ADULT - PROBLEM SELECTOR PLAN 2
Admitted with severe sepsis (Tmax 101.2, leukocytosis, tachycardic to 116-123, 15.3% bands, lactate to 3.7); s/p total 1.5 L   - CT angio revealed distal terminal ileitis and concern for small intramural abscess   - CXR clear, UA negative, pending BCX  - GI PCR (-)tive  - f/u C. diff   - c/w zosyn (5/24 - )  - To discontinue zosyn after colonoscopy   - BCx 5/23 grew 1 out of 2 bottles CoNS; repeat BCx 4/24 NGTD, likely contaminant, will not treat Admitted with severe sepsis (Tmax 101.2, leukocytosis, tachycardic to 116-123, 15.3% bands, lactate to 3.7); s/p total 1.5 L   - CT angio revealed distal terminal ileitis and concern for small intramural abscess   - CXR clear, UA negative, pending BCX  - GI PCR (-)tive  - f/u C. diff   - c/w cefepime and flagyl   - BCx 5/23 grew 1 out of 2 bottles CoNS; repeat BCx 4/24 NGTD, likely contaminant, will not treat Admitted with severe sepsis (Tmax 101.2, leukocytosis, tachycardic to 116-123, 15.3% bands, lactate to 3.7); s/p total 1.5 L   - CT angio revealed distal terminal ileitis and concern for small intramural abscess   - CXR clear, UA negative, pending BCX  - GI PCR (-)tive  - f/u C. diff   - c/w cefepime and flagyl   - ID following (Benson Mcgraw), pending recs   - BCx 5/23 grew 1 out of 2 bottles CoNS; repeat BCx 4/24 NGTD, likely contaminant, will not treat

## 2022-06-03 NOTE — PROGRESS NOTE ADULT - ASSESSMENT
86F presenting w/ abd pain. Found to have inflammation and possible abscess in terminal ileum. No chronic GI symptoms at baseline.    Impression:  # Terminal ileitis  # Small loculated ascites  # Abnormal mucosa in cecum  # Descending colon polyp  Worsening findings on CT. Still favor infectious cause given onset but cannot rule out inflammatory or malignant ileitis. Unclear etiology or relation of loculated ascites to terminal ileitis.  Colonoscopy performed 6/2/2022 most notable for normal terminal ileum s/p biopsy, possible abnormal mucosa in cecum s/p biopsy, one small polyp in descending colon s/p resection.    Recommendations:  -trend vitals, CBC, and monitor for clinical signs of bleeding  -maintain active type and screen  -transfusion goal to maintain hemoglobin >/= 7.0 and platelets >/= 50  -f/u path  -for loculated ascites, consider possible IR aspiration for diagnostic purposes  -no further plans for anticipated inpatient GI interventions    Recommendations incomplete until finalized by attending signature/attestation to note.    Heriberto Atkinson, PGY-4  GI/Hepatology Fellow    MONDAY-FRIDAY 8AM-5PM:  Pager# [phone] ([facility]) or [phone] ([facility])    NON-URGENT CONSULTS:  Please email [email or web address] OR [email or web address]  AT NIGHT AND ON WEEKENDS:  Contact on-call GI fellow via answering service ([phone]) from 5pm-8am and on weekends/holidays

## 2022-06-03 NOTE — PROGRESS NOTE ADULT - PROBLEM SELECTOR PLAN 1
i/s/o severe sepsis   - CT angio revealed distal terminal ileitis and c/f small intramural abscess  - surgery consulted; possible surgery depending on results of colonoscopy   - GI consulted; colonoscopy tomorrow  - CT abdomen scan demonstrate worsening distal ileitis. No intramural abscess. Small loculated ascites.   - repeat CT abdomen on Friday, pending OR depending on results of colonoscopy    - Lactate (suspicious for ischemia) and coags in AM  - NPO@MN i/s/o severe sepsis   - CT angio revealed distal terminal ileitis and c/f small intramural abscess  - surgery consulted; possible surgery depending on results of colonoscopy   - s/p colonoscopy, no indication for further current workup   - CT abdomen scan demonstrate worsening distal ileitis. No intramural abscess. Small loculated ascites.   - advancing diet

## 2022-06-03 NOTE — PROGRESS NOTE ADULT - PROBLEM SELECTOR PLAN 3
Per Pharmacy, patient takes prandin and amaryl at home   - hold prandin and amaryl during inpatient   - May start metformin 500mg QD outpatient    - A1C 9.3  - c/w lantus 5U and mealtime 4U; titrate to 100-180 FS  - c/w DIGNA/FS qac, qhs Per Pharmacy, patient takes prandin and amaryl at home   - hold prandin and amaryl during inpatient   - Start metformin 500mg QD on discharge     - A1C 9.3  - c/w lantus 5U and mealtime 4U; titrate to 100-180 FS  - c/w DIGNA/FS qac, qhs

## 2022-06-03 NOTE — PROGRESS NOTE ADULT - ATTENDING COMMENTS
Patient seen and evaluated with son at [facility]. Patient with chronic diarrhea, sweating. :Never prior colonoscopy, mammogram. No flushing per patient. No murmur on exam. Initially thought on cardiopulmonary exam felt lump in chest, had PCA, Angela villalobosarone a breast exam with permission of patient, no masses palpated.     #Terminal ileitis  -colonoscopy with normal TI despite imaging denoting ileuitis  -unclear etiology. Patient's abodminal pain improving but poor appetite and is not eating with persistent diarhea.   -await pathology of cecum lesion, given sewating, chronic diarrhea and ileitis want to make sure r/o carcinoid.   -continue Abx for 7 day course, personally discussed with DR. Romero.   -encourage PO. If worsens abdominal pain, CT with PO/IV contrast vs. MR to better assess TI.  -appreciate surgery, no intervention at this time.       #RUQ pain   -Patient continues to endorse RUQ pain, unclear etiology   -RUQ U/S revealed gall bladder sludge, some edema in gall bladder well in the setting of ascites   -Meckel scan (5/27) negative for ectopic tissue  -Repeat CT abd/pelvis w/ worsening terminal ileitis  -see plan as above     #Depression  -Patient endorsed depression and passive SI   -Behavioral health consulted  -started on remeron qhs     #T2DM  -Hx of T2DM, home medicaitons include prandin and amaryl, had been on metformin but unclear why discontinued   -lss, monitor FS before meals and at bedtime  -carb consistent diet   -c/w lantus 5 U qhs and admelog 4 U TID premeal (basal halved as will be NPO after MN)  -A1c 9.3%, will likely need to start insulin therapy and f/u outpatient endocrine     Dispo: pending colonoscopy  PT recommend home w/ home PT. Patient seen and evaluated with son at [facility]. Patient with chronic diarrhea, sweating. :Never prior colonoscopy, mammogram. No flushing per patient. No murmur on exam. Initially thought on cardiopulmonary exam felt lump in chest, had PCA, Angela corona a breast exam with permission of patient, no masses palpated. Right middle abdomen more painful than RLQ on exam.     #Terminal ileitis  -colonoscopy with normal TI despite imaging denoting ileuitis  -unclear etiology. Patient's abodminal pain improving but poor appetite and is not eating with persistent diarhea.   -await pathology of cecum lesion, given sewating, chronic diarrhea and ileitis want to make sure r/o carcinoid.   -continue Abx for 7 day course, personally discussed with DR. Romero.   -encourage PO. If worsens abdominal pain, CT with PO/IV contrast vs. MR to better assess TI.  -appreciate surgery, no intervention at this time.       #RUQ pain   -Patient continues to endorse RUQ pain, unclear etiology   -RUQ U/S revealed gall bladder sludge, some edema in gall bladder well in the setting of ascites   -Meckel scan (5/27) negative for ectopic tissue  -Repeat CT abd/pelvis w/ worsening terminal ileitis  -see plan as above     #Depression  -Patient endorsed depression and passive SI   -Behavioral health consulted  -started on remeron qhs     #T2DM  -Hx of T2DM, home medicaitons include prandin and amaryl, had been on metformin but unclear why discontinued   -lss, monitor FS before meals and at bedtime  -carb consistent diet   -c/w lantus 5 U qhs and admelog 4 U TID premeal (basal halved as will be NPO after MN)  -A1c 9.3%, will likely need to start insulin therapy and f/u outpatient endocrine     Dispo: pending colonoscopy  PT recommend home w/ home PT.

## 2022-06-03 NOTE — PROGRESS NOTE ADULT - ATTENDING COMMENTS
Doing well post procedure yesterday  Colonoscopy with relatively normal looking TI  No clear ileitis  Unclear if this is resolving infectious cause given persistent loose stools    Plan  - F/u Path  - Diet as tolerated  - If pain free and tolerating foods, no barriers to discharge from GI standpoint

## 2022-06-04 LAB
ALBUMIN SERPL ELPH-MCNC: 2.4 G/DL — LOW (ref 3.3–5)
ALP SERPL-CCNC: 76 U/L — SIGNIFICANT CHANGE UP (ref 40–120)
ALT FLD-CCNC: 10 U/L — SIGNIFICANT CHANGE UP (ref 10–45)
ANION GAP SERPL CALC-SCNC: 8 MMOL/L — SIGNIFICANT CHANGE UP (ref 5–17)
AST SERPL-CCNC: 20 U/L — SIGNIFICANT CHANGE UP (ref 10–40)
BILIRUB SERPL-MCNC: 0.3 MG/DL — SIGNIFICANT CHANGE UP (ref 0.2–1.2)
BUN SERPL-MCNC: 9 MG/DL — SIGNIFICANT CHANGE UP (ref 7–23)
CALCIUM SERPL-MCNC: 8.8 MG/DL — SIGNIFICANT CHANGE UP (ref 8.4–10.5)
CHLORIDE SERPL-SCNC: 102 MMOL/L — SIGNIFICANT CHANGE UP (ref 96–108)
CO2 SERPL-SCNC: 26 MMOL/L — SIGNIFICANT CHANGE UP (ref 22–31)
CREAT SERPL-MCNC: 0.68 MG/DL — SIGNIFICANT CHANGE UP (ref 0.5–1.3)
EGFR: 85 ML/MIN/1.73M2 — SIGNIFICANT CHANGE UP
GLUCOSE BLDC GLUCOMTR-MCNC: 102 MG/DL — HIGH (ref 70–99)
GLUCOSE BLDC GLUCOMTR-MCNC: 148 MG/DL — HIGH (ref 70–99)
GLUCOSE BLDC GLUCOMTR-MCNC: 195 MG/DL — HIGH (ref 70–99)
GLUCOSE BLDC GLUCOMTR-MCNC: 273 MG/DL — HIGH (ref 70–99)
GLUCOSE SERPL-MCNC: 196 MG/DL — HIGH (ref 70–99)
MAGNESIUM SERPL-MCNC: 2 MG/DL — SIGNIFICANT CHANGE UP (ref 1.6–2.6)
PHOSPHATE SERPL-MCNC: 2.8 MG/DL — SIGNIFICANT CHANGE UP (ref 2.5–4.5)
POTASSIUM SERPL-MCNC: 4.2 MMOL/L — SIGNIFICANT CHANGE UP (ref 3.5–5.3)
POTASSIUM SERPL-SCNC: 4.2 MMOL/L — SIGNIFICANT CHANGE UP (ref 3.5–5.3)
PROT SERPL-MCNC: 5.7 G/DL — LOW (ref 6–8.3)
SODIUM SERPL-SCNC: 136 MMOL/L — SIGNIFICANT CHANGE UP (ref 135–145)

## 2022-06-04 PROCEDURE — 99233 SBSQ HOSP IP/OBS HIGH 50: CPT | Mod: GC

## 2022-06-04 RX ADMIN — Medication 1: at 09:22

## 2022-06-04 RX ADMIN — MIRTAZAPINE 7.5 MILLIGRAM(S): 45 TABLET, ORALLY DISINTEGRATING ORAL at 21:30

## 2022-06-04 RX ADMIN — Medication 4 UNIT(S): at 18:28

## 2022-06-04 RX ADMIN — Medication 1 APPLICATION(S): at 18:21

## 2022-06-04 RX ADMIN — Medication 100 MILLIGRAM(S): at 18:20

## 2022-06-04 RX ADMIN — INSULIN GLARGINE 5 UNIT(S): 100 INJECTION, SOLUTION SUBCUTANEOUS at 21:30

## 2022-06-04 RX ADMIN — CEFEPIME 100 MILLIGRAM(S): 1 INJECTION, POWDER, FOR SOLUTION INTRAMUSCULAR; INTRAVENOUS at 18:20

## 2022-06-04 RX ADMIN — ENOXAPARIN SODIUM 40 MILLIGRAM(S): 100 INJECTION SUBCUTANEOUS at 18:21

## 2022-06-04 RX ADMIN — Medication 3: at 12:51

## 2022-06-04 RX ADMIN — Medication 4 UNIT(S): at 12:52

## 2022-06-04 RX ADMIN — Medication 1 APPLICATION(S): at 05:41

## 2022-06-04 RX ADMIN — CEFEPIME 100 MILLIGRAM(S): 1 INJECTION, POWDER, FOR SOLUTION INTRAMUSCULAR; INTRAVENOUS at 05:41

## 2022-06-04 RX ADMIN — Medication 100 MILLIGRAM(S): at 05:41

## 2022-06-04 RX ADMIN — Medication 4 UNIT(S): at 09:23

## 2022-06-04 NOTE — PROGRESS NOTE ADULT - PROBLEM SELECTOR PLAN 5
- Patient verbalized that she "just wants to end it"  - Expresses sadness that friends and family have either  or moved away   - Thought about overdosing on medications   - Psych consulted, appreciate recs   - c/w remeron 7.5mg PO Qhs   - dc jimbo XL

## 2022-06-04 NOTE — PROGRESS NOTE ADULT - SUBJECTIVE AND OBJECTIVE BOX
PROGRESS NOTE:   Authored by Dr. Danilo Tidwell MD Pager [phone] [facility], [place] [address]    Patient is a 86y old  Female who presents with a chief complaint of Terminal Ileitis (03 Jun 2022 19:11)      SUBJECTIVE / OVERNIGHT EVENTS:    ADDITIONAL REVIEW OF SYSTEMS:    MEDICATIONS  (STANDING):  cefepime   IVPB 1000 milliGRAM(s) IV Intermittent every 12 hours  dextrose 5%. 1000 milliLiter(s) (100 mL/Hr) IV Continuous <Continuous>  dextrose 5%. 1000 milliLiter(s) (50 mL/Hr) IV Continuous <Continuous>  dextrose 50% Injectable 25 Gram(s) IV Push once  dextrose 50% Injectable 12.5 Gram(s) IV Push once  dextrose 50% Injectable 25 Gram(s) IV Push once  enoxaparin Injectable 40 milliGRAM(s) SubCutaneous every 24 hours  glucagon  Injectable 1 milliGRAM(s) IntraMuscular once  hydrocortisone 1% Cream 1 Application(s) Topical two times a day  insulin glargine Injectable (LANTUS) 5 Unit(s) SubCutaneous at bedtime  insulin lispro (ADMELOG) corrective regimen sliding scale   SubCutaneous three times a day before meals  insulin lispro (ADMELOG) corrective regimen sliding scale   SubCutaneous at bedtime  insulin lispro Injectable (ADMELOG) 4 Unit(s) SubCutaneous three times a day before meals  metroNIDAZOLE  IVPB 500 milliGRAM(s) IV Intermittent every 12 hours  mirtazapine 7.5 milliGRAM(s) Oral at bedtime    MEDICATIONS  (PRN):  acetaminophen     Tablet .. 650 milliGRAM(s) Oral every 6 hours PRN Temp greater or equal to 38C (100.4F), Mild Pain (1 - 3), Moderate Pain (4 - 6)  aluminum hydroxide/magnesium hydroxide/simethicone Suspension 30 milliLiter(s) Oral every 4 hours PRN Dyspepsia  dextrose Oral Gel 15 Gram(s) Oral once PRN Blood Glucose LESS THAN 70 milliGRAM(s)/deciliter  melatonin 3 milliGRAM(s) Oral at bedtime PRN Insomnia      CAPILLARY BLOOD GLUCOSE      POCT Blood Glucose.: 207 mg/dL (03 Jun 2022 21:53)  POCT Blood Glucose.: 232 mg/dL (03 Jun 2022 19:03)  POCT Blood Glucose.: 201 mg/dL (03 Jun 2022 17:36)  POCT Blood Glucose.: 277 mg/dL (03 Jun 2022 12:04)  POCT Blood Glucose.: 98 mg/dL (03 Jun 2022 09:37)  POCT Blood Glucose.: 89 mg/dL (03 Jun 2022 09:08)  POCT Blood Glucose.: 70 mg/dL (03 Jun 2022 08:36)  POCT Blood Glucose.: 67 mg/dL (03 Jun 2022 08:34)    I&O's Summary    03 Jun 2022 07:01  -  04 Jun 2022 07:00  --------------------------------------------------------  IN: 400 mL / OUT: 0 mL / NET: 400 mL        PHYSICAL EXAM:  Vital Signs Last 24 Hrs  T(C): 36.7 (04 Jun 2022 05:21), Max: 36.9 (03 Jun 2022 12:10)  T(F): 98.1 (04 Jun 2022 05:21), Max: 98.4 (03 Jun 2022 12:10)  HR: 68 (04 Jun 2022 05:21) (64 - 70)  BP: 163/72 (04 Jun 2022 05:21) (148/62 - 163/72)  BP(mean): --  RR: 18 (04 Jun 2022 05:21) (18 - 18)  SpO2: 96% (04 Jun 2022 05:21) (96% - 96%)  GENERAL: NAD, lying comfortably in bed  HEAD: Atraumatic, normocephalic  EYES: EOMI b/l PERRLA b/l, conjunctiva and sclera clear  NECK: Supple, No JVD, No LAD  RESPIRATORY: Normal respiratory effort; lungs are clear to auscultation bilaterally  CARDIOVASCULAR: Regular rate and rhythm, normal S1 and S2, no murmur/rub/gallop; No lower extremity edema; Peripheral pulses are 2+ bilaterally  ABDOMEN: Nontender to palpation, normoactive bowel sounds, no rebound/guarding; No hepatosplenomegaly  MUSCLOSKELETAL: no clubbing or cyanosis of digits; no joint swelling or tenderness to palpation  NEURO: Non focal   PSYCH: A+O to person, place, and time; affect appropriate    LABS:                        11.3   13.38 )-----------( 515      ( 03 Jun 2022 06:46 )             35.3     06-03    139  |  101  |  11  ----------------------------<  65<L>  4.2   |  28  |  0.79    Ca    8.5      03 Jun 2022 06:50  Phos  3.5     06-03  Mg     2.1     06-03    TPro  5.8<L>  /  Alb  2.5<L>  /  TBili  0.2  /  DBili  x   /  AST  20  /  ALT  10  /  AlkPhos  82  06-03    PT/INR - ( 02 Jun 2022 07:19 )   PT: 13.8 sec;   INR: 1.19 ratio         PTT - ( 02 Jun 2022 07:19 )  PTT:26.8 sec            Tele Reviewed:    RADIOLOGY & ADDITIONAL TESTS:  Results Reviewed:   Imaging Personally Reviewed:  Electrocardiogram Personally Reviewed:    COORDINATION OF CARE:  Care Discussed with Consultants/Other Providers [Y/N]:  Prior or Outpatient Records Reviewed [Y/N]:   PROGRESS NOTE:   Authored by Dr. Danilo Tidwell MD Pager [phone] [facility], [place] [address]    Patient is a 86y old  Female who presents with a chief complaint of Terminal Ileitis (03 Jun 2022 19:11)      SUBJECTIVE / OVERNIGHT EVENTS: no overnight events, wondering when she can go home. no reporteded diarrhea although claims that it is largely liquid, tolerating regular diet    ADDITIONAL REVIEW OF SYSTEMS: Patient denies fevers, chills, chest pain, shortness of breath, nausea, abdominal pain, diarrhea, constipation, dysuria, leg swelling, headache, light headedness    MEDICATIONS  (STANDING):  cefepime   IVPB 1000 milliGRAM(s) IV Intermittent every 12 hours  dextrose 5%. 1000 milliLiter(s) (100 mL/Hr) IV Continuous <Continuous>  dextrose 5%. 1000 milliLiter(s) (50 mL/Hr) IV Continuous <Continuous>  dextrose 50% Injectable 25 Gram(s) IV Push once  dextrose 50% Injectable 12.5 Gram(s) IV Push once  dextrose 50% Injectable 25 Gram(s) IV Push once  enoxaparin Injectable 40 milliGRAM(s) SubCutaneous every 24 hours  glucagon  Injectable 1 milliGRAM(s) IntraMuscular once  hydrocortisone 1% Cream 1 Application(s) Topical two times a day  insulin glargine Injectable (LANTUS) 5 Unit(s) SubCutaneous at bedtime  insulin lispro (ADMELOG) corrective regimen sliding scale   SubCutaneous three times a day before meals  insulin lispro (ADMELOG) corrective regimen sliding scale   SubCutaneous at bedtime  insulin lispro Injectable (ADMELOG) 4 Unit(s) SubCutaneous three times a day before meals  metroNIDAZOLE  IVPB 500 milliGRAM(s) IV Intermittent every 12 hours  mirtazapine 7.5 milliGRAM(s) Oral at bedtime    MEDICATIONS  (PRN):  acetaminophen     Tablet .. 650 milliGRAM(s) Oral every 6 hours PRN Temp greater or equal to 38C (100.4F), Mild Pain (1 - 3), Moderate Pain (4 - 6)  aluminum hydroxide/magnesium hydroxide/simethicone Suspension 30 milliLiter(s) Oral every 4 hours PRN Dyspepsia  dextrose Oral Gel 15 Gram(s) Oral once PRN Blood Glucose LESS THAN 70 milliGRAM(s)/deciliter  melatonin 3 milliGRAM(s) Oral at bedtime PRN Insomnia      CAPILLARY BLOOD GLUCOSE      POCT Blood Glucose.: 207 mg/dL (03 Jun 2022 21:53)  POCT Blood Glucose.: 232 mg/dL (03 Jun 2022 19:03)  POCT Blood Glucose.: 201 mg/dL (03 Jun 2022 17:36)  POCT Blood Glucose.: 277 mg/dL (03 Jun 2022 12:04)  POCT Blood Glucose.: 98 mg/dL (03 Jun 2022 09:37)  POCT Blood Glucose.: 89 mg/dL (03 Jun 2022 09:08)  POCT Blood Glucose.: 70 mg/dL (03 Jun 2022 08:36)  POCT Blood Glucose.: 67 mg/dL (03 Jun 2022 08:34)    I&O's Summary    03 Jun 2022 07:01  -  04 Jun 2022 07:00  --------------------------------------------------------  IN: 400 mL / OUT: 0 mL / NET: 400 mL        PHYSICAL EXAM:  Vital Signs Last 24 Hrs  T(C): 36.7 (04 Jun 2022 05:21), Max: 36.9 (03 Jun 2022 12:10)  T(F): 98.1 (04 Jun 2022 05:21), Max: 98.4 (03 Jun 2022 12:10)  HR: 68 (04 Jun 2022 05:21) (64 - 70)  BP: 163/72 (04 Jun 2022 05:21) (148/62 - 163/72)  BP(mean): --  RR: 18 (04 Jun 2022 05:21) (18 - 18)  SpO2: 96% (04 Jun 2022 05:21) (96% - 96%)  GENERAL: NAD, lying comfortably in bed  HEAD: Atraumatic, normocephalic  EYES: EOMI b/l PERRLA b/l, conjunctiva and sclera clear  NECK: Supple, No JVD, No LAD  RESPIRATORY: Normal respiratory effort; lungs are clear to auscultation bilaterally  CARDIOVASCULAR: Regular rate and rhythm, normal S1 and S2, no murmur/rub/gallop; No lower extremity edema; Peripheral pulses are 2+ bilaterally  ABDOMEN: Nontender to palpation, normoactive bowel sounds, no rebound/guarding; No hepatosplenomegaly  MUSCLOSKELETAL: no clubbing or cyanosis of digits; no joint swelling or tenderness to palpation  NEURO: Non focal   PSYCH: A+O to person, place, and time; affect appropriate    LABS:                        11.3   13.38 )-----------( 515      ( 03 Jun 2022 06:46 )             35.3     06-03    139  |  101  |  11  ----------------------------<  65<L>  4.2   |  28  |  0.79    Ca    8.5      03 Jun 2022 06:50  Phos  3.5     06-03  Mg     2.1     06-03    TPro  5.8<L>  /  Alb  2.5<L>  /  TBili  0.2  /  DBili  x   /  AST  20  /  ALT  10  /  AlkPhos  82  06-03    PT/INR - ( 02 Jun 2022 07:19 )   PT: 13.8 sec;   INR: 1.19 ratio         PTT - ( 02 Jun 2022 07:19 )  PTT:26.8 sec            Tele Reviewed:    RADIOLOGY & ADDITIONAL TESTS:  Results Reviewed:   Imaging Personally Reviewed:  Electrocardiogram Personally Reviewed:    COORDINATION OF CARE:  Care Discussed with Consultants/Other Providers [Y/N]:  Prior or Outpatient Records Reviewed [Y/N]:

## 2022-06-04 NOTE — PROGRESS NOTE ADULT - PROBLEM SELECTOR PLAN 3
Per Pharmacy, patient takes prandin and amaryl at home   - hold prandin and amaryl during inpatient   - Start metformin 500mg QD on discharge     - A1C 9.3  - c/w lantus 5U and mealtime 4U; titrate to 100-180 FS  - c/w DIGNA/FS qac, qhs

## 2022-06-04 NOTE — PROGRESS NOTE ADULT - ATTENDING COMMENTS
No events overnight. Had liquids last night, had 1/2 oatmeal for breakfast without pain. Abdominal exam improving.       #Terminal ileitis  -colonoscopy with normal TI despite imaging denoting ileuitis  -unclear etiology. Patient's abodminal pain improving but poor appetite and is not eating with persistent diarhea.   -await pathology of cecum lesion, given sewating, chronic diarrhea and ileitis want to make sure r/o carcinoid. Send 5-HIAA.   -continue Abx for 7 day course, personally discussed with DR. Romero.  -no mention of foreign body on CT scan.   -encourage PO. If worsens abdominal pain, CT with PO/IV contrast vs. MR to better assess TI.  -appreciate surgery, no intervention at this time.   -if continues to improve will d/c with close outpatietn follow up next 24-48 hours.       #RUQ pain   -Patient continues to endorse RUQ pain, unclear etiology   -RUQ U/S revealed gall bladder sludge, some edema in gall bladder well in the setting of ascites   -Meckel scan (5/27) negative for ectopic tissue  -Repeat CT abd/pelvis w/ worsening terminal ileitis  -see plan as above     #Depression  -Patient endorsed depression and passive SI   -Behavioral health consulted  -started on remeron qhs     #T2DM  -Hx of T2DM, home medicaitons include prandin and amaryl, had been on metformin but unclear why discontinued   -lss, monitor FS before meals and at bedtime  -carb consistent diet   -c/w lantus 5 U qhs and admelog 4 U TID premeal (basal halved as will be NPO after MN)  -A1c 9.3%, will likely need to start insulin therapy and f/u outpatient endocrine     Dispo: pending colonoscopy  PT recommend home w/ home PT.

## 2022-06-04 NOTE — PROGRESS NOTE ADULT - PROBLEM SELECTOR PLAN 2
Admitted with severe sepsis (Tmax 101.2, leukocytosis, tachycardic to 116-123, 15.3% bands, lactate to 3.7); s/p total 1.5 L   - CT angio revealed distal terminal ileitis and concern for small intramural abscess   - CXR clear, UA negative, pending BCX  - GI PCR (-)tive  - f/u C. diff   - c/w cefepime and flagyl   - ID following (Benson Mcgraw), pending recs   - BCx 5/23 grew 1 out of 2 bottles CoNS; repeat BCx 4/24 NGTD, likely contaminant, will not treat

## 2022-06-04 NOTE — PROGRESS NOTE ADULT - PROBLEM SELECTOR PLAN 1
i/s/o severe sepsis   - CT angio revealed distal terminal ileitis and c/f small intramural abscess  - surgery consulted; possible surgery depending on results of colonoscopy   - s/p colonoscopy, no indication for further current workup   - CT abdomen scan demonstrate worsening distal ileitis. No intramural abscess. Small loculated ascites.   - advancing diet i/s/o severe sepsis   - CT angio revealed distal terminal ileitis and c/f small intramural abscess  - surgery consulted; possible surgery depending on results of colonoscopy   - s/p colonoscopy, no indication for further current workup   - CT abdomen scan demonstrate worsening distal ileitis. No intramural abscess. Small loculated ascites.   - advancing diet  - f/u path  - sending vasculitis labs and hepatitis labs, possible carcinoid in the setting of chronic diarrhea and sweats as an outpatient, possible microperforation although still stable abdominal exam

## 2022-06-04 NOTE — PROGRESS NOTE ADULT - ASSESSMENT
86F w/ PMH T2DM who presents w/ abdominal and shoulder pain, found to have severe sepsis int he setting of terminal ileitis and possible intramural abscess as seen on CTA. Meckel scan negative. S/p CT scan, to consult surgery as to further management. Now status post colonoscopy, pending discharge.  86F w/ PMH T2DM who presents w/ abdominal and shoulder pain, found to have severe sepsis int he setting of terminal ileitis and possible intramural abscess as seen on CTA. Meckel scan negative. S/p CT scan, to consult surgery as to further management. Now status post colonoscopy, pending pathology

## 2022-06-05 LAB
ALBUMIN SERPL ELPH-MCNC: 2.4 G/DL — LOW (ref 3.3–5)
ALP SERPL-CCNC: 88 U/L — SIGNIFICANT CHANGE UP (ref 40–120)
ALT FLD-CCNC: 16 U/L — SIGNIFICANT CHANGE UP (ref 10–45)
ANION GAP SERPL CALC-SCNC: 9 MMOL/L — SIGNIFICANT CHANGE UP (ref 5–17)
AST SERPL-CCNC: 36 U/L — SIGNIFICANT CHANGE UP (ref 10–40)
BILIRUB SERPL-MCNC: 0.2 MG/DL — SIGNIFICANT CHANGE UP (ref 0.2–1.2)
BUN SERPL-MCNC: 9 MG/DL — SIGNIFICANT CHANGE UP (ref 7–23)
CALCIUM SERPL-MCNC: 8.8 MG/DL — SIGNIFICANT CHANGE UP (ref 8.4–10.5)
CHLORIDE SERPL-SCNC: 104 MMOL/L — SIGNIFICANT CHANGE UP (ref 96–108)
CO2 SERPL-SCNC: 27 MMOL/L — SIGNIFICANT CHANGE UP (ref 22–31)
CREAT SERPL-MCNC: 0.72 MG/DL — SIGNIFICANT CHANGE UP (ref 0.5–1.3)
EGFR: 81 ML/MIN/1.73M2 — SIGNIFICANT CHANGE UP
GLUCOSE BLDC GLUCOMTR-MCNC: 118 MG/DL — HIGH (ref 70–99)
GLUCOSE BLDC GLUCOMTR-MCNC: 123 MG/DL — HIGH (ref 70–99)
GLUCOSE BLDC GLUCOMTR-MCNC: 136 MG/DL — HIGH (ref 70–99)
GLUCOSE BLDC GLUCOMTR-MCNC: 151 MG/DL — HIGH (ref 70–99)
GLUCOSE BLDC GLUCOMTR-MCNC: 230 MG/DL — HIGH (ref 70–99)
GLUCOSE BLDC GLUCOMTR-MCNC: 88 MG/DL — SIGNIFICANT CHANGE UP (ref 70–99)
GLUCOSE SERPL-MCNC: 126 MG/DL — HIGH (ref 70–99)
HAV IGM SER-ACNC: SIGNIFICANT CHANGE UP
HBV CORE AB SER-ACNC: SIGNIFICANT CHANGE UP
HBV CORE IGM SER-ACNC: SIGNIFICANT CHANGE UP
HBV SURFACE AB SER-ACNC: SIGNIFICANT CHANGE UP
HBV SURFACE AG SER-ACNC: SIGNIFICANT CHANGE UP
HCT VFR BLD CALC: 35.1 % — SIGNIFICANT CHANGE UP (ref 34.5–45)
HCV AB S/CO SERPL IA: 0.12 S/CO — SIGNIFICANT CHANGE UP (ref 0–0.99)
HCV AB SERPL-IMP: SIGNIFICANT CHANGE UP
HGB BLD-MCNC: 11.4 G/DL — LOW (ref 11.5–15.5)
MAGNESIUM SERPL-MCNC: 1.9 MG/DL — SIGNIFICANT CHANGE UP (ref 1.6–2.6)
MCHC RBC-ENTMCNC: 31.2 PG — SIGNIFICANT CHANGE UP (ref 27–34)
MCHC RBC-ENTMCNC: 32.5 GM/DL — SIGNIFICANT CHANGE UP (ref 32–36)
MCV RBC AUTO: 96.2 FL — SIGNIFICANT CHANGE UP (ref 80–100)
NRBC # BLD: 0 /100 WBCS — SIGNIFICANT CHANGE UP (ref 0–0)
PHOSPHATE SERPL-MCNC: 3.1 MG/DL — SIGNIFICANT CHANGE UP (ref 2.5–4.5)
PLATELET # BLD AUTO: 615 K/UL — HIGH (ref 150–400)
POTASSIUM SERPL-MCNC: 4.1 MMOL/L — SIGNIFICANT CHANGE UP (ref 3.5–5.3)
POTASSIUM SERPL-SCNC: 4.1 MMOL/L — SIGNIFICANT CHANGE UP (ref 3.5–5.3)
PROT SERPL-MCNC: 5.7 G/DL — LOW (ref 6–8.3)
RBC # BLD: 3.65 M/UL — LOW (ref 3.8–5.2)
RBC # FLD: 12.2 % — SIGNIFICANT CHANGE UP (ref 10.3–14.5)
SARS-COV-2 RNA SPEC QL NAA+PROBE: SIGNIFICANT CHANGE UP
SODIUM SERPL-SCNC: 140 MMOL/L — SIGNIFICANT CHANGE UP (ref 135–145)
WBC # BLD: 10.41 K/UL — SIGNIFICANT CHANGE UP (ref 3.8–10.5)
WBC # FLD AUTO: 10.41 K/UL — SIGNIFICANT CHANGE UP (ref 3.8–10.5)

## 2022-06-05 PROCEDURE — 99222 1ST HOSP IP/OBS MODERATE 55: CPT

## 2022-06-05 RX ADMIN — Medication 1 APPLICATION(S): at 05:30

## 2022-06-05 RX ADMIN — Medication 4 UNIT(S): at 18:18

## 2022-06-05 RX ADMIN — CEFEPIME 100 MILLIGRAM(S): 1 INJECTION, POWDER, FOR SOLUTION INTRAMUSCULAR; INTRAVENOUS at 05:30

## 2022-06-05 RX ADMIN — Medication 4 UNIT(S): at 14:00

## 2022-06-05 RX ADMIN — Medication 100 MILLIGRAM(S): at 05:30

## 2022-06-05 RX ADMIN — CEFEPIME 100 MILLIGRAM(S): 1 INJECTION, POWDER, FOR SOLUTION INTRAMUSCULAR; INTRAVENOUS at 19:02

## 2022-06-05 RX ADMIN — MIRTAZAPINE 7.5 MILLIGRAM(S): 45 TABLET, ORALLY DISINTEGRATING ORAL at 21:46

## 2022-06-05 RX ADMIN — ENOXAPARIN SODIUM 40 MILLIGRAM(S): 100 INJECTION SUBCUTANEOUS at 19:01

## 2022-06-05 RX ADMIN — Medication 100 MILLIGRAM(S): at 19:02

## 2022-06-05 RX ADMIN — Medication 2: at 13:59

## 2022-06-05 RX ADMIN — Medication 4 UNIT(S): at 09:32

## 2022-06-05 RX ADMIN — INSULIN GLARGINE 5 UNIT(S): 100 INJECTION, SOLUTION SUBCUTANEOUS at 21:46

## 2022-06-05 NOTE — PROGRESS NOTE ADULT - PROBLEM SELECTOR PLAN 2
Admitted with severe sepsis (Tmax 101.2, leukocytosis, tachycardic to 116-123, 15.3% bands, lactate to 3.7); s/p total 1.5 L   - CT angio revealed distal terminal ileitis and concern for small intramural abscess   - CXR clear, UA negative, pending BCX  - GI PCR (-)tive  - f/u C. diff   - c/w cefepime and flagyl   - ID following (Benson Mcgraw), pending recs   - BCx 5/23 grew 1 out of 2 bottles CoNS; repeat BCx 4/24 NGTD, likely contaminant, will not treat Admitted with severe sepsis (Tmax 101.2, leukocytosis, tachycardic to 116-123, 15.3% bands, lactate to 3.7); s/p total 1.5 L   - CT angio revealed distal terminal ileitis and concern for small intramural abscess   - CXR clear, UA negative, pending BCX  - GI PCR (-)tive  - f/u C. diff   - c/w cefepime and flagyl   - ID following (Benson Mcgraw), to terminate Abx upon discharge   - BCx 5/23 grew 1 out of 2 bottles CoNS; repeat BCx 4/24 NGTD, likely contaminant, will not treat

## 2022-06-05 NOTE — PROGRESS NOTE ADULT - ASSESSMENT
86F w/ PMH T2DM who presents w/ abdominal and shoulder pain, found to have severe sepsis int he setting of terminal ileitis and possible intramural abscess as seen on CTA. Meckel scan negative. S/p CT scan, to consult surgery as to further management. Now status post colonoscopy, pending pathology

## 2022-06-05 NOTE — PROGRESS NOTE ADULT - SUBJECTIVE AND OBJECTIVE BOX
PROGRESS NOTE:     CONTACT INFO:  Du Landin MD | PGY-1  Pager: [phone] [place], [id number] [facility]  Also on TEAMS  After 7pm page night float  On weekends after 1pm check resident assignment tab to see who is covering      Patient is a 86y old  Female who presents with a chief complaint of Terminal Ileitis (04 Jun 2022 07:08)      SUBJECTIVE / OVERNIGHT EVENTS:    - No acute events overnight.   - No fevers/chills.  - Patient seen and evaluated at bedside.  - Denies SOB at rest, chest pain, palpitations, abdominal pain, nausea/vomiting    ADDITIONAL REVIEW OF SYSTEMS:    MEDICATIONS  (STANDING):  cefepime   IVPB 1000 milliGRAM(s) IV Intermittent every 12 hours  dextrose 5%. 1000 milliLiter(s) (100 mL/Hr) IV Continuous <Continuous>  dextrose 5%. 1000 milliLiter(s) (50 mL/Hr) IV Continuous <Continuous>  dextrose 50% Injectable 25 Gram(s) IV Push once  dextrose 50% Injectable 12.5 Gram(s) IV Push once  dextrose 50% Injectable 25 Gram(s) IV Push once  enoxaparin Injectable 40 milliGRAM(s) SubCutaneous every 24 hours  glucagon  Injectable 1 milliGRAM(s) IntraMuscular once  hydrocortisone 1% Cream 1 Application(s) Topical two times a day  insulin glargine Injectable (LANTUS) 5 Unit(s) SubCutaneous at bedtime  insulin lispro (ADMELOG) corrective regimen sliding scale   SubCutaneous three times a day before meals  insulin lispro (ADMELOG) corrective regimen sliding scale   SubCutaneous at bedtime  insulin lispro Injectable (ADMELOG) 4 Unit(s) SubCutaneous three times a day before meals  metroNIDAZOLE  IVPB 500 milliGRAM(s) IV Intermittent every 12 hours  mirtazapine 7.5 milliGRAM(s) Oral at bedtime    MEDICATIONS  (PRN):  acetaminophen     Tablet .. 650 milliGRAM(s) Oral every 6 hours PRN Temp greater or equal to 38C (100.4F), Mild Pain (1 - 3), Moderate Pain (4 - 6)  aluminum hydroxide/magnesium hydroxide/simethicone Suspension 30 milliLiter(s) Oral every 4 hours PRN Dyspepsia  dextrose Oral Gel 15 Gram(s) Oral once PRN Blood Glucose LESS THAN 70 milliGRAM(s)/deciliter  melatonin 3 milliGRAM(s) Oral at bedtime PRN Insomnia      CAPILLARY BLOOD GLUCOSE      POCT Blood Glucose.: 148 mg/dL (04 Jun 2022 21:27)  POCT Blood Glucose.: 102 mg/dL (04 Jun 2022 17:14)  POCT Blood Glucose.: 273 mg/dL (04 Jun 2022 12:05)  POCT Blood Glucose.: 195 mg/dL (04 Jun 2022 08:29)    I&O's Summary    04 Jun 2022 07:01  -  05 Jun 2022 07:00  --------------------------------------------------------  IN: 600 mL / OUT: 400 mL / NET: 200 mL        PHYSICAL EXAM:  Vital Signs Last 24 Hrs  T(C): 36.9 (05 Jun 2022 05:24), Max: 37.2 (04 Jun 2022 20:48)  T(F): 98.5 (05 Jun 2022 05:24), Max: 98.9 (04 Jun 2022 20:48)  HR: 73 (05 Jun 2022 05:24) (63 - 80)  BP: 150/71 (05 Jun 2022 05:24) (121/85 - 150/71)  BP(mean): --  RR: 18 (05 Jun 2022 05:24) (18 - 18)  SpO2: 96% (05 Jun 2022 05:24) (95% - 96%)    CONSTITUTIONAL: NAD, well-developed  RESPIRATORY: Normal respiratory effort; lungs are clear to auscultation bilaterally  CARDIOVASCULAR: Regular rate and rhythm, normal S1 and S2, no murmur/rub/gallop; No lower extremity edema; Peripheral pulses are 2+ bilaterally  ABDOMEN: Nontender to palpation, normoactive bowel sounds, no rebound/guarding; No hepatosplenomegaly  MUSCULOSKELETAL: no clubbing or cyanosis of digits; no joint swelling or tenderness to palpation  PSYCH: A+O to person, place, and time; affect appropriate    LABS:    06-04    136  |  102  |  9   ----------------------------<  196<H>  4.2   |  26  |  0.68    Ca    8.8      04 Jun 2022 06:55  Phos  2.8     06-04  Mg     2.0     06-04    TPro  5.7<L>  /  Alb  2.4<L>  /  TBili  0.3  /  DBili  x   /  AST  20  /  ALT  10  /  AlkPhos  76  06-04                RADIOLOGY & ADDITIONAL TESTS:  Results Reviewed:   Imaging Personally Reviewed:  Electrocardiogram Personally Reviewed:    COORDINATION OF CARE:  Care Discussed with Consultants/Other Providers [Y/N]: Y  Prior or Outpatient Records Reviewed [Y/N]: Y   PROGRESS NOTE:     CONTACT INFO:  Du Landin MD | PGY-1  Pager: [phone] [place], [id number] [facility]  Also on TEAMS  After 7pm page night float  On weekends after 1pm check resident assignment tab to see who is covering      Patient is a 86y old  Female who presents with a chief complaint of Terminal Ileitis (04 Jun 2022 07:08)      SUBJECTIVE / OVERNIGHT EVENTS:    - No acute events overnight.   - No fevers/chills.  - Patient seen and evaluated at bedside.  - Denies SOB at rest, chest pain, palpitations, abdominal pain, nausea/vomiting    ADDITIONAL REVIEW OF SYSTEMS:    MEDICATIONS  (STANDING):  cefepime   IVPB 1000 milliGRAM(s) IV Intermittent every 12 hours  dextrose 5%. 1000 milliLiter(s) (100 mL/Hr) IV Continuous <Continuous>  dextrose 5%. 1000 milliLiter(s) (50 mL/Hr) IV Continuous <Continuous>  dextrose 50% Injectable 25 Gram(s) IV Push once  dextrose 50% Injectable 12.5 Gram(s) IV Push once  dextrose 50% Injectable 25 Gram(s) IV Push once  enoxaparin Injectable 40 milliGRAM(s) SubCutaneous every 24 hours  glucagon  Injectable 1 milliGRAM(s) IntraMuscular once  hydrocortisone 1% Cream 1 Application(s) Topical two times a day  insulin glargine Injectable (LANTUS) 5 Unit(s) SubCutaneous at bedtime  insulin lispro (ADMELOG) corrective regimen sliding scale   SubCutaneous three times a day before meals  insulin lispro (ADMELOG) corrective regimen sliding scale   SubCutaneous at bedtime  insulin lispro Injectable (ADMELOG) 4 Unit(s) SubCutaneous three times a day before meals  metroNIDAZOLE  IVPB 500 milliGRAM(s) IV Intermittent every 12 hours  mirtazapine 7.5 milliGRAM(s) Oral at bedtime    MEDICATIONS  (PRN):  acetaminophen     Tablet .. 650 milliGRAM(s) Oral every 6 hours PRN Temp greater or equal to 38C (100.4F), Mild Pain (1 - 3), Moderate Pain (4 - 6)  aluminum hydroxide/magnesium hydroxide/simethicone Suspension 30 milliLiter(s) Oral every 4 hours PRN Dyspepsia  dextrose Oral Gel 15 Gram(s) Oral once PRN Blood Glucose LESS THAN 70 milliGRAM(s)/deciliter  melatonin 3 milliGRAM(s) Oral at bedtime PRN Insomnia      CAPILLARY BLOOD GLUCOSE      POCT Blood Glucose.: 148 mg/dL (04 Jun 2022 21:27)  POCT Blood Glucose.: 102 mg/dL (04 Jun 2022 17:14)  POCT Blood Glucose.: 273 mg/dL (04 Jun 2022 12:05)  POCT Blood Glucose.: 195 mg/dL (04 Jun 2022 08:29)    I&O's Summary    04 Jun 2022 07:01  -  05 Jun 2022 07:00  --------------------------------------------------------  IN: 600 mL / OUT: 400 mL / NET: 200 mL        PHYSICAL EXAM:  Vital Signs Last 24 Hrs  T(C): 36.9 (05 Jun 2022 05:24), Max: 37.2 (04 Jun 2022 20:48)  T(F): 98.5 (05 Jun 2022 05:24), Max: 98.9 (04 Jun 2022 20:48)  HR: 73 (05 Jun 2022 05:24) (63 - 80)  BP: 150/71 (05 Jun 2022 05:24) (121/85 - 150/71)  BP(mean): --  RR: 18 (05 Jun 2022 05:24) (18 - 18)  SpO2: 96% (05 Jun 2022 05:24) (95% - 96%)    CONSTITUTIONAL: NAD, age-appropriate  HEENT: no neck tenderness   RESPIRATORY: Normal respiratory effort; lungs are clear to auscultation bilaterally  CARDIOVASCULAR: Regular rate and rhythm, normal S1 and S2, no murmur/rub/gallop; No lower extremity edema;  ABDOMEN: no tenderness in abdomen, normoactive bowel sounds  MUSCULOSKELETAL: no clubbing or cyanosis of digits; no joint swelling or tenderness to palpation  PSYCH: A+O to person, place, and time; affect appropriate    LABS:    06-04    136  |  102  |  9   ----------------------------<  196<H>  4.2   |  26  |  0.68    Ca    8.8      04 Jun 2022 06:55  Phos  2.8     06-04  Mg     2.0     06-04    TPro  5.7<L>  /  Alb  2.4<L>  /  TBili  0.3  /  DBili  x   /  AST  20  /  ALT  10  /  AlkPhos  76  06-04                RADIOLOGY & ADDITIONAL TESTS:  Results Reviewed:   Imaging Personally Reviewed:  Electrocardiogram Personally Reviewed:    COORDINATION OF CARE:  Care Discussed with Consultants/Other Providers [Y/N]: Y  Prior or Outpatient Records Reviewed [Y/N]: Y

## 2022-06-05 NOTE — PROGRESS NOTE ADULT - PROBLEM SELECTOR PLAN 1
i/s/o severe sepsis   - CT angio revealed distal terminal ileitis and c/f small intramural abscess  - surgery consulted; possible surgery depending on results of colonoscopy   - s/p colonoscopy, no indication for further current workup   - CT abdomen scan demonstrate worsening distal ileitis. No intramural abscess. Small loculated ascites.   - advancing diet  - f/u path  - sending vasculitis labs and hepatitis labs, possible carcinoid in the setting of chronic diarrhea and sweats as an outpatient, possible microperforation although still stable abdominal exam i/s/o severe sepsis   - CT angio revealed distal terminal ileitis and c/f small intramural abscess  - surgery consulted; possible surgery depending on results of colonoscopy   - s/p colonoscopy, no indication for further current workup   - CT abdomen scan demonstrate worsening distal ileitis. No intramural abscess. Small loculated ascites.   - advancing diet  - f/u path outpatient   - sending vasculitis labs and hepatitis labs, possible carcinoid in the setting of chronic diarrhea and sweats as an outpatient, possible microperforation although still stable abdominal exam

## 2022-06-05 NOTE — PROGRESS NOTE ADULT - ATTENDING COMMENTS
No events overnight. Had liquids last night, had 1/2 oatmeal for breakfast without pain. Abdominal exam improving.       #Terminal ileitis  -colonoscopy with normal TI despite imaging denoting ileuitis  -unclear etiology. Patient's abodminal pain improving but poor appetite and is not eating with persistent diarhea.   -await pathology of cecum lesion, given sewating, chronic diarrhea and ileitis want to make sure r/o carcinoid. Send 5-HIAA.   -continue Abx for 7 day course, personally discussed with DR. Romero, to end 6/6.   -no mention of foreign body on CT scan.   -patient is tolerating diet, improving abodminal pain and reducing leukocytosis.   -if continues to improve, plan for d/c 6/6.       #Depression  -Patient endorsed depression and passive SI   -Behavioral health consulted, follow up on 6/6 prior to discharge.   -started on remeron qhs     #T2DM  -Hx of T2DM, home medicaitons include prandin and amaryl, had been on metformin but unclear why discontinued   -lss, monitor FS before meals and at bedtime  -carb consistent diet   -c/w lantus 5 U qhs and admelog 4 U TID premeal (basal halved as will be NPO after MN)      Dispo:   PT recommend home w/ home PT.

## 2022-06-05 NOTE — PROGRESS NOTE ADULT - PROBLEM SELECTOR PLAN 5
- Patient verbalized that she "just wants to end it"  - Expresses sadness that friends and family have either  or moved away   - Thought about overdosing on medications   - Psych consulted, appreciate recs   - c/w remeron 7.5mg PO Qhs   - dc jimbo XL - Patient verbalized that she "just wants to end it"  - Expresses sadness that friends and family have either  or moved away   - Thought about overdosing on medications   - Psych consulted, appreciate recs   - c/w remeron 7.5mg PO Qhs   - Psych safety assessment in AM

## 2022-06-06 ENCOUNTER — TRANSCRIPTION ENCOUNTER (OUTPATIENT)
Age: 87
End: 2022-06-06

## 2022-06-06 VITALS — RESPIRATION RATE: 18 BRPM | HEART RATE: 63 BPM | OXYGEN SATURATION: 95 %

## 2022-06-06 LAB
GLUCOSE BLDC GLUCOMTR-MCNC: 133 MG/DL — HIGH (ref 70–99)
GLUCOSE BLDC GLUCOMTR-MCNC: 196 MG/DL — HIGH (ref 70–99)
SURGICAL PATHOLOGY STUDY: SIGNIFICANT CHANGE UP

## 2022-06-06 PROCEDURE — 87046 STOOL CULTR AEROBIC BACT EA: CPT

## 2022-06-06 PROCEDURE — 99231 SBSQ HOSP IP/OBS SF/LOW 25: CPT

## 2022-06-06 PROCEDURE — 85730 THROMBOPLASTIN TIME PARTIAL: CPT

## 2022-06-06 PROCEDURE — 87150 DNA/RNA AMPLIFIED PROBE: CPT

## 2022-06-06 PROCEDURE — 99285 EMERGENCY DEPT VISIT HI MDM: CPT

## 2022-06-06 PROCEDURE — 87086 URINE CULTURE/COLONY COUNT: CPT

## 2022-06-06 PROCEDURE — 82947 ASSAY GLUCOSE BLOOD QUANT: CPT

## 2022-06-06 PROCEDURE — 97110 THERAPEUTIC EXERCISES: CPT

## 2022-06-06 PROCEDURE — 78290 INTESTINE IMAGING: CPT

## 2022-06-06 PROCEDURE — 84443 ASSAY THYROID STIM HORMONE: CPT

## 2022-06-06 PROCEDURE — U0003: CPT

## 2022-06-06 PROCEDURE — 99239 HOSP IP/OBS DSCHRG MGMT >30: CPT | Mod: GC

## 2022-06-06 PROCEDURE — 86038 ANTINUCLEAR ANTIBODIES: CPT

## 2022-06-06 PROCEDURE — 87045 FECES CULTURE AEROBIC BACT: CPT

## 2022-06-06 PROCEDURE — 82962 GLUCOSE BLOOD TEST: CPT

## 2022-06-06 PROCEDURE — 86850 RBC ANTIBODY SCREEN: CPT

## 2022-06-06 PROCEDURE — 87507 IADNA-DNA/RNA PROBE TQ 12-25: CPT

## 2022-06-06 PROCEDURE — 96374 THER/PROPH/DIAG INJ IV PUSH: CPT

## 2022-06-06 PROCEDURE — 87077 CULTURE AEROBIC IDENTIFY: CPT

## 2022-06-06 PROCEDURE — 71045 X-RAY EXAM CHEST 1 VIEW: CPT

## 2022-06-06 PROCEDURE — 93308 TTE F-UP OR LMTD: CPT

## 2022-06-06 PROCEDURE — 83690 ASSAY OF LIPASE: CPT

## 2022-06-06 PROCEDURE — 83605 ASSAY OF LACTIC ACID: CPT

## 2022-06-06 PROCEDURE — 85027 COMPLETE CBC AUTOMATED: CPT

## 2022-06-06 PROCEDURE — 85014 HEMATOCRIT: CPT

## 2022-06-06 PROCEDURE — 81001 URINALYSIS AUTO W/SCOPE: CPT

## 2022-06-06 PROCEDURE — 71275 CT ANGIOGRAPHY CHEST: CPT | Mod: MA

## 2022-06-06 PROCEDURE — 83036 HEMOGLOBIN GLYCOSYLATED A1C: CPT

## 2022-06-06 PROCEDURE — 87641 MR-STAPH DNA AMP PROBE: CPT

## 2022-06-06 PROCEDURE — 84132 ASSAY OF SERUM POTASSIUM: CPT

## 2022-06-06 PROCEDURE — 82435 ASSAY OF BLOOD CHLORIDE: CPT

## 2022-06-06 PROCEDURE — 96375 TX/PRO/DX INJ NEW DRUG ADDON: CPT

## 2022-06-06 PROCEDURE — 74177 CT ABD & PELVIS W/CONTRAST: CPT

## 2022-06-06 PROCEDURE — U0005: CPT

## 2022-06-06 PROCEDURE — 85018 HEMOGLOBIN: CPT

## 2022-06-06 PROCEDURE — 84484 ASSAY OF TROPONIN QUANT: CPT

## 2022-06-06 PROCEDURE — 86036 ANCA SCREEN EACH ANTIBODY: CPT

## 2022-06-06 PROCEDURE — 36415 COLL VENOUS BLD VENIPUNCTURE: CPT

## 2022-06-06 PROCEDURE — 87637 SARSCOV2&INF A&B&RSV AMP PRB: CPT

## 2022-06-06 PROCEDURE — 86905 BLOOD TYPING RBC ANTIGENS: CPT

## 2022-06-06 PROCEDURE — 82330 ASSAY OF CALCIUM: CPT

## 2022-06-06 PROCEDURE — 87177 OVA AND PARASITES SMEARS: CPT

## 2022-06-06 PROCEDURE — 82150 ASSAY OF AMYLASE: CPT

## 2022-06-06 PROCEDURE — 97161 PT EVAL LOW COMPLEX 20 MIN: CPT

## 2022-06-06 PROCEDURE — 80053 COMPREHEN METABOLIC PANEL: CPT

## 2022-06-06 PROCEDURE — 87040 BLOOD CULTURE FOR BACTERIA: CPT

## 2022-06-06 PROCEDURE — 86870 RBC ANTIBODY IDENTIFICATION: CPT

## 2022-06-06 PROCEDURE — 86900 BLOOD TYPING SEROLOGIC ABO: CPT

## 2022-06-06 PROCEDURE — 83735 ASSAY OF MAGNESIUM: CPT

## 2022-06-06 PROCEDURE — 86706 HEP B SURFACE ANTIBODY: CPT

## 2022-06-06 PROCEDURE — 82803 BLOOD GASES ANY COMBINATION: CPT

## 2022-06-06 PROCEDURE — 88305 TISSUE EXAM BY PATHOLOGIST: CPT

## 2022-06-06 PROCEDURE — 86901 BLOOD TYPING SEROLOGIC RH(D): CPT

## 2022-06-06 PROCEDURE — 84295 ASSAY OF SERUM SODIUM: CPT

## 2022-06-06 PROCEDURE — 85025 COMPLETE CBC W/AUTO DIFF WBC: CPT

## 2022-06-06 PROCEDURE — 86880 COOMBS TEST DIRECT: CPT

## 2022-06-06 PROCEDURE — 86704 HEP B CORE ANTIBODY TOTAL: CPT

## 2022-06-06 PROCEDURE — 86922 COMPATIBILITY TEST ANTIGLOB: CPT

## 2022-06-06 PROCEDURE — 80048 BASIC METABOLIC PNL TOTAL CA: CPT

## 2022-06-06 PROCEDURE — 80074 ACUTE HEPATITIS PANEL: CPT

## 2022-06-06 PROCEDURE — 87640 STAPH A DNA AMP PROBE: CPT

## 2022-06-06 PROCEDURE — 76705 ECHO EXAM OF ABDOMEN: CPT

## 2022-06-06 PROCEDURE — A9512: CPT

## 2022-06-06 PROCEDURE — 84100 ASSAY OF PHOSPHORUS: CPT

## 2022-06-06 PROCEDURE — 85610 PROTHROMBIN TIME: CPT

## 2022-06-06 PROCEDURE — 74174 CTA ABD&PLVS W/CONTRAST: CPT | Mod: MA

## 2022-06-06 PROCEDURE — 85652 RBC SED RATE AUTOMATED: CPT

## 2022-06-06 PROCEDURE — 97116 GAIT TRAINING THERAPY: CPT

## 2022-06-06 RX ORDER — METFORMIN HYDROCHLORIDE 850 MG/1
1 TABLET ORAL
Qty: 30 | Refills: 0
Start: 2022-06-06 | End: 2022-07-05

## 2022-06-06 RX ORDER — GLIMEPIRIDE 1 MG
1 TABLET ORAL
Qty: 0 | Refills: 0 | DISCHARGE

## 2022-06-06 RX ORDER — REPAGLINIDE 1 MG/1
1 TABLET ORAL
Qty: 0 | Refills: 0 | DISCHARGE

## 2022-06-06 RX ORDER — MIRTAZAPINE 45 MG/1
1 TABLET, ORALLY DISINTEGRATING ORAL
Qty: 30 | Refills: 0
Start: 2022-06-06 | End: 2022-07-05

## 2022-06-06 RX ADMIN — Medication 100 MILLIGRAM(S): at 05:21

## 2022-06-06 RX ADMIN — Medication 1 APPLICATION(S): at 05:20

## 2022-06-06 RX ADMIN — Medication 4 UNIT(S): at 08:46

## 2022-06-06 RX ADMIN — Medication 4 UNIT(S): at 12:54

## 2022-06-06 RX ADMIN — CEFEPIME 100 MILLIGRAM(S): 1 INJECTION, POWDER, FOR SOLUTION INTRAMUSCULAR; INTRAVENOUS at 15:19

## 2022-06-06 RX ADMIN — Medication 100 MILLIGRAM(S): at 15:52

## 2022-06-06 RX ADMIN — Medication 1: at 12:53

## 2022-06-06 RX ADMIN — CEFEPIME 100 MILLIGRAM(S): 1 INJECTION, POWDER, FOR SOLUTION INTRAMUSCULAR; INTRAVENOUS at 05:21

## 2022-06-06 NOTE — PROGRESS NOTE ADULT - PROBLEM SELECTOR PLAN 2
Admitted with severe sepsis (Tmax 101.2, leukocytosis, tachycardic to 116-123, 15.3% bands, lactate to 3.7); s/p total 1.5 L   - CT angio revealed distal terminal ileitis and concern for small intramural abscess   - CXR clear, UA negative, pending BCX  - GI PCR (-)tive  - f/u C. diff   - c/w cefepime and flagyl   - ID following (Benson Mcgraw), to terminate Abx upon discharge   - BCx 5/23 grew 1 out of 2 bottles CoNS; repeat BCx 4/24 NGTD, likely contaminant, will not treat Admitted with severe sepsis (Tmax 101.2, leukocytosis, tachycardic to 116-123, 15.3% bands, lactate to 3.7); s/p total 1.5 L   - CT angio revealed distal terminal ileitis and concern for small intramural abscess   - CXR clear, UA negative, pending BCX  - GI PCR (-)tive  - f/u C. diff   - c/w cefepime and flagyl, last dose 3PM   - ID following (Benson Mcgraw), to terminate Abx upon discharge   - BCx 5/23 grew 1 out of 2 bottles CoNS; repeat BCx 4/24 NGTD, likely contaminant, will not treat

## 2022-06-06 NOTE — PROGRESS NOTE ADULT - ATTENDING SUPERVISION STATEMENT
Fellow
Resident
Fellow
Resident
Resident
Fellow
Fellow
Resident

## 2022-06-06 NOTE — PROGRESS NOTE ADULT - SUBJECTIVE AND OBJECTIVE BOX
PROGRESS NOTE:     CONTACT INFO:  Du Landin MD | PGY-1  Pager: [phone] [place], [address] [facility]  Also on TEAMS  After 7pm page night float  On weekends after 1pm check resident assignment tab to see who is covering      Patient is a 86y old  Female who presents with a chief complaint of Terminal Ileitis (05 Jun 2022 07:05)      SUBJECTIVE / OVERNIGHT EVENTS:    - No acute events overnight.   - No fevers/chills.  - Patient seen and evaluated at bedside.  - Denies SOB at rest, chest pain, palpitations, abdominal pain, nausea/vomiting    ADDITIONAL REVIEW OF SYSTEMS:    MEDICATIONS  (STANDING):  cefepime   IVPB 1000 milliGRAM(s) IV Intermittent every 12 hours  dextrose 5%. 1000 milliLiter(s) (100 mL/Hr) IV Continuous <Continuous>  dextrose 5%. 1000 milliLiter(s) (50 mL/Hr) IV Continuous <Continuous>  dextrose 50% Injectable 25 Gram(s) IV Push once  dextrose 50% Injectable 12.5 Gram(s) IV Push once  dextrose 50% Injectable 25 Gram(s) IV Push once  enoxaparin Injectable 40 milliGRAM(s) SubCutaneous every 24 hours  glucagon  Injectable 1 milliGRAM(s) IntraMuscular once  hydrocortisone 1% Cream 1 Application(s) Topical two times a day  insulin glargine Injectable (LANTUS) 5 Unit(s) SubCutaneous at bedtime  insulin lispro (ADMELOG) corrective regimen sliding scale   SubCutaneous three times a day before meals  insulin lispro (ADMELOG) corrective regimen sliding scale   SubCutaneous at bedtime  insulin lispro Injectable (ADMELOG) 4 Unit(s) SubCutaneous three times a day before meals  metroNIDAZOLE  IVPB 500 milliGRAM(s) IV Intermittent every 12 hours  mirtazapine 7.5 milliGRAM(s) Oral at bedtime    MEDICATIONS  (PRN):  acetaminophen     Tablet .. 650 milliGRAM(s) Oral every 6 hours PRN Temp greater or equal to 38C (100.4F), Mild Pain (1 - 3), Moderate Pain (4 - 6)  aluminum hydroxide/magnesium hydroxide/simethicone Suspension 30 milliLiter(s) Oral every 4 hours PRN Dyspepsia  dextrose Oral Gel 15 Gram(s) Oral once PRN Blood Glucose LESS THAN 70 milliGRAM(s)/deciliter  melatonin 3 milliGRAM(s) Oral at bedtime PRN Insomnia      CAPILLARY BLOOD GLUCOSE      POCT Blood Glucose.: 136 mg/dL (05 Jun 2022 22:37)  POCT Blood Glucose.: 88 mg/dL (05 Jun 2022 21:40)  POCT Blood Glucose.: 118 mg/dL (05 Jun 2022 17:55)  POCT Blood Glucose.: 230 mg/dL (05 Jun 2022 13:43)  POCT Blood Glucose.: 151 mg/dL (05 Jun 2022 12:07)  POCT Blood Glucose.: 123 mg/dL (05 Jun 2022 08:23)    I&O's Summary    05 Jun 2022 07:01  -  06 Jun 2022 07:00  --------------------------------------------------------  IN: 510 mL / OUT: 0 mL / NET: 510 mL        PHYSICAL EXAM:  Vital Signs Last 24 Hrs  T(C): 36.4 (06 Jun 2022 04:59), Max: 37.6 (05 Jun 2022 20:20)  T(F): 97.5 (06 Jun 2022 04:59), Max: 99.6 (05 Jun 2022 20:20)  HR: 63 (06 Jun 2022 04:59) (63 - 109)  BP: 137/56 (06 Jun 2022 04:59) (107/57 - 137/56)  BP(mean): --  RR: 18 (06 Jun 2022 04:59) (18 - 18)  SpO2: 94% (06 Jun 2022 04:59) (94% - 95%)    CONSTITUTIONAL: NAD, well-developed  RESPIRATORY: Normal respiratory effort; lungs are clear to auscultation bilaterally  CARDIOVASCULAR: Regular rate and rhythm, normal S1 and S2, no murmur/rub/gallop; No lower extremity edema; Peripheral pulses are 2+ bilaterally  ABDOMEN: Nontender to palpation, normoactive bowel sounds, no rebound/guarding; No hepatosplenomegaly  MUSCULOSKELETAL: no clubbing or cyanosis of digits; no joint swelling or tenderness to palpation  PSYCH: A+O to person, place, and time; affect appropriate    LABS:                        11.4   10.41 )-----------( 615      ( 05 Jun 2022 07:43 )             35.1     06-05    140  |  104  |  9   ----------------------------<  126<H>  4.1   |  27  |  0.72    Ca    8.8      05 Jun 2022 07:34  Phos  3.1     06-05  Mg     1.9     06-05    TPro  5.7<L>  /  Alb  2.4<L>  /  TBili  0.2  /  DBili  x   /  AST  36  /  ALT  16  /  AlkPhos  88  06-05                RADIOLOGY & ADDITIONAL TESTS:  Results Reviewed:   Imaging Personally Reviewed:  Electrocardiogram Personally Reviewed:    COORDINATION OF CARE:  Care Discussed with Consultants/Other Providers [Y/N]: Y  Prior or Outpatient Records Reviewed [Y/N]: ZAIRE   PROGRESS NOTE:     CONTACT INFO:  Du Landin MD | PGY-1  Pager: [phone] [place], [id number] [facility]  Also on TEAMS  After 7pm page night float  On weekends after 1pm check resident assignment tab to see who is covering      Patient is a 86y old  Female who presents with a chief complaint of Terminal Ileitis (05 Jun 2022 07:05)      SUBJECTIVE / OVERNIGHT EVENTS:    - No acute events overnight.   - No fevers/chills.  - Patient seen and evaluated at bedside.  - Denies SOB at rest, chest pain, palpitations, abdominal pain, nausea/vomiting    ADDITIONAL REVIEW OF SYSTEMS:    MEDICATIONS  (STANDING):  cefepime   IVPB 1000 milliGRAM(s) IV Intermittent every 12 hours  dextrose 5%. 1000 milliLiter(s) (100 mL/Hr) IV Continuous <Continuous>  dextrose 5%. 1000 milliLiter(s) (50 mL/Hr) IV Continuous <Continuous>  dextrose 50% Injectable 25 Gram(s) IV Push once  dextrose 50% Injectable 12.5 Gram(s) IV Push once  dextrose 50% Injectable 25 Gram(s) IV Push once  enoxaparin Injectable 40 milliGRAM(s) SubCutaneous every 24 hours  glucagon  Injectable 1 milliGRAM(s) IntraMuscular once  hydrocortisone 1% Cream 1 Application(s) Topical two times a day  insulin glargine Injectable (LANTUS) 5 Unit(s) SubCutaneous at bedtime  insulin lispro (ADMELOG) corrective regimen sliding scale   SubCutaneous three times a day before meals  insulin lispro (ADMELOG) corrective regimen sliding scale   SubCutaneous at bedtime  insulin lispro Injectable (ADMELOG) 4 Unit(s) SubCutaneous three times a day before meals  metroNIDAZOLE  IVPB 500 milliGRAM(s) IV Intermittent every 12 hours  mirtazapine 7.5 milliGRAM(s) Oral at bedtime    MEDICATIONS  (PRN):  acetaminophen     Tablet .. 650 milliGRAM(s) Oral every 6 hours PRN Temp greater or equal to 38C (100.4F), Mild Pain (1 - 3), Moderate Pain (4 - 6)  aluminum hydroxide/magnesium hydroxide/simethicone Suspension 30 milliLiter(s) Oral every 4 hours PRN Dyspepsia  dextrose Oral Gel 15 Gram(s) Oral once PRN Blood Glucose LESS THAN 70 milliGRAM(s)/deciliter  melatonin 3 milliGRAM(s) Oral at bedtime PRN Insomnia      CAPILLARY BLOOD GLUCOSE      POCT Blood Glucose.: 136 mg/dL (05 Jun 2022 22:37)  POCT Blood Glucose.: 88 mg/dL (05 Jun 2022 21:40)  POCT Blood Glucose.: 118 mg/dL (05 Jun 2022 17:55)  POCT Blood Glucose.: 230 mg/dL (05 Jun 2022 13:43)  POCT Blood Glucose.: 151 mg/dL (05 Jun 2022 12:07)  POCT Blood Glucose.: 123 mg/dL (05 Jun 2022 08:23)    I&O's Summary    05 Jun 2022 07:01  -  06 Jun 2022 07:00  --------------------------------------------------------  IN: 510 mL / OUT: 0 mL / NET: 510 mL        PHYSICAL EXAM:  Vital Signs Last 24 Hrs  T(C): 36.4 (06 Jun 2022 04:59), Max: 37.6 (05 Jun 2022 20:20)  T(F): 97.5 (06 Jun 2022 04:59), Max: 99.6 (05 Jun 2022 20:20)  HR: 63 (06 Jun 2022 04:59) (63 - 109)  BP: 137/56 (06 Jun 2022 04:59) (107/57 - 137/56)  BP(mean): --  RR: 18 (06 Jun 2022 04:59) (18 - 18)  SpO2: 94% (06 Jun 2022 04:59) (94% - 95%)      CONSTITUTIONAL: NAD, age-appropriate  HEENT: no neck tenderness   RESPIRATORY: Normal respiratory effort; lungs are clear to auscultation bilaterally  CARDIOVASCULAR: Regular rate and rhythm, normal S1 and S2, no murmur/rub/gallop; No lower extremity edema;  ABDOMEN: no tenderness in abdomen, normoactive bowel sounds  MUSCULOSKELETAL: no clubbing or cyanosis of digits; no joint swelling or tenderness to palpation  PSYCH: A+O to person, place, and time; affect appropriate    LABS:                        11.4   10.41 )-----------( 615      ( 05 Jun 2022 07:43 )             35.1     06-05    140  |  104  |  9   ----------------------------<  126<H>  4.1   |  27  |  0.72    Ca    8.8      05 Jun 2022 07:34  Phos  3.1     06-05  Mg     1.9     06-05    TPro  5.7<L>  /  Alb  2.4<L>  /  TBili  0.2  /  DBili  x   /  AST  36  /  ALT  16  /  AlkPhos  88  06-05                RADIOLOGY & ADDITIONAL TESTS:  Results Reviewed:   Imaging Personally Reviewed:  Electrocardiogram Personally Reviewed:    COORDINATION OF CARE:  Care Discussed with Consultants/Other Providers [Y/N]: Y  Prior or Outpatient Records Reviewed [Y/N]: Y   PROGRESS NOTE:     CONTACT INFO:  Du Landin MD | PGY-1  Pager: [phone] [place], [address] [facility]  Also on TEAMS  After 7pm page night float  On weekends after 1pm check resident assignment tab to see who is covering      Patient is a 86y old  Female who presents with a chief complaint of Terminal Ileitis (05 Jun 2022 07:05)      SUBJECTIVE / OVERNIGHT EVENTS:    - No acute events overnight.   - No fevers/chills.  - Patient seen and evaluated at bedside.  - Denies SOB at rest, chest pain, palpitations, abdominal pain, nausea/vomiting    ADDITIONAL REVIEW OF SYSTEMS:    MEDICATIONS  (STANDING):  cefepime   IVPB 1000 milliGRAM(s) IV Intermittent every 12 hours  dextrose 5%. 1000 milliLiter(s) (100 mL/Hr) IV Continuous <Continuous>  dextrose 5%. 1000 milliLiter(s) (50 mL/Hr) IV Continuous <Continuous>  dextrose 50% Injectable 25 Gram(s) IV Push once  dextrose 50% Injectable 12.5 Gram(s) IV Push once  dextrose 50% Injectable 25 Gram(s) IV Push once  enoxaparin Injectable 40 milliGRAM(s) SubCutaneous every 24 hours  glucagon  Injectable 1 milliGRAM(s) IntraMuscular once  hydrocortisone 1% Cream 1 Application(s) Topical two times a day  insulin glargine Injectable (LANTUS) 5 Unit(s) SubCutaneous at bedtime  insulin lispro (ADMELOG) corrective regimen sliding scale   SubCutaneous three times a day before meals  insulin lispro (ADMELOG) corrective regimen sliding scale   SubCutaneous at bedtime  insulin lispro Injectable (ADMELOG) 4 Unit(s) SubCutaneous three times a day before meals  metroNIDAZOLE  IVPB 500 milliGRAM(s) IV Intermittent every 12 hours  mirtazapine 7.5 milliGRAM(s) Oral at bedtime    MEDICATIONS  (PRN):  acetaminophen     Tablet .. 650 milliGRAM(s) Oral every 6 hours PRN Temp greater or equal to 38C (100.4F), Mild Pain (1 - 3), Moderate Pain (4 - 6)  aluminum hydroxide/magnesium hydroxide/simethicone Suspension 30 milliLiter(s) Oral every 4 hours PRN Dyspepsia  dextrose Oral Gel 15 Gram(s) Oral once PRN Blood Glucose LESS THAN 70 milliGRAM(s)/deciliter  melatonin 3 milliGRAM(s) Oral at bedtime PRN Insomnia      CAPILLARY BLOOD GLUCOSE      POCT Blood Glucose.: 136 mg/dL (05 Jun 2022 22:37)  POCT Blood Glucose.: 88 mg/dL (05 Jun 2022 21:40)  POCT Blood Glucose.: 118 mg/dL (05 Jun 2022 17:55)  POCT Blood Glucose.: 230 mg/dL (05 Jun 2022 13:43)  POCT Blood Glucose.: 151 mg/dL (05 Jun 2022 12:07)  POCT Blood Glucose.: 123 mg/dL (05 Jun 2022 08:23)    I&O's Summary    05 Jun 2022 07:01  -  06 Jun 2022 07:00  --------------------------------------------------------  IN: 510 mL / OUT: 0 mL / NET: 510 mL        PHYSICAL EXAM:  Vital Signs Last 24 Hrs  T(C): 36.4 (06 Jun 2022 04:59), Max: 37.6 (05 Jun 2022 20:20)  T(F): 97.5 (06 Jun 2022 04:59), Max: 99.6 (05 Jun 2022 20:20)  HR: 63 (06 Jun 2022 04:59) (63 - 109)  BP: 137/56 (06 Jun 2022 04:59) (107/57 - 137/56)  BP(mean): --  RR: 18 (06 Jun 2022 04:59) (18 - 18)  SpO2: 94% (06 Jun 2022 04:59) (94% - 95%)        CONSTITUTIONAL: NAD, age-appropriate  HEENT: no neck tenderness   RESPIRATORY: Normal respiratory effort; lungs are clear to auscultation bilaterally  CARDIOVASCULAR: Regular rate and rhythm, normal S1 and S2, no murmur/rub/gallop; No lower extremity edema;  ABDOMEN: no tenderness in abdomen, normoactive bowel sounds  MUSCULOSKELETAL: no clubbing or cyanosis of digits; no joint swelling or tenderness to palpation  PSYCH: A+O to person, place, and time; affect appropriate      LABS:                        11.4   10.41 )-----------( 615      ( 05 Jun 2022 07:43 )             35.1     06-05    140  |  104  |  9   ----------------------------<  126<H>  4.1   |  27  |  0.72    Ca    8.8      05 Jun 2022 07:34  Phos  3.1     06-05  Mg     1.9     06-05    TPro  5.7<L>  /  Alb  2.4<L>  /  TBili  0.2  /  DBili  x   /  AST  36  /  ALT  16  /  AlkPhos  88  06-05                RADIOLOGY & ADDITIONAL TESTS:  Results Reviewed:   Imaging Personally Reviewed:  Electrocardiogram Personally Reviewed:    COORDINATION OF CARE:  Care Discussed with Consultants/Other Providers [Y/N]: Y  Prior or Outpatient Records Reviewed [Y/N]: Y

## 2022-06-06 NOTE — PROGRESS NOTE ADULT - REASON FOR ADMISSION
Terminal Ileitis

## 2022-06-06 NOTE — PROGRESS NOTE ADULT - PROBLEM SELECTOR PLAN 5
- Patient verbalized that she "just wants to end it"  - Expresses sadness that friends and family have either  or moved away   - Thought about overdosing on medications   - Psych consulted, appreciate recs   - c/w remeron 7.5mg PO Qhs   - Psych safety assessment in AM - Patient verbalized that she "just wants to end it"  - Expresses sadness that friends and family have either  or moved away   - Thought about overdosing on medications   - Psych consulted, appreciate recs   - c/w remeron 7.5mg PO Qhs, ok to continue after discharge    - Psych safety assessment completed

## 2022-06-06 NOTE — PROGRESS NOTE ADULT - ASSESSMENT
86F w/ PMH T2DM who presents w/ abdominal and shoulder pain, found to have severe sepsis int he setting of terminal ileitis and possible intramural abscess as seen on CTA. Meckel scan negative. S/p CT scan, to consult surgery as to further management. Now status post colonoscopy, pending pathology 86F w/ PMH T2DM who presents w/ abdominal and shoulder pain, found to have severe sepsis int he setting of terminal ileitis and possible intramural abscess as seen on CTA. Meckel scan negative. S/p CT scan, to consult surgery as to further management. Now status post colonoscopy, pending discharge.

## 2022-06-06 NOTE — DISCHARGE NOTE NURSING/CASE MANAGEMENT/SOCIAL WORK - NSDCPEFALRISK_GEN_ALL_CORE
For information on Fall & Injury Prevention, visit: [email or web address] OR  [email or web address] OR  https://www.cdc.gov/steadi/patient.html

## 2022-06-06 NOTE — DISCHARGE NOTE NURSING/CASE MANAGEMENT/SOCIAL WORK - PATIENT PORTAL LINK FT
You can access the FollowMyHealth Patient Portal offered by [facility] by registering at the following website: [email or web address]. By joining [employer]’s FollowMyHealth portal, you will also be able to view your health information using other applications (apps) compatible with our system.

## 2022-06-06 NOTE — PROGRESS NOTE ADULT - NUTRITIONAL ASSESSMENT
This patient has been assessed with a concern for Malnutrition and has been determined to have a diagnosis/diagnoses of Moderate protein-calorie malnutrition.    This patient is being managed with:   Diet NPO after Midnight-     NPO Start Date: 01-Jun-2022   NPO Start Time: 23:59  Except Medications  Entered: Jun 1 2022  7:48AM    Diet Clear Liquid-  Entered: May 31 2022 10:53AM    
This patient has been assessed with a concern for Malnutrition and has been determined to have a diagnosis/diagnoses of Moderate protein-calorie malnutrition.    This patient is being managed with:   Diet NPO after Midnight-     NPO Start Date: 01-Jun-2022   NPO Start Time: 23:59  Except Medications  Entered: Jun 1 2022  7:48AM    Diet Clear Liquid-  Entered: May 31 2022 10:53AM    
This patient has been assessed with a concern for Malnutrition and has been determined to have a diagnosis/diagnoses of Moderate protein-calorie malnutrition.    This patient is being managed with:   Diet Clear Liquid-  Entered: May 31 2022 10:53AM    
This patient has been assessed with a concern for Malnutrition and has been determined to have a diagnosis/diagnoses of Moderate protein-calorie malnutrition.    This patient is being managed with:   Diet Consistent Carbohydrate Renal/No Snacks-  Entered: [place]  3 2022  1:44PM    

## 2022-06-06 NOTE — PROGRESS NOTE ADULT - PROVIDER SPECIALTY LIST ADULT
Surgery
Surgery
Gastroenterology
Gastroenterology
Infectious Disease
Surgery
Gastroenterology
Internal Medicine
Surgery
Surgery
Internal Medicine

## 2022-06-06 NOTE — PROGRESS NOTE ADULT - PROBLEM SELECTOR PLAN 1
i/s/o severe sepsis   - CT angio revealed distal terminal ileitis and c/f small intramural abscess  - surgery consulted; possible surgery depending on results of colonoscopy   - s/p colonoscopy, no indication for further current workup   - CT abdomen scan demonstrate worsening distal ileitis. No intramural abscess. Small loculated ascites.   - advancing diet  - f/u path outpatient   - sending vasculitis labs and hepatitis labs, possible carcinoid in the setting of chronic diarrhea and sweats as an outpatient, possible microperforation although still stable abdominal exam

## 2022-06-06 NOTE — DISCHARGE NOTE NURSING/CASE MANAGEMENT/SOCIAL WORK - NSDCFUADDAPPT_GEN_ALL_CORE_FT
Geriatric psychiatry  [phone]  [address] [place] [address]

## 2022-06-07 LAB
ANA TITR SER: NEGATIVE — SIGNIFICANT CHANGE UP
AUTO DIFF PNL BLD: NEGATIVE — SIGNIFICANT CHANGE UP
C-ANCA SER-ACNC: NEGATIVE — SIGNIFICANT CHANGE UP
MPO AB + PR3 PNL SER: SIGNIFICANT CHANGE UP
P-ANCA SER-ACNC: NEGATIVE — SIGNIFICANT CHANGE UP

## 2022-06-14 ENCOUNTER — NON-APPOINTMENT (OUTPATIENT)
Age: 87
End: 2022-06-14

## 2022-06-17 ENCOUNTER — APPOINTMENT (OUTPATIENT)
Dept: INTERNAL MEDICINE | Facility: CLINIC | Age: 87
End: 2022-06-17

## 2022-06-24 ENCOUNTER — APPOINTMENT (OUTPATIENT)
Dept: INTERNAL MEDICINE | Facility: CLINIC | Age: 87
End: 2022-06-24
Payer: MEDICARE

## 2022-06-24 VITALS
DIASTOLIC BLOOD PRESSURE: 64 MMHG | HEART RATE: 95 BPM | OXYGEN SATURATION: 96 % | SYSTOLIC BLOOD PRESSURE: 141 MMHG | HEIGHT: 62 IN | BODY MASS INDEX: 35.15 KG/M2 | RESPIRATION RATE: 12 BRPM | WEIGHT: 191 LBS | TEMPERATURE: 97.1 F

## 2022-06-24 PROCEDURE — 99214 OFFICE O/P EST MOD 30 MIN: CPT

## 2022-06-24 RX ORDER — REPAGLINIDE 1 MG/1
1 TABLET ORAL
Refills: 0 | Status: DISCONTINUED | COMMUNITY
End: 2022-06-24

## 2022-06-24 RX ORDER — GLIMEPIRIDE 4 MG/1
4 TABLET ORAL
Refills: 0 | Status: DISCONTINUED | COMMUNITY
End: 2022-06-24

## 2022-06-26 NOTE — PHYSICAL EXAM
[No Acute Distress] : no acute distress [Well Nourished] : well nourished [Normal Sclera/Conjunctiva] : normal sclera/conjunctiva [EOMI] : extraocular movements intact [Normal Outer Ear/Nose] : the outer ears and nose were normal in appearance [Normal Oropharynx] : the oropharynx was normal [Normal TMs] : both tympanic membranes were normal [No JVD] : no jugular venous distention [No Lymphadenopathy] : no lymphadenopathy [Supple] : supple [Thyroid Normal, No Nodules] : the thyroid was normal and there were no nodules present [No Respiratory Distress] : no respiratory distress  [No Accessory Muscle Use] : no accessory muscle use [Clear to Auscultation] : lungs were clear to auscultation bilaterally [Normal Rate] : normal rate  [Regular Rhythm] : with a regular rhythm [Normal S1, S2] : normal S1 and S2 [No Murmur] : no murmur heard [Pedal Pulses Present] : the pedal pulses are present [No Edema] : there was no peripheral edema [Soft] : abdomen soft [Non Tender] : non-tender [Non-distended] : non-distended [Normal Bowel Sounds] : normal bowel sounds [Normal Posterior Cervical Nodes] : no posterior cervical lymphadenopathy [Normal Anterior Cervical Nodes] : no anterior cervical lymphadenopathy [No CVA Tenderness] : no CVA  tenderness [No Spinal Tenderness] : no spinal tenderness [No Joint Swelling] : no joint swelling [Grossly Normal Strength/Tone] : grossly normal strength/tone [No Rash] : no rash [No Focal Deficits] : no focal deficits [Normal Affect] : the affect was normal [Normal Insight/Judgement] : insight and judgment were intact [de-identified] : Ambulates with walker

## 2022-06-26 NOTE — HISTORY OF PRESENT ILLNESS
[de-identified] : 86 years old female who is accompanied by her daughter with history of diabetes/hypercholesterolemia presents for follow-up after recent hospitalization for sepsis/terminal ileitis.  Patient had CT abdomen and pelvis in the  hospital and colonoscopy which resulted in polypectomy, pathology positive for  tubular adenoma.  Patient was followed by GI and was recommended to repeat colonoscopy in 5 to 7 years.  As per patient daughter patient is functional declining after utilization.  She ambulates with walker.  She lives alone and daughter looking for the private aide.  Patient denies chest pain, shortness of breath, dizziness, abdominal pain.

## 2022-06-26 NOTE — PLAN
[FreeTextEntry1] : 1.  Diabetes\par Patient was started on metformin in the hospital\par Will monitor hemoglobin A1c\par Home glucose monitoring advised\par 2.  Depression/insomnia\par Continue mirtazapine\par Psych follow-up\par 3.  Hypertension\par Off meds\par Will monitor home BP

## 2022-06-27 LAB
25(OH)D3 SERPL-MCNC: 47 NG/ML
ALBUMIN SERPL ELPH-MCNC: 3.6 G/DL
ALP BLD-CCNC: 72 U/L
ALT SERPL-CCNC: 13 U/L
ANION GAP SERPL CALC-SCNC: 13 MMOL/L
AST SERPL-CCNC: 21 U/L
BASOPHILS # BLD AUTO: 0.06 K/UL
BASOPHILS NFR BLD AUTO: 1 %
BILIRUB SERPL-MCNC: 0.2 MG/DL
BUN SERPL-MCNC: 12 MG/DL
CALCIUM SERPL-MCNC: 9.7 MG/DL
CHLORIDE SERPL-SCNC: 97 MMOL/L
CHOLEST SERPL-MCNC: 200 MG/DL
CO2 SERPL-SCNC: 27 MMOL/L
COVID-19 SPIKE DOMAIN ANTIBODY INTERPRETATION: POSITIVE
CREAT SERPL-MCNC: 0.74 MG/DL
EGFR: 79 ML/MIN/1.73M2
EOSINOPHIL # BLD AUTO: 0.14 K/UL
EOSINOPHIL NFR BLD AUTO: 2.3 %
ESTIMATED AVERAGE GLUCOSE: 237 MG/DL
GLUCOSE SERPL-MCNC: 410 MG/DL
HBA1C MFR BLD HPLC: 9.9 %
HCT VFR BLD CALC: 41.3 %
HDLC SERPL-MCNC: 52 MG/DL
HGB BLD-MCNC: 13.1 G/DL
IMM GRANULOCYTES NFR BLD AUTO: 0.7 %
LDLC SERPL CALC-MCNC: 121 MG/DL
LYMPHOCYTES # BLD AUTO: 1.26 K/UL
LYMPHOCYTES NFR BLD AUTO: 20.7 %
MAN DIFF?: NORMAL
MCHC RBC-ENTMCNC: 30.8 PG
MCHC RBC-ENTMCNC: 31.7 GM/DL
MCV RBC AUTO: 96.9 FL
MONOCYTES # BLD AUTO: 0.51 K/UL
MONOCYTES NFR BLD AUTO: 8.4 %
NEUTROPHILS # BLD AUTO: 4.09 K/UL
NEUTROPHILS NFR BLD AUTO: 66.9 %
NONHDLC SERPL-MCNC: 148 MG/DL
PLATELET # BLD AUTO: 374 K/UL
POTASSIUM SERPL-SCNC: 4.8 MMOL/L
PROT SERPL-MCNC: 6.9 G/DL
RBC # BLD: 4.26 M/UL
RBC # FLD: 13.1 %
SARS-COV-2 AB SERPL IA-ACNC: >250 U/ML
SODIUM SERPL-SCNC: 137 MMOL/L
TRIGL SERPL-MCNC: 139 MG/DL
TSH SERPL-ACNC: 1 UIU/ML
VIT B12 SERPL-MCNC: 593 PG/ML
WBC # FLD AUTO: 6.1 K/UL

## 2022-07-12 ENCOUNTER — APPOINTMENT (OUTPATIENT)
Dept: GASTROENTEROLOGY | Facility: CLINIC | Age: 87
End: 2022-07-12

## 2022-07-12 ENCOUNTER — NON-APPOINTMENT (OUTPATIENT)
Age: 87
End: 2022-07-12

## 2022-07-12 VITALS
BODY MASS INDEX: 34.78 KG/M2 | HEIGHT: 62 IN | DIASTOLIC BLOOD PRESSURE: 74 MMHG | SYSTOLIC BLOOD PRESSURE: 130 MMHG | HEART RATE: 98 BPM | WEIGHT: 189 LBS | OXYGEN SATURATION: 98 %

## 2022-07-12 PROCEDURE — 99214 OFFICE O/P EST MOD 30 MIN: CPT

## 2022-07-12 PROCEDURE — 99204 OFFICE O/P NEW MOD 45 MIN: CPT

## 2022-07-12 RX ORDER — MIRTAZAPINE 7.5 MG/1
7.5 TABLET, FILM COATED ORAL
Qty: 30 | Refills: 0 | Status: ACTIVE | COMMUNITY
Start: 2022-06-06

## 2022-07-12 NOTE — ASSESSMENT
[FreeTextEntry1] : 86F w HTN, DM, here after admission to [facility] in May 2022. \par \par # Diarrhea \par # Ileitis \par Long standing diarrhea (~5yrs), 3-4watery nonbloody bms per day. \par Admitted to [facility] w abdominal pain + diarrhea, found to have severe sepsis 2/2 ?terminal ileitis and intramural abscess. \par \par CT angio abdomen/pelvis 5/23 --> wall thickening in the distal through terminal ileum, with structure concerning for abscess \par CT abd/pevis 5/28 -> worsenign distal ileitis, no abscess, loculated ascites \par s/p colonoscopy showing normal terminal ileum, localized ?abnl mucosa in ceum, +diverticulosis, polyp in descending colon. Path = normal TI and cecum biopsies, TA in descending colon. `\par \par - responded to course of abx while inpatient, now w/ recurrence of diarrhea over the last 2-3 wks. \par - check c diff, gi pcr, stool culture \par - check fecal calprotectin\par - CT Enterography to evaluate small bowel \par - dairy free diet \par - drinks 2 pitchers of iced team (using chinese herbal tea tea bags). Advised to stop completely. Maintain hydration w/ water, sugar free gatorade or other drink, etc. \par \par RTC after stool tests and imaging

## 2022-07-12 NOTE — HISTORY OF PRESENT ILLNESS
[de-identified] : 86F w HTN, DM, here after admission to [facility] in May 2022. \par \par The patient was admitted to [facility] w abdominal pain, found to have severe sepsis 2/2 ?terminal ileitis and intramural abscess. \par \par CT angio abdomen/pelvis 5/23 --> wall thickening in the distal through terminal ileum, with structure concerning for abscess \par CT abd/pevis 5/28 -> worsenign distal ileitis, no abscess, loculated ascites \par \par \par seen by GI while inpatient, s/p colonoscopy showing normal terminal ileum, localized ?abnl mucosa in ceum, +diverticulosis, polyp in descending colon. Path = normal TI and cecum biopsies, TA in descending colon. `\par \par \par She states she had diarrhea x 5 yrs, presented to [facility] in May due to abdominal pain. Ileitis thought to be ?infectious vs inflammatory and patient reports improvement of both diarrhea and abdominal pain during hospital stay and following discharge. Over the last two weeks has had recurrence of diarrhea. REports 3-4 watery brown bowel movements per day. No blood in stool. Deneis abd pain, n/v, fevers, chills. Has changed her DM2 meds. No further courses of abx.

## 2023-04-28 NOTE — PROGRESS NOTE ADULT - ATTENDING COMMENTS
97.3 Patient is an 86 y.o F w/ PMH T2DM who presents w/ abdominal pain, found to have severe sepsis int he setting of terminal ileitis, course c/b worsening RUQ pain, found to have worsening terminal ileitis on repeat CT abd/pelvis. Pending colonoscopy today    #Terminal ileitis  -Initially admitted w/ severe sepsis likely 2/2 terminal ileitis as seen on imaging; UA and BCX NGTD  -CT angio revealed distal terminal ileitis and c/f small intramural abscess; course c/b worsening RUQ pain and so repeat CT abd/pelvis performed-demonstrated worsening terminal ileitis, no intramural abscess noted   -Stool culture negative; however, obtained after several days of abx   -unclear if infections vs. inflammatory vs. ischemic, f/u colonoscopy findings today   -surgery consulted; following  -ID following; appreciate recs  -s/p zosyn [5/24-6/1]; as persistent pain w/ zosyn, switched to cefepime/flagyl [6/2-    #RUQ pain   -Patient continues to endorse RUQ pain, unclear etiology   -RUQ U/S revealed gall bladder sludge, some edema in gall bladder well in the setting of ascites   -Meckel scan (5/27) negative for ectopic tissue  -Repeat CT abd/pelvis w/ worsening terminal ileitis  -see plan as above     #Depression  -Patient endorsed depression and passive SI   -Behavioral health consulted  -started on remeron qhs     #T2DM  -Hx of T2DM, home medicaitons include prandin and amaryl, had been on metformin but unclear why discontinued   -lss, monitor FS before meals and at bedtime  -carb consistent diet   -c/w lantus 5 U qhs and admelog 4 U TID premeal (basal halved as will be NPO after MN)  -A1c 9.3%, will likely need to start insulin therapy and f/u outpatient endocrine     Dispo: pending colonoscopy  PT recommend home w/ home PT.

## 2023-05-11 NOTE — PROGRESS NOTE ADULT - SUBJECTIVE AND OBJECTIVE BOX
Follow Up:  terminal ileitis    Interval History/ROS:  marked weakness, anorexia  mild lower abd pain, liquid stools    Allergies  No Known Allergies    ANTIMICROBIALS:  cefepime   IVPB 1000 every 12 hours  metroNIDAZOLE  IVPB 500 every 12 hours      OTHER MEDS:  MEDICATIONS  (STANDING):  acetaminophen     Tablet .. 650 every 6 hours PRN  aluminum hydroxide/magnesium hydroxide/simethicone Suspension 30 every 4 hours PRN  dextrose 50% Injectable 25 once  dextrose 50% Injectable 12.5 once  dextrose 50% Injectable 25 once  dextrose Oral Gel 15 once PRN  enoxaparin Injectable 40 every 24 hours  glucagon  Injectable 1 once  insulin glargine Injectable (LANTUS) 5 at bedtime  insulin lispro (ADMELOG) corrective regimen sliding scale  three times a day before meals  insulin lispro (ADMELOG) corrective regimen sliding scale  at bedtime  insulin lispro Injectable (ADMELOG) 4 three times a day before meals  melatonin 3 at bedtime PRN  mirtazapine 7.5 at bedtime      Vital Signs Last 24 Hrs  T(C): 36.9 (03 Jun 2022 12:10), Max: 37.5 (02 Jun 2022 21:27)  T(F): 98.4 (03 Jun 2022 12:10), Max: 99.5 (02 Jun 2022 21:27)  HR: 70 (03 Jun 2022 12:10) (64 - 72)  BP: 148/62 (03 Jun 2022 12:10) (146/60 - 156/63)  BP(mean): --  RR: 18 (03 Jun 2022 12:10) (18 - 18)  SpO2: 96% (03 Jun 2022 12:10) (95% - 97%)    PHYSICAL EXAM:  General: ill appearing NAD, Non-toxic  Neurology: A&Ox3, nonfocal  Respiratory: Clear to auscultation bilaterally  CV: RRR, S1S2, no murmurs, rubs or gallops  Abdominal: Soft, Non-tender, non-distended, normal bowel sounds  Extremities: No edema,  Line Sites: Clear  Skin: No rash                        11.3   13.38 )-----------( 515      ( 03 Jun 2022 06:46 )             35.3   WBC Count: 13.38 (06-03 @ 06:46)  WBC Count: 12.74 (06-02 @ 07:16)  WBC Count: 13.33 (06-01 @ 07:12)  WBC Count: 17.12 (05-31 @ 07:15)  WBC Count: 13.54 (05-30 @ 07:47)    06-03    139  |  101  |  11  ----------------------------<  65<L>  4.2   |  28  |  0.79    Ca    8.5      03 Jun 2022 06:50  Phos  3.5     06-03  Mg     2.1     06-03    TPro  5.8<L>  /  Alb  2.5<L>  /  TBili  0.2  /  DBili  x   /  AST  20  /  ALT  10  /  AlkPhos  82  06-03      MICROBIOLOGY:  .Stool Feces  05-26-22   No enteric pathogens isolated.  (Stool culture examined for Salmonella,  Shigella, Campylobacter, Aeromonas, Plesiomonas,  Vibrio, E.coli O157 and Yersinia)  --  --      .Blood Blood-Venous  05-24-22   No Growth Final  --  --      Clean Catch Clean Catch (Midstream)  05-23-22   No growth  --  --    .Blood Blood-Peripheral  05-23-22   Growth in aerobic bottle: Staphylococcus hominis  Coag Negative Staphylococcus  Single set isolate, possible contaminant.       .Blood Blood-Peripheral  05-23-22   No Growth Final  --  --    (05.26.22 @ 17:07) Stool Feces GI PCR Results: NOT detected    RADIOLOGY: < from: CT Abdomen and Pelvis w/ Oral Cont and w/ IV Cont (05.28.22 @ 18:54) >  IMPRESSION:  Worsening distal ileitis. No intramural abscess.    Small loculated ascites.    < end of copied text >      Benson Mcgraw MD; Division of Infectious Disease; Pager: [phone]; nights and weekends: [phone] History of diverticulitis with abscess requiring resection of colon hemicolectomy moiety sigmoid status post reanastomosis doing well no bowel issues at this time

## 2023-05-17 NOTE — PROGRESS NOTE ADULT - SUBJECTIVE AND OBJECTIVE BOX
Interval Events:   No acute events overnight following colonoscopy.  Patient without acute symptoms at this time.    ROS:   12 point review of systems performed and negative except otherwise noted in HPI.    Hospital Medications:  acetaminophen     Tablet .. 650 milliGRAM(s) Oral every 6 hours PRN  aluminum hydroxide/magnesium hydroxide/simethicone Suspension 30 milliLiter(s) Oral every 4 hours PRN  cefepime   IVPB 1000 milliGRAM(s) IV Intermittent every 12 hours  dextrose 5%. 1000 milliLiter(s) IV Continuous <Continuous>  dextrose 5%. 1000 milliLiter(s) IV Continuous <Continuous>  dextrose 50% Injectable 25 Gram(s) IV Push once  dextrose 50% Injectable 12.5 Gram(s) IV Push once  dextrose 50% Injectable 25 Gram(s) IV Push once  dextrose Oral Gel 15 Gram(s) Oral once PRN  enoxaparin Injectable 40 milliGRAM(s) SubCutaneous every 24 hours  glucagon  Injectable 1 milliGRAM(s) IntraMuscular once  hydrocortisone 1% Cream 1 Application(s) Topical two times a day  insulin glargine Injectable (LANTUS) 5 Unit(s) SubCutaneous at bedtime  insulin lispro (ADMELOG) corrective regimen sliding scale   SubCutaneous three times a day before meals  insulin lispro (ADMELOG) corrective regimen sliding scale   SubCutaneous at bedtime  insulin lispro Injectable (ADMELOG) 4 Unit(s) SubCutaneous three times a day before meals  melatonin 3 milliGRAM(s) Oral at bedtime PRN  metroNIDAZOLE  IVPB 500 milliGRAM(s) IV Intermittent every 12 hours  mirtazapine 7.5 milliGRAM(s) Oral at bedtime      PHYSICAL EXAM:   Vital Signs:  Vital Signs Last 24 Hrs  T(C): 36.8 (03 Jun 2022 05:18), Max: 37.5 (02 Jun 2022 21:27)  T(F): 98.2 (03 Jun 2022 05:18), Max: 99.5 (02 Jun 2022 21:27)  HR: 64 (03 Jun 2022 05:18) (62 - 90)  BP: 146/60 (03 Jun 2022 05:18) (97/31 - 156/63)  BP(mean): --  RR: 18 (03 Jun 2022 05:18) (16 - 20)  SpO2: 97% (03 Jun 2022 05:18) (94% - 97%)  Daily Height in cm: 157.48 (02 Jun 2022 13:24)    Daily     GENERAL: no acute distress  NEURO: alert  HEENT: NCAT, no conjunctival pallor appreciated  CHEST: no respiratory distress, no accessory muscle use  CARDIAC: regular rate, +S1/S2  ABDOMEN: soft, nondistended, nontender, no rebound or guarding  EXTREMITIES: warm, well perfused  SKIN: no lesions noted    LABS: reviewed                        11.3   13.38 )-----------( 515      ( 03 Jun 2022 06:46 )             35.3     06-03    139  |  101  |  11  ----------------------------<  65<L>  4.2   |  28  |  0.79    Ca    8.5      03 Jun 2022 06:50  Phos  3.5     06-03  Mg     2.1     06-03    TPro  5.8<L>  /  Alb  2.5<L>  /  TBili  0.2  /  DBili  x   /  AST  20  /  ALT  10  /  AlkPhos  82  06-03    LIVER FUNCTIONS - ( 03 Jun 2022 06:50 )  Alb: 2.5 g/dL / Pro: 5.8 g/dL / ALK PHOS: 82 U/L / ALT: 10 U/L / AST: 20 U/L / GGT: x             Interval Diagnostic Studies: see sunrise for full report   1 Principal Discharge DX:	Left flank pain

## 2023-09-30 ENCOUNTER — EMERGENCY (EMERGENCY)
Facility: HOSPITAL | Age: 88
LOS: 1 days | Discharge: HOME | End: 2023-09-30
Attending: EMERGENCY MEDICINE
Payer: COMMERCIAL

## 2023-09-30 VITALS
HEART RATE: 72 BPM | WEIGHT: 214.07 LBS | SYSTOLIC BLOOD PRESSURE: 181 MMHG | HEIGHT: 62 IN | TEMPERATURE: 97 F | RESPIRATION RATE: 16 BRPM | OXYGEN SATURATION: 99 % | DIASTOLIC BLOOD PRESSURE: 96 MMHG

## 2023-09-30 VITALS
HEART RATE: 56 BPM | TEMPERATURE: 98 F | OXYGEN SATURATION: 97 % | DIASTOLIC BLOOD PRESSURE: 84 MMHG | RESPIRATION RATE: 16 BRPM | SYSTOLIC BLOOD PRESSURE: 173 MMHG

## 2023-09-30 DIAGNOSIS — Z96.659 PRESENCE OF UNSPECIFIED ARTIFICIAL KNEE JOINT: Chronic | ICD-10-CM

## 2023-09-30 LAB
ALBUMIN SERPL ELPH-MCNC: 3.8 G/DL — SIGNIFICANT CHANGE UP (ref 3.3–5)
ALP SERPL-CCNC: 105 U/L — SIGNIFICANT CHANGE UP (ref 40–120)
ALT FLD-CCNC: 15 U/L — SIGNIFICANT CHANGE UP (ref 10–45)
ANION GAP SERPL CALC-SCNC: 11 MMOL/L — SIGNIFICANT CHANGE UP (ref 5–17)
AST SERPL-CCNC: 17 U/L — SIGNIFICANT CHANGE UP (ref 10–40)
BASOPHILS # BLD AUTO: 0.05 K/UL — SIGNIFICANT CHANGE UP (ref 0–0.2)
BASOPHILS NFR BLD AUTO: 0.6 % — SIGNIFICANT CHANGE UP (ref 0–2)
BILIRUB SERPL-MCNC: 0.4 MG/DL — SIGNIFICANT CHANGE UP (ref 0.2–1.2)
BUN SERPL-MCNC: 19 MG/DL — SIGNIFICANT CHANGE UP (ref 7–23)
CALCIUM SERPL-MCNC: 9.5 MG/DL — SIGNIFICANT CHANGE UP (ref 8.4–10.5)
CHLORIDE SERPL-SCNC: 100 MMOL/L — SIGNIFICANT CHANGE UP (ref 96–108)
CO2 SERPL-SCNC: 25 MMOL/L — SIGNIFICANT CHANGE UP (ref 22–31)
CREAT SERPL-MCNC: 1.05 MG/DL — SIGNIFICANT CHANGE UP (ref 0.5–1.3)
CRP SERPL-MCNC: <3 MG/L — SIGNIFICANT CHANGE UP (ref 0–4)
EGFR: 51 ML/MIN/1.73M2 — LOW
EOSINOPHIL # BLD AUTO: 0.25 K/UL — SIGNIFICANT CHANGE UP (ref 0–0.5)
EOSINOPHIL NFR BLD AUTO: 2.8 % — SIGNIFICANT CHANGE UP (ref 0–6)
ERYTHROCYTE [SEDIMENTATION RATE] IN BLOOD: 33 MM/HR — HIGH (ref 0–20)
GLUCOSE SERPL-MCNC: 335 MG/DL — HIGH (ref 70–99)
HCT VFR BLD CALC: 40.7 % — SIGNIFICANT CHANGE UP (ref 34.5–45)
HGB BLD-MCNC: 13.4 G/DL — SIGNIFICANT CHANGE UP (ref 11.5–15.5)
IMM GRANULOCYTES NFR BLD AUTO: 0.6 % — SIGNIFICANT CHANGE UP (ref 0–0.9)
LYMPHOCYTES # BLD AUTO: 1.32 K/UL — SIGNIFICANT CHANGE UP (ref 1–3.3)
LYMPHOCYTES # BLD AUTO: 14.9 % — SIGNIFICANT CHANGE UP (ref 13–44)
MCHC RBC-ENTMCNC: 30.7 PG — SIGNIFICANT CHANGE UP (ref 27–34)
MCHC RBC-ENTMCNC: 32.9 GM/DL — SIGNIFICANT CHANGE UP (ref 32–36)
MCV RBC AUTO: 93.3 FL — SIGNIFICANT CHANGE UP (ref 80–100)
MONOCYTES # BLD AUTO: 0.63 K/UL — SIGNIFICANT CHANGE UP (ref 0–0.9)
MONOCYTES NFR BLD AUTO: 7.1 % — SIGNIFICANT CHANGE UP (ref 2–14)
NEUTROPHILS # BLD AUTO: 6.53 K/UL — SIGNIFICANT CHANGE UP (ref 1.8–7.4)
NEUTROPHILS NFR BLD AUTO: 74 % — SIGNIFICANT CHANGE UP (ref 43–77)
NRBC # BLD: 0 /100 WBCS — SIGNIFICANT CHANGE UP (ref 0–0)
PLATELET # BLD AUTO: 242 K/UL — SIGNIFICANT CHANGE UP (ref 150–400)
POTASSIUM SERPL-MCNC: 5.1 MMOL/L — SIGNIFICANT CHANGE UP (ref 3.5–5.3)
POTASSIUM SERPL-SCNC: 5.1 MMOL/L — SIGNIFICANT CHANGE UP (ref 3.5–5.3)
PROT SERPL-MCNC: 6.6 G/DL — SIGNIFICANT CHANGE UP (ref 6–8.3)
RBC # BLD: 4.36 M/UL — SIGNIFICANT CHANGE UP (ref 3.8–5.2)
RBC # FLD: 12.1 % — SIGNIFICANT CHANGE UP (ref 10.3–14.5)
SODIUM SERPL-SCNC: 136 MMOL/L — SIGNIFICANT CHANGE UP (ref 135–145)
WBC # BLD: 8.83 K/UL — SIGNIFICANT CHANGE UP (ref 3.8–10.5)
WBC # FLD AUTO: 8.83 K/UL — SIGNIFICANT CHANGE UP (ref 3.8–10.5)

## 2023-09-30 PROCEDURE — 96374 THER/PROPH/DIAG INJ IV PUSH: CPT | Mod: XU

## 2023-09-30 PROCEDURE — 86140 C-REACTIVE PROTEIN: CPT

## 2023-09-30 PROCEDURE — 87205 SMEAR GRAM STAIN: CPT

## 2023-09-30 PROCEDURE — 73630 X-RAY EXAM OF FOOT: CPT | Mod: 26,LT

## 2023-09-30 PROCEDURE — 80053 COMPREHEN METABOLIC PANEL: CPT

## 2023-09-30 PROCEDURE — 87070 CULTURE OTHR SPECIMN AEROBIC: CPT

## 2023-09-30 PROCEDURE — 85652 RBC SED RATE AUTOMATED: CPT

## 2023-09-30 PROCEDURE — 99284 EMERGENCY DEPT VISIT MOD MDM: CPT | Mod: 25

## 2023-09-30 PROCEDURE — 99285 EMERGENCY DEPT VISIT HI MDM: CPT

## 2023-09-30 PROCEDURE — 84550 ASSAY OF BLOOD/URIC ACID: CPT

## 2023-09-30 PROCEDURE — 85025 COMPLETE CBC W/AUTO DIFF WBC: CPT

## 2023-09-30 PROCEDURE — 73630 X-RAY EXAM OF FOOT: CPT

## 2023-09-30 PROCEDURE — 11730 AVULSION NAIL PLATE SIMPLE 1: CPT | Mod: TA

## 2023-09-30 RX ORDER — AMPICILLIN SODIUM AND SULBACTAM SODIUM 250; 125 MG/ML; MG/ML
3 INJECTION, POWDER, FOR SUSPENSION INTRAMUSCULAR; INTRAVENOUS ONCE
Refills: 0 | Status: COMPLETED | OUTPATIENT
Start: 2023-09-30 | End: 2023-09-30

## 2023-09-30 RX ADMIN — AMPICILLIN SODIUM AND SULBACTAM SODIUM 200 GRAM(S): 250; 125 INJECTION, POWDER, FOR SUSPENSION INTRAMUSCULAR; INTRAVENOUS at 16:58

## 2023-09-30 NOTE — CONSULT NOTE ADULT - SUBJECTIVE AND OBJECTIVE BOX
Patient is a 88y old  Female who presents with a chief complaint of left hallux pain     HPI:    88-year-old female with a past medical history of diabetes presenting with a red swollen toe. Patient noticed her left lower extremity first digit became red approximately 1 month ago. Over the last week it became increasingly more red and swollen. Patient denies fevers, nausea, vomiting, trauma.    PAST MEDICAL & SURGICAL HISTORY:  Diabetes      S/P TKR (total knee replacement)          MEDICATIONS  (STANDING):  ampicillin/sulbactam  IVPB 3 Gram(s) IV Intermittent once    MEDICATIONS  (PRN):      Allergies    No Known Allergies    Intolerances        VITALS:    Vital Signs Last 24 Hrs  T(C): 36.3 (30 Sep 2023 12:51), Max: 36.3 (30 Sep 2023 12:51)  T(F): 97.3 (30 Sep 2023 12:51), Max: 97.3 (30 Sep 2023 12:51)  HR: 72 (30 Sep 2023 12:51) (72 - 72)  BP: 181/96 (30 Sep 2023 12:51) (181/96 - 181/96)  BP(mean): --  RR: 16 (30 Sep 2023 12:51) (16 - 16)  SpO2: 99% (30 Sep 2023 12:51) (99% - 99%)    Parameters below as of 30 Sep 2023 12:51  Patient On (Oxygen Delivery Method): room air        LABS:                          13.4   8.83  )-----------( 242      ( 30 Sep 2023 14:38 )             40.7       09-30    136  |  100  |  19  ----------------------------<  335<H>  5.1   |  25  |  1.05    Ca    9.5      30 Sep 2023 14:38    TPro  6.6  /  Alb  3.8  /  TBili  0.4  /  DBili  x   /  AST  17  /  ALT  15  /  AlkPhos  105  09-30      CAPILLARY BLOOD GLUCOSE              LOWER EXTREMITY PHYSICAL EXAM:    Vascular: DP/PT 2/4, B/L, CFT <3 seconds B/L, Temperature gradient warm to cool, B/L.   Neuro: Epicritic sensation diminshed to the level of digits B/L.  Musculoskeletal/Ortho: unremarkable   Skin: erythema and edema localized to left hallux. no open lesion or signs of infection of right foot.       RADIOLOGY & ADDITIONAL STUDIES:    < from: Xray Foot AP + Lateral + Oblique, Left (09.30.23 @ 15:58) >  ACC: [id number] EXAM:  XR FOOT COMP MIN 3 VIEWS LT   ORDERED BY: TARA LLAMAS     PROCEDURE DATE:  09/30/2023          INTERPRETATION:  Clinical indications: First digit. Edema and pain.    3 views of the left foot were performed.    Findings:    There is soft tissue swelling about the first toe. There is no evidence   of osseous erosion or destruction. There is no evidence of acute fracture   or dislocation. Joint spaces are preserved. There is a plantar calcaneal   spur. There is atherosclerotic disease.    IMPRESSION:    No evidence of acute fracture. No radiographic evidence of osteomyelitis.    --- End of Report ---            KYRIE GAMA MD; Attending Radiologist  This document has been electronically signed. Sep 30 2023  4:14PM    < end of copied text >   Patient is a 88y old  Female who presents with a chief complaint of left hallux pain     HPI:    88-year-old female with a past medical history of diabetes presenting with a red swollen toe. Patient noticed her left lower extremity first digit became red approximately 1 month ago. Over the last week it became increasingly more red and swollen. Patient denies fevers, nausea, vomiting, trauma.    PAST MEDICAL & SURGICAL HISTORY:  Diabetes  S/P TKR (total knee replacement)      MEDICATIONS  (STANDING):  ampicillin/sulbactam  IVPB 3 Gram(s) IV Intermittent once    MEDICATIONS  (PRN):    Allergies    No Known Allergies    Intolerances        VITALS:    Vital Signs Last 24 Hrs  T(C): 36.3 (30 Sep 2023 12:51), Max: 36.3 (30 Sep 2023 12:51)  T(F): 97.3 (30 Sep 2023 12:51), Max: 97.3 (30 Sep 2023 12:51)  HR: 72 (30 Sep 2023 12:51) (72 - 72)  BP: 181/96 (30 Sep 2023 12:51) (181/96 - 181/96)  BP(mean): --  RR: 16 (30 Sep 2023 12:51) (16 - 16)  SpO2: 99% (30 Sep 2023 12:51) (99% - 99%)    Parameters below as of 30 Sep 2023 12:51  Patient On (Oxygen Delivery Method): room air        LABS:                          13.4   8.83  )-----------( 242      ( 30 Sep 2023 14:38 )             40.7       09-30    136  |  100  |  19  ----------------------------<  335<H>  5.1   |  25  |  1.05    Ca    9.5      30 Sep 2023 14:38    TPro  6.6  /  Alb  3.8  /  TBili  0.4  /  DBili  x   /  AST  17  /  ALT  15  /  AlkPhos  105  09-30      CAPILLARY BLOOD GLUCOSE      LOWER EXTREMITY PHYSICAL EXAM:    Vascular: DP/PT 2/4, B/L, CFT <3 seconds B/L, Temperature gradient warm to cool, B/L.   Neuro: Epicritic sensation diminshed to the level of digits B/L.  Musculoskeletal/Ortho: unremarkable   Skin: erythema and edema localized to left hallux up to the IPJ. Purulent drainage from the proximal nail border. No ulcers/ nail bed lacerations/ probe to bone/ fluctuance/ soft tissue crepitus/ necrosis      RADIOLOGY & ADDITIONAL STUDIES:    < from: Xray Foot AP + Lateral + Oblique, Left (09.30.23 @ 15:58) >  ACC: [id number] EXAM:  XR FOOT COMP MIN 3 VIEWS LT   ORDERED BY: TARA LLAMAS     PROCEDURE DATE:  09/30/2023          INTERPRETATION:  Clinical indications: First digit. Edema and pain.    3 views of the left foot were performed.    Findings:    There is soft tissue swelling about the first toe. There is no evidence   of osseous erosion or destruction. There is no evidence of acute fracture   or dislocation. Joint spaces are preserved. There is a plantar calcaneal   spur. There is atherosclerotic disease.    IMPRESSION:    No evidence of acute fracture. No radiographic evidence of osteomyelitis.    --- End of Report ---    KYRIE GAMA MD; Attending Radiologist  This document has been electronically signed. Sep 30 2023  4:14PM    < end of copied text >

## 2023-09-30 NOTE — ED PROVIDER NOTE - CARE PROVIDER_API CALL
Mateusz Landeros  Foot Surgery  [address] [place] [address]  Phone: [phone]  Fax: [phone]  Follow Up Time: 4-6 Days

## 2023-09-30 NOTE — ED PROVIDER NOTE - ATTENDING CONTRIBUTION TO CARE
89 yo female with DM p/w L hallux erythema/pain.  ?purulence noted under nail.  send CBC, CMP, podiatry consult for nail removal/culture, antibiotics as reassess.

## 2023-09-30 NOTE — ED ADULT NURSE NOTE - OBJECTIVE STATEMENT
87 yo presents to the ED from home. A&Ox4, ambulatory c/o toe pain. history of diabetes on oral meds. Patient noticed her left lower extremity first digit became red approximately 1 month ago. Over the last week it became increasingly more red and swollen. Patient denies fevers, nausea, vomiting, trauma. 20G inserted RAC. Patient undressed and placed into gown, call bell in hand and side rails up for safety. warm blanket provided, vital signs stable, pt in no acute distress.

## 2023-09-30 NOTE — ED PROVIDER NOTE - NSFOLLOWUPINSTRUCTIONS_ED_ALL_ED_FT
Please follow up with Dr. Landeros in 7 days.    Antibiotics have been sent to your pharmacy, please take as directed.     You may take Tylenol (1000mg) every 6 hours or Ibuprofen (400mg) every 8 hours for pain control.     Please return to the emergency department if you experience any of the following symptoms:    Fever  Chest pain  Difficulty breathing  Abdominal pain  Nausea  Vomiting

## 2023-09-30 NOTE — CONSULT NOTE ADULT - ASSESSMENT
88F presents with Left hallux paronychia   - Patient seen and evaluated  - afebrile, no leukocytosis   - erythema and edema localized to left hallux. no open lesion or signs of infection of right foot.   - Left Foot xray: no fracture, no OM  - 8cc 1% lidocaine plain block given prior to full nail avulsion of left hallux, 1cc pus expressed, patient tolerated procedure well.   - Left hallux proximal nail bed cultured  - Left hallux dressed with xeroform, 4x4 gauze, dylon, and ACE bandage  - dressing to remain clean, dry, and intact for 24 hours  - after 24 hours, remove dressing and soak in epson salt water daily followed by application of bacitracin/neosporin and bandaid  - recommend one dose of IV unasyn in ED, upon discharge augmentin for 10 days.   - recommend over the counter pain medication as needed  - follow up within 1 week of discharge, ideally within 3 days with Dr. Reyes ([phone])   - discussed with attending  88F presents with Left hallux paronychia   - Patient seen and evaluated  - afebrile, no leukocytosis   - Left Foot xray: no fracture, no OM  - 8cc 1% lidocaine plain block given prior to full nail avulsion of left hallux, 1cc pus expressed, patient tolerated procedure well.   - Left hallux proximal nail bed cultured  - Left hallux dressed with xeroform, 4x4 gauze, dylon, and ACE bandage  - dressing to remain clean, dry, and intact for 24 hours  - after 24 hours, remove dressing and soak in epson salt water daily followed by application of bacitracin/neosporin and bandaid  - recommend one dose of IV unasyn in ED, upon discharge augmentin for 10 days.   - recommend over the counter pain medication as needed  - follow up within 1 week of discharge, ideally within 3 days with Dr. Reyes ([phone])   - discussed with attending

## 2023-09-30 NOTE — ED PROVIDER NOTE - OBJECTIVE STATEMENT
88-year-old female with a past medical history of diabetes presenting with a red swollen toe. Patient noticed her left lower extremity first digit became red approximately 1 month ago. Over the last week it became increasingly more red and swollen. Patient denies fevers, nausea, vomiting, trauma.

## 2023-09-30 NOTE — ED PROVIDER NOTE - PATIENT PORTAL LINK FT
You can access the FollowMyHealth Patient Portal offered by [facility] by registering at the following website: [email or web address]. By joining [employer]’s FollowMyHealth portal, you will also be able to view your health information using other applications (apps) compatible with our system.

## 2023-09-30 NOTE — ED PROVIDER NOTE - CLINICAL SUMMARY MEDICAL DECISION MAKING FREE TEXT BOX
88-year-old female with a past medical history of diabetes presenting with an erythematous swollen left first digit of the lower extremity. Patient states been present for 1 month and worsened over the last week. Patient denying fevers, trauma, nausea, vomiting. CBC, CMP, ESR, CRP, x-ray, podiatry consult.

## 2023-09-30 NOTE — ED PROVIDER NOTE - PHYSICAL EXAMINATION
General: Appears well and nontoxic  Mentation: AAO x 3  psych: mood appropriate  HEENT: airway patent, conjunctivae clear bilaterally  Resp: symmetric chest rise, no resp distress, breath sounds CTA bilaterally  Cardio: RRR, no m/r/g  GI: soft/nondistended/nontender  Neuro: sensation and motor function grossly intact  Skin: Left VANESSA first digit erythema  MSK: normal movement of all extremities  Lymph/Vasc: no LE edema

## 2023-09-30 NOTE — ED PROVIDER NOTE - PROGRESS NOTE DETAILS
Dr. Marsh: Received signout from Dr. Hummel.  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  No history of gout.  No trauma, no fevers or chills.  Patient seen at bedside, accompanied by , very well-appearing, left toe with minimal erythema and warmth and onychomycosis of the nail of the great toe.  No tenderness to palpation, cap refill less than 2 seconds.  Discussed with podiatry, awaiting podiatry recommendations for antibiotics.  Patient is very nontoxic appearing, has not had p.o. antibiotics yet, will depending on podiatry recs will consider discharging patient on p.o. antibiotics.  Patient and  are agreeable with this plan.  X-ray results reviewed, no signs of osteomyelitis on x-ray.  Labs reviewed, normal CRP noted, osteo.

## 2023-10-01 LAB
GRAM STN FLD: SIGNIFICANT CHANGE UP
SPECIMEN SOURCE: SIGNIFICANT CHANGE UP

## 2023-10-05 LAB
CULTURE RESULTS: SIGNIFICANT CHANGE UP
SPECIMEN SOURCE: SIGNIFICANT CHANGE UP

## 2023-10-06 ENCOUNTER — APPOINTMENT (OUTPATIENT)
Dept: PODIATRY | Facility: CLINIC | Age: 88
End: 2023-10-06
Payer: MEDICARE

## 2023-10-06 DIAGNOSIS — L03.032 CELLULITIS OF LEFT TOE: ICD-10-CM

## 2023-10-06 DIAGNOSIS — M79.2 NEURALGIA AND NEURITIS, UNSPECIFIED: ICD-10-CM

## 2023-10-06 DIAGNOSIS — Z86.39 PERSONAL HISTORY OF OTHER ENDOCRINE, NUTRITIONAL AND METABOLIC DISEASE: ICD-10-CM

## 2023-10-06 PROCEDURE — 99203 OFFICE O/P NEW LOW 30 MIN: CPT

## 2023-10-06 RX ORDER — MUPIROCIN 20 MG/G
2 OINTMENT TOPICAL
Qty: 1 | Refills: 1 | Status: ACTIVE | COMMUNITY
Start: 2023-10-06 | End: 1900-01-01

## 2023-10-13 PROBLEM — L03.032 PARONYCHIA OF GREAT TOE, LEFT: Status: ACTIVE | Noted: 2023-10-09

## 2023-10-13 PROBLEM — M79.2 NEURALGIA: Status: ACTIVE | Noted: 2023-10-09

## 2024-01-30 ENCOUNTER — APPOINTMENT (OUTPATIENT)
Dept: INTERNAL MEDICINE | Facility: CLINIC | Age: 89
End: 2024-01-30
Payer: MEDICARE

## 2024-01-30 VITALS
OXYGEN SATURATION: 97 % | TEMPERATURE: 97.6 F | HEART RATE: 82 BPM | BODY MASS INDEX: 40.3 KG/M2 | HEIGHT: 62 IN | WEIGHT: 219 LBS | RESPIRATION RATE: 12 BRPM

## 2024-01-30 VITALS — DIASTOLIC BLOOD PRESSURE: 85 MMHG | SYSTOLIC BLOOD PRESSURE: 135 MMHG

## 2024-01-30 DIAGNOSIS — Z00.00 ENCOUNTER FOR GENERAL ADULT MEDICAL EXAMINATION W/OUT ABNORMAL FINDINGS: ICD-10-CM

## 2024-01-30 DIAGNOSIS — R19.7 DIARRHEA, UNSPECIFIED: ICD-10-CM

## 2024-01-30 PROCEDURE — G0444 DEPRESSION SCREEN ANNUAL: CPT | Mod: 59

## 2024-01-30 PROCEDURE — 99397 PER PM REEVAL EST PAT 65+ YR: CPT

## 2024-01-30 RX ORDER — EMPAGLIFLOZIN 10 MG/1
10 TABLET, FILM COATED ORAL
Qty: 30 | Refills: 2 | Status: DISCONTINUED | COMMUNITY
Start: 2022-06-27 | End: 2024-01-30

## 2024-01-30 RX ORDER — METFORMIN HYDROCHLORIDE 500 MG/1
500 TABLET, COATED ORAL DAILY
Qty: 60 | Refills: 2 | Status: DISCONTINUED | COMMUNITY
End: 2024-01-30

## 2024-01-31 NOTE — PLAN
[FreeTextEntry1] : See plan  1.  Diabetes continue Prandin and Amaryl for now Will monitor hemoglobin A1c will consider  changing meds after blood work  Home glucose monitoring advised cardiology and endocrinology referral  2.  Hypertension Off meds cardio referral  3. Chronic diarrhea diet d/w pt GI referral

## 2024-01-31 NOTE — HEALTH RISK ASSESSMENT
[Fair] :  ~his/her~ mood as fair [No] : No [No falls in past year] : Patient reported no falls in the past year [0] : 2) Feeling down, depressed, or hopeless: Not at all (0) [PHQ-2 Negative - No further assessment needed] : PHQ-2 Negative - No further assessment needed [Patient reported colonoscopy was abnormal] : Patient reported colonoscopy was abnormal [Alone] : lives alone [Retired] : retired [High School] : high school [] :  [# Of Children ___] : has [unfilled] children [Feels Safe at Home] : Feels safe at home [Fully functional (bathing, dressing, toileting, transferring, walking, feeding)] : Fully functional (bathing, dressing, toileting, transferring, walking, feeding) [Independent] : managing finances [Full assistance needed] : using transportation [Reports normal functional visual acuity (ie: able to read med bottle)] : Reports normal functional visual acuity [Smoke Detector] : smoke detector [Carbon Monoxide Detector] : carbon monoxide detector [Safety elements used in home] : safety elements used in home [Seat Belt] :  uses seat belt [Sunscreen] : uses sunscreen [Never] : Never [de-identified] : Admits she is not active.  [de-identified] : Admits her diet could be better.  [[id number]] : 0 [Reports changes in hearing] : Reports no changes in hearing [Reports changes in vision] : Reports no changes in vision [Reports changes in dental health] : Reports no changes in dental health [Guns at Home] : no guns at home [Travel to Developing Areas] : does not  travel to developing areas [TB Exposure] : is not being exposed to tuberculosis [Caregiver Concerns] : does not have caregiver concerns [MammogramDate] : doesn't recall [BoneDensityDate] : never [ColonoscopyDate] : 06/2022 [ColonoscopyComments] : Diverticulosis, benign polyp removed.  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 Wears bifocal lenses. [de-identified] : Upper dentures.

## 2024-01-31 NOTE — HISTORY OF PRESENT ILLNESS
[de-identified] : Ms. DONNA PEREZ is an 88-year-old female with hx. of DM II, HTN, HLD, anemia, and insomnia, who presents for an annual physical exam, Rx refills.   Pt c/o chronic constant diarrhea for three years. She claims that she has diarrhea multiple times a day. Pt states that she has lost about 40 lbs in about one year.  Is not taking any medications for the diarrhea. Denies ever having a colonoscopy. Denies abd pain when having diarrhea. Denies blood in stool. Denies black stool. Pt had colonoscopy 2022  Pt has a hx of DM II for a decade. She is currently only taking Amaryl 4 MG and Prandin 1 MG 2x daily. Do not check sugar at home. Diet mainly consists of eggs, chicken, ensure, and oatmeal with skim milk. Denies eating large quantities of cheese or dairy.   She lives by herself and has an aide who helps her for 4 hours daily. Her daughter, who lives in [place], delivers her packaged foods.  Denies nausea, vomiting, constipation, CP/ SOB.

## 2024-01-31 NOTE — REVIEW OF SYSTEMS
[Recent Change In Weight] : ~T recent weight change [Diarrhea] : diarrhea [Negative] : Heme/Lymph [FreeTextEntry2] : 40 lbs lost

## 2024-01-31 NOTE — PHYSICAL EXAM
[No Acute Distress] : no acute distress [Well Nourished] : well nourished [Normal Sclera/Conjunctiva] : normal sclera/conjunctiva [EOMI] : extraocular movements intact [Normal Outer Ear/Nose] : the outer ears and nose were normal in appearance [Normal Oropharynx] : the oropharynx was normal [Normal TMs] : both tympanic membranes were normal [No JVD] : no jugular venous distention [No Lymphadenopathy] : no lymphadenopathy [Supple] : supple [Thyroid Normal, No Nodules] : the thyroid was normal and there were no nodules present [No Respiratory Distress] : no respiratory distress  [No Accessory Muscle Use] : no accessory muscle use [Clear to Auscultation] : lungs were clear to auscultation bilaterally [Normal Rate] : normal rate  [Regular Rhythm] : with a regular rhythm [Normal S1, S2] : normal S1 and S2 [No Murmur] : no murmur heard [Pedal Pulses Present] : the pedal pulses are present [No Edema] : there was no peripheral edema [Soft] : abdomen soft [Non Tender] : non-tender [Non-distended] : non-distended [Normal Bowel Sounds] : normal bowel sounds [Normal Posterior Cervical Nodes] : no posterior cervical lymphadenopathy [Normal Anterior Cervical Nodes] : no anterior cervical lymphadenopathy [No CVA Tenderness] : no CVA  tenderness [No Spinal Tenderness] : no spinal tenderness [No Joint Swelling] : no joint swelling [Grossly Normal Strength/Tone] : grossly normal strength/tone [No Rash] : no rash [No Focal Deficits] : no focal deficits [Normal Affect] : the affect was normal [Normal Insight/Judgement] : insight and judgment were intact [de-identified] : Ambulates with walker

## 2024-02-02 ENCOUNTER — APPOINTMENT (OUTPATIENT)
Dept: INTERNAL MEDICINE | Facility: CLINIC | Age: 89
End: 2024-02-02
Payer: MEDICARE

## 2024-02-02 PROCEDURE — 99214 OFFICE O/P EST MOD 30 MIN: CPT

## 2024-02-02 RX ORDER — INSULIN GLARGINE 100 [IU]/ML
100 INJECTION, SOLUTION SUBCUTANEOUS
Qty: 5 | Refills: 1 | Status: ACTIVE | COMMUNITY
Start: 2024-02-02 | End: 1900-01-01

## 2024-02-02 RX ORDER — BLOOD SUGAR DIAGNOSTIC
STRIP MISCELLANEOUS DAILY
Qty: 1 | Refills: 2 | Status: ACTIVE | COMMUNITY
Start: 2024-02-02 | End: 1900-01-01

## 2024-02-02 RX ORDER — LANCETS 33 GAUGE
EACH MISCELLANEOUS
Qty: 1 | Refills: 2 | Status: ACTIVE | COMMUNITY
Start: 2024-02-02 | End: 1900-01-01

## 2024-02-02 RX ORDER — BLOOD-GLUCOSE METER
W/DEVICE KIT MISCELLANEOUS
Qty: 1 | Refills: 0 | Status: ACTIVE | COMMUNITY
Start: 2024-02-02 | End: 1900-01-01

## 2024-02-02 RX ORDER — ISOPROPYL ALCOHOL 70 ML/100ML
SWAB TOPICAL
Qty: 100 | Refills: 2 | Status: ACTIVE | COMMUNITY
Start: 2024-02-02 | End: 1900-01-01

## 2024-02-04 NOTE — HISTORY OF PRESENT ILLNESS
[Home] : at home, [unfilled] , at the time of the visit. [Medical Office: ([facility])___] : at the medical office located in  [de-identified] : Ms. DONNA PEREZ is an 88-year-old female with hx. of DM II, HTN, HLD, anemia, and insomnia, who presents for a follow up visit via [employer].  Recent bloodwork showed that Hb A1c is 12 and sugar level was 300. Patient's DM II is uncontrolled. She is currently taking Amaryl 4 mg and Prandin 1 MG TID for DM II. Do not check sugar at home. Diet mainly consists of eggs, chicken, ensure, and oatmeal with skim milk. Denies eating large quantities of cheese or dairy. Denies drinking any sugary drinks.   She lives by herself and has an aide who helps her for 4 hours daily. Her daughter, who lives in [place], delivers her packaged foods. Pt's son states that he and his sister will be helping her starting next week. He is unsure if his mother will be compliant with injectable medications including insulin and Mounjaro.  Denies any exertional chest pain, dyspnea, palpitations, headaches, and N /V or abdominal pain

## 2024-02-04 NOTE — PLAN
[FreeTextEntry1] : See plan  1.  Diabetes diabetic education provided  start Lantus solostar 100 unit/ml 10 unit at night cont Amaryl d/c Prandin  Will monitor hemoglobin A1c Home glucose monitoring advised cardiology and endocrinology referral increase hydration Dietary counseling given, dietary avoidance discussed, diet and exercise reviewed with patient; patient reminded of importance of aerobic exercise, weight control, dietary compliance and regular glucose monitoring. Need low carb diet/ exercise.  2.  Depression/insomnia Continue mirtazapine Psych follow-up  3.  Hypertension Off meds Will monitor home BP  4. Hch need low fat/ low cholesterol diet / exercise / repeat fasting lipids in 1 month

## 2024-02-05 LAB
25(OH)D3 SERPL-MCNC: 44.6 NG/ML
ALBUMIN SERPL ELPH-MCNC: 4.4 G/DL
ALP BLD-CCNC: 116 U/L
ALT SERPL-CCNC: 15 U/L
ANION GAP SERPL CALC-SCNC: 22 MMOL/L
AST SERPL-CCNC: 23 U/L
BASOPHILS # BLD AUTO: 0.07 K/UL
BASOPHILS NFR BLD AUTO: 0.8 %
BILIRUB SERPL-MCNC: 0.4 MG/DL
BUN SERPL-MCNC: 19 MG/DL
CALCIUM SERPL-MCNC: 9.8 MG/DL
CHLORIDE SERPL-SCNC: 96 MMOL/L
CHOLEST SERPL-MCNC: 236 MG/DL
CO2 SERPL-SCNC: 19 MMOL/L
CREAT SERPL-MCNC: 1.03 MG/DL
EGFR: 52 ML/MIN/1.73M2
EOSINOPHIL # BLD AUTO: 0.14 K/UL
EOSINOPHIL NFR BLD AUTO: 1.6 %
ESTIMATED AVERAGE GLUCOSE: 298 MG/DL
GLUCOSE SERPL-MCNC: 339 MG/DL
HBA1C MFR BLD HPLC: 12 %
HCT VFR BLD CALC: 43.5 %
HDLC SERPL-MCNC: 72 MG/DL
HGB BLD-MCNC: 14.9 G/DL
IMM GRANULOCYTES NFR BLD AUTO: 0.7 %
LDLC SERPL CALC-MCNC: 135 MG/DL
LYMPHOCYTES # BLD AUTO: 1.53 K/UL
LYMPHOCYTES NFR BLD AUTO: 16.9 %
MAN DIFF?: NORMAL
MCHC RBC-ENTMCNC: 31.4 PG
MCHC RBC-ENTMCNC: 34.3 GM/DL
MCV RBC AUTO: 91.6 FL
MONOCYTES # BLD AUTO: 0.59 K/UL
MONOCYTES NFR BLD AUTO: 6.5 %
NEUTROPHILS # BLD AUTO: 6.64 K/UL
NEUTROPHILS NFR BLD AUTO: 73.5 %
NONHDLC SERPL-MCNC: 163 MG/DL
PLATELET # BLD AUTO: 300 K/UL
POTASSIUM SERPL-SCNC: 4.8 MMOL/L
PROT SERPL-MCNC: 7.1 G/DL
RBC # BLD: 4.75 M/UL
RBC # FLD: 12.4 %
SODIUM SERPL-SCNC: 136 MMOL/L
TRIGL SERPL-MCNC: 162 MG/DL
TSH SERPL-ACNC: 1.17 UIU/ML
VIT B12 SERPL-MCNC: 473 PG/ML
WBC # FLD AUTO: 9.03 K/UL

## 2024-02-06 ENCOUNTER — APPOINTMENT (OUTPATIENT)
Dept: ENDOCRINOLOGY | Facility: CLINIC | Age: 89
End: 2024-02-06
Payer: MEDICARE

## 2024-02-06 VITALS
SYSTOLIC BLOOD PRESSURE: 150 MMHG | RESPIRATION RATE: 12 BRPM | WEIGHT: 219 LBS | HEART RATE: 84 BPM | BODY MASS INDEX: 40.3 KG/M2 | DIASTOLIC BLOOD PRESSURE: 80 MMHG | HEIGHT: 62 IN | OXYGEN SATURATION: 96 %

## 2024-02-06 PROCEDURE — G0108 DIAB MANAGE TRN  PER INDIV: CPT

## 2024-02-06 PROCEDURE — 99204 OFFICE O/P NEW MOD 45 MIN: CPT

## 2024-02-06 RX ORDER — TIRZEPATIDE 2.5 MG/.5ML
2.5 INJECTION, SOLUTION SUBCUTANEOUS
Qty: 1 | Refills: 0 | Status: COMPLETED | COMMUNITY
Start: 2024-02-02 | End: 2024-02-06

## 2024-02-06 RX ORDER — REPAGLINIDE 1 MG/1
1 TABLET ORAL 3 TIMES DAILY
Refills: 0 | Status: COMPLETED | COMMUNITY
End: 2024-02-06

## 2024-02-06 RX ORDER — GLIMEPIRIDE 4 MG/1
4 TABLET ORAL
Refills: 0 | Status: COMPLETED | COMMUNITY
End: 2024-02-06

## 2024-02-06 NOTE — PHYSICAL EXAM
[Alert] : alert [Well Nourished] : well nourished [No Acute Distress] : no acute distress [Well Developed] : well developed [Normal Sclera/Conjunctiva] : normal sclera/conjunctiva [EOMI] : extra ocular movement intact [No Proptosis] : no proptosis [No Neck Mass] : no neck mass was observed [No LAD] : no lymphadenopathy [Supple] : the neck was supple [Thyroid Not Enlarged] : the thyroid was not enlarged [No Thyroid Nodules] : no palpable thyroid nodules [No Respiratory Distress] : no respiratory distress [No Accessory Muscle Use] : no accessory muscle use [Clear to Auscultation] : lungs were clear to auscultation bilaterally [Normal Affect] : the affect was normal [Normal Insight/Judgement] : insight and judgment were intact [Normal Mood] : the mood was normal [Normal] : normal [Full ROM] : with full range of motion [Obese] : obese [Normal Voice/Communication] : normal voice communication [Diminished Throughout Both Feet] : normal tactile sensation with monofilament testing throughout both feet

## 2024-02-06 NOTE — ADDENDUM
[FreeTextEntry1] :  By signing my name below, I, Magaly Johnson, attest that this document has been prepared under the direction and in the presence of Dr. Dee.  I, Ines Dee MD, personally performed the services described in this documentation. All medical record entries made by the scribe were at my discretion and in my presence. I have reviewed the chart and discharge instructions (if applicable) and agree that the record reflects my personal permanence and is accurate and complete.

## 2024-02-06 NOTE — HISTORY OF PRESENT ILLNESS
[FreeTextEntry1] : CC: diabetes  She is accompanied by son.  This is a 88-year-old female with T2DM, HLD, HTN, insomnia, morbid obesity, memory loss, here for consultation.  She was diagnosed with diabetes at least age 78.   She is on Jardiance 10 mg daily and metformin 500 mg 2x a day.  She is supposed to take Lantus 10 units at bedtime but has not started taking it yet.  She does not SMBG.  Recent hemoglobin A1C from January 30th, 2024 is 12%.  Last ophthalmology visit was 2 years ago. No known retinopathy.  No known nephropathy. No known neuropathy.  She is not on a statin.  She is not on ACE inhibitor or ARB.

## 2024-02-06 NOTE — ASSESSMENT
[FreeTextEntry1] : This is a 88-year-old female with T2DM, HLD, HTN, insomnia, morbid obesity, memory loss, here for a consultation.   1. T2DM Recent hemoglobin A1C from January 30th, 2024 is 12%.  She is on Jardiance 10 mg daily and metformin 500 mg 2x a day. Change metformin to ER as she reports diarrhea.  She is supposed to take Lantus 10 units at bedtime but has not started taking it yet. She will meet with CDE today for insulin pen education.  She does not SMBG. Appointment with CDE today for education on glucometer and diet.  LSM for morbid obesity.  Last ophthalmology visit was 2 years ago. No known retinopathy. Follow up advised.  No known nephropathy. Check urine microalbumin.  No known neuropathy. Vitamin B12 level normal.  Will place Wavesat deonte pro CGM today.  She is not on any statin. LDL is not at goal (135). Declines statin.  She is not on ACE inhibitor or ARB. Check DEXA to evaluate for osteoporosis.

## 2024-02-09 ENCOUNTER — APPOINTMENT (OUTPATIENT)
Dept: INTERNAL MEDICINE | Facility: CLINIC | Age: 89
End: 2024-02-09
Payer: MEDICARE

## 2024-02-09 ENCOUNTER — APPOINTMENT (OUTPATIENT)
Dept: GASTROENTEROLOGY | Facility: CLINIC | Age: 89
End: 2024-02-09
Payer: MEDICARE

## 2024-02-09 VITALS
HEIGHT: 62 IN | DIASTOLIC BLOOD PRESSURE: 65 MMHG | OXYGEN SATURATION: 97 % | WEIGHT: 219 LBS | TEMPERATURE: 97.6 F | SYSTOLIC BLOOD PRESSURE: 156 MMHG | BODY MASS INDEX: 40.3 KG/M2 | HEART RATE: 90 BPM | RESPIRATION RATE: 12 BRPM

## 2024-02-09 VITALS — DIASTOLIC BLOOD PRESSURE: 80 MMHG | SYSTOLIC BLOOD PRESSURE: 130 MMHG

## 2024-02-09 DIAGNOSIS — F32.A DEPRESSION, UNSPECIFIED: ICD-10-CM

## 2024-02-09 DIAGNOSIS — K52.9 NONINFECTIVE GASTROENTERITIS AND COLITIS, UNSPECIFIED: ICD-10-CM

## 2024-02-09 DIAGNOSIS — D64.9 ANEMIA, UNSPECIFIED: ICD-10-CM

## 2024-02-09 PROCEDURE — 99214 OFFICE O/P EST MOD 30 MIN: CPT

## 2024-02-09 NOTE — PHYSICAL EXAM
[Alert] : alert [Normal Voice/Communication] : normal voice/communication [Healthy Appearing] : healthy appearing [No Acute Distress] : no acute distress [Sclera] : the sclera and conjunctiva were normal [Hearing Threshold Finger Rub Not [place]] : hearing was normal [Normal Lips/Gums] : the lips and gums were normal [Oropharynx] : the oropharynx was normal [Normal Appearance] : the appearance of the neck was normal [No Neck Mass] : no neck mass was observed [No Respiratory Distress] : no respiratory distress [No Acc Muscle Use] : no accessory muscle use [Respiration, Rhythm And Depth] : normal respiratory rhythm and effort [Auscultation Breath Sounds / Voice Sounds] : lungs were clear to auscultation bilaterally [Heart Rate And Rhythm] : heart rate was normal and rhythm regular [Normal S1, S2] : normal S1 and S2 [Murmurs] : no murmurs [Bowel Sounds] : normal bowel sounds [Abdomen Tenderness] : non-tender [No Masses] : no abdominal mass palpated [Abdomen Soft] : soft [] : no hepatosplenomegaly [Oriented To Time, Place, And Person] : oriented to person, place, and time

## 2024-02-09 NOTE — HISTORY OF PRESENT ILLNESS
[de-identified] : 88 years old female with history diabetes/hyperlipidemia/history of terminal ileitis and intramural abscess May 2022 presents for follow-up.  Patient has uncontrolled diabetes with glucose more than 400.  She did not start taking insulin yet. She is taking Jardiance 10 mg daily only for diabetes.  She is here with her son. Patient has chronic diarrhea, following with gastroenterologist.  Diarrhea subsided since last visit after she stopped  eating dairy. She is otherwise denies any chest pain, shortness of breath, dizziness, abdominal pain. Recent blood work reviewed with patient and patient's son in details.

## 2024-02-09 NOTE — ASSESSMENT
[FreeTextEntry1] : 89 yo female, reported with hx of loose bms, admitted to [facility] 5/22 with terminal ileitis and intramural abscess, sepsis, Had colonoscopy in hospital.  Recent loose bms 5 x a week, which has improved after pt stopped diarrhea.  Recently with blood glucose up to 400. To review wtih Dr. Dee and Dr. Mahajan  IMP: 1. chronic diarrhea 2. Hx of ileitis, abdominal abscess 3. DM, poorly controlled  PLAN; 1. cat scan enterography at [facility] 2. RTO after above in 4-6 weeks 3. Imodium PRN

## 2024-02-09 NOTE — REVIEW OF SYSTEMS
[Abdominal Pain] : no abdominal pain [Nausea] : no nausea [Diarrhea] : diarrhea [Vomiting] : no vomiting [Heartburn] : no heartburn [Negative] : Heme/Lymph

## 2024-02-09 NOTE — PHYSICAL EXAM
[No Acute Distress] : no acute distress [Well Nourished] : well nourished [Normal Sclera/Conjunctiva] : normal sclera/conjunctiva [EOMI] : extraocular movements intact [Normal Outer Ear/Nose] : the outer ears and nose were normal in appearance [Normal Oropharynx] : the oropharynx was normal [Normal TMs] : both tympanic membranes were normal [No JVD] : no jugular venous distention [No Lymphadenopathy] : no lymphadenopathy [Supple] : supple [Thyroid Normal, No Nodules] : the thyroid was normal and there were no nodules present [No Respiratory Distress] : no respiratory distress  [No Accessory Muscle Use] : no accessory muscle use [Clear to Auscultation] : lungs were clear to auscultation bilaterally [Normal Rate] : normal rate  [Regular Rhythm] : with a regular rhythm [Normal S1, S2] : normal S1 and S2 [No Murmur] : no murmur heard [Pedal Pulses Present] : the pedal pulses are present [No Edema] : there was no peripheral edema [Soft] : abdomen soft [Non Tender] : non-tender [Non-distended] : non-distended [Normal Bowel Sounds] : normal bowel sounds [Normal Posterior Cervical Nodes] : no posterior cervical lymphadenopathy [Normal Anterior Cervical Nodes] : no anterior cervical lymphadenopathy [No CVA Tenderness] : no CVA  tenderness [No Spinal Tenderness] : no spinal tenderness [No Joint Swelling] : no joint swelling [Grossly Normal Strength/Tone] : grossly normal strength/tone [No Rash] : no rash [No Focal Deficits] : no focal deficits [Normal Affect] : the affect was normal [Normal Insight/Judgement] : insight and judgment were intact [de-identified] : Ambulates with walker

## 2024-02-09 NOTE — HISTORY OF PRESENT ILLNESS
[FreeTextEntry1] : 87 yo female, reported with hx of loose bms, admitted to [facility] 5/22 with terminal ileitis and intramural abscess, sepsis, Had colonoscopy in hospital.  Recent loose bms 5 x a week, which has improved after pt stopped diarrhea.  Recently with blood glucose up to 400. To review [facility] Dr. Dee and Dr. Mahajan

## 2024-02-09 NOTE — PLAN
[FreeTextEntry1] : 1.  Diabetes Diabetic education provided Reinforced to take insulin 10 mg nightly Home glucose monitoring advised Continue Jardiance 10 mg daily And the follow-up 2.  Depression/insomnia Continue mirtazapine Psych follow-up 3.  Hypertension Off meds Has cardiology appointment next week 4.  Hyperlipidemia need low fat/ low cholesterol diet / exercise

## 2024-03-07 ENCOUNTER — NON-APPOINTMENT (OUTPATIENT)
Age: 89
End: 2024-03-07

## 2024-03-08 ENCOUNTER — APPOINTMENT (OUTPATIENT)
Dept: INTERNAL MEDICINE | Facility: CLINIC | Age: 89
End: 2024-03-08
Payer: MEDICARE

## 2024-03-08 VITALS — SYSTOLIC BLOOD PRESSURE: 125 MMHG | DIASTOLIC BLOOD PRESSURE: 80 MMHG

## 2024-03-08 VITALS
BODY MASS INDEX: 37.91 KG/M2 | HEIGHT: 62 IN | HEART RATE: 97 BPM | RESPIRATION RATE: 22 BRPM | WEIGHT: 206 LBS | OXYGEN SATURATION: 96 %

## 2024-03-08 PROCEDURE — 99214 OFFICE O/P EST MOD 30 MIN: CPT

## 2024-03-08 NOTE — HISTORY OF PRESENT ILLNESS
[de-identified] : Padmini Lam is an 88 years old female with history diabetes/hyperlipidemia/history of terminal ileitis and intramural abscess May 2022 presents for follow-up.   Pt states that she has been feeling well. She is compliant with her DM II medications including Jardiance 10 mg and Lantus 10 units QHS.  Admits that she does not monitor her BP at home. She does not monitor her diet closely at home or exercise much, but has been eating less. Following with endocrinologist. Denies any exertional chest pain, dyspnea, palpitations, headaches, and dizziness.

## 2024-03-08 NOTE — END OF VISIT
[Fellow] : Fellow [FreeTextEntry3] :    Documented by Mason Pena acting as a scribe under Dr. Mahajan. 03/08/2024

## 2024-03-08 NOTE — PLAN
[FreeTextEntry1] : See plan  1.  Diabetes diabetic education provided  cont Lantus solostar 100 unit/ml 10 unit at night increase Jardiance to 25 mg QD pt not taking Metformin endo follow up Will monitor hemoglobin A1c Home glucose monitoring advised increase hydration Dietary counseling given, dietary avoidance discussed, diet and exercise reviewed with patient; patient reminded of importance of aerobic exercise, weight control, dietary compliance and regular glucose monitoring. Need low carb diet/ exercise.  2.  Depression/insomnia Continue mirtazapine Psych follow-up  3.  Hypertension Off meds Will monitor home BP  4. Hch need low fat/ low cholesterol diet / exercise / repeat fasting lipids in 1 month

## 2024-03-08 NOTE — PHYSICAL EXAM
[No Acute Distress] : no acute distress [Well Nourished] : well nourished [Normal Sclera/Conjunctiva] : normal sclera/conjunctiva [EOMI] : extraocular movements intact [Normal Outer Ear/Nose] : the outer ears and nose were normal in appearance [Normal Oropharynx] : the oropharynx was normal [Normal TMs] : both tympanic membranes were normal [No Lymphadenopathy] : no lymphadenopathy [No JVD] : no jugular venous distention [Thyroid Normal, No Nodules] : the thyroid was normal and there were no nodules present [Supple] : supple [No Accessory Muscle Use] : no accessory muscle use [No Respiratory Distress] : no respiratory distress  [Clear to Auscultation] : lungs were clear to auscultation bilaterally [Normal Rate] : normal rate  [Regular Rhythm] : with a regular rhythm [Normal S1, S2] : normal S1 and S2 [No Murmur] : no murmur heard [Pedal Pulses Present] : the pedal pulses are present [No Edema] : there was no peripheral edema [Soft] : abdomen soft [Non Tender] : non-tender [Non-distended] : non-distended [Normal Bowel Sounds] : normal bowel sounds [Normal Posterior Cervical Nodes] : no posterior cervical lymphadenopathy [Normal Anterior Cervical Nodes] : no anterior cervical lymphadenopathy [No CVA Tenderness] : no CVA  tenderness [No Spinal Tenderness] : no spinal tenderness [No Joint Swelling] : no joint swelling [Grossly Normal Strength/Tone] : grossly normal strength/tone [No Rash] : no rash [No Focal Deficits] : no focal deficits [Normal Affect] : the affect was normal [Normal Insight/Judgement] : insight and judgment were intact [de-identified] : +Ambulates with walker

## 2024-03-12 ENCOUNTER — NON-APPOINTMENT (OUTPATIENT)
Age: 89
End: 2024-03-12

## 2024-03-15 LAB
ANION GAP SERPL CALC-SCNC: 16 MMOL/L
BUN SERPL-MCNC: 24 MG/DL
CALCIUM SERPL-MCNC: 10 MG/DL
CHLORIDE SERPL-SCNC: 102 MMOL/L
CO2 SERPL-SCNC: 23 MMOL/L
CREAT SERPL-MCNC: 1.15 MG/DL
EGFR: 46 ML/MIN/1.73M2
ESTIMATED AVERAGE GLUCOSE: 260 MG/DL
FRUCTOSAMINE SERPL-MCNC: 397 UMOL/L
GLUCOSE SERPL-MCNC: 346 MG/DL
HBA1C MFR BLD HPLC: 10.7 %
POTASSIUM SERPL-SCNC: 5 MMOL/L
SODIUM SERPL-SCNC: 141 MMOL/L

## 2024-03-25 ENCOUNTER — NON-APPOINTMENT (OUTPATIENT)
Age: 89
End: 2024-03-25

## 2024-03-25 ENCOUNTER — APPOINTMENT (OUTPATIENT)
Dept: CARDIOLOGY | Facility: CLINIC | Age: 89
End: 2024-03-25
Payer: MEDICARE

## 2024-03-25 VITALS
RESPIRATION RATE: 12 BRPM | HEIGHT: 62 IN | HEART RATE: 76 BPM | DIASTOLIC BLOOD PRESSURE: 80 MMHG | OXYGEN SATURATION: 95 % | SYSTOLIC BLOOD PRESSURE: 116 MMHG

## 2024-03-25 DIAGNOSIS — E11.49 TYPE 2 DIABETES MELLITUS WITH OTHER DIABETIC NEUROLOGICAL COMPLICATION: ICD-10-CM

## 2024-03-25 DIAGNOSIS — I10 ESSENTIAL (PRIMARY) HYPERTENSION: ICD-10-CM

## 2024-03-25 DIAGNOSIS — R06.09 OTHER FORMS OF DYSPNEA: ICD-10-CM

## 2024-03-25 PROCEDURE — G2211 COMPLEX E/M VISIT ADD ON: CPT

## 2024-03-25 PROCEDURE — 93000 ELECTROCARDIOGRAM COMPLETE: CPT

## 2024-03-25 PROCEDURE — 99204 OFFICE O/P NEW MOD 45 MIN: CPT

## 2024-03-25 RX ORDER — ROSUVASTATIN CALCIUM 10 MG/1
10 TABLET, FILM COATED ORAL
Qty: 90 | Refills: 1 | Status: ACTIVE | COMMUNITY
Start: 2024-03-25 | End: 1900-01-01

## 2024-03-25 NOTE — PHYSICAL EXAM
[Well Nourished] : well nourished [No Acute Distress] : no acute distress [Well Developed] : well developed [Normal Venous Pressure] : normal venous pressure [Normal Conjunctiva] : normal conjunctiva [No Carotid Bruit] : no carotid bruit [Normal S1, S2] : normal S1, S2 [No Murmur] : no murmur [No Gallop] : no gallop [No Rub] : no rub [Good Air Entry] : good air entry [No Respiratory Distress] : no respiratory distress  [Clear Lung Fields] : clear lung fields [Soft] : abdomen soft [Non Tender] : non-tender [No Masses/organomegaly] : no masses/organomegaly [Normal Gait] : normal gait [Normal Bowel Sounds] : normal bowel sounds [No Edema] : no edema [No Cyanosis] : no cyanosis [No Varicosities] : no varicosities [No Clubbing] : no clubbing [No Rash] : no rash [No Skin Lesions] : no skin lesions [Moves all extremities] : moves all extremities [No Focal Deficits] : no focal deficits [Normal Speech] : normal speech [Normal memory] : normal memory [Alert and Oriented] : alert and oriented

## 2024-03-25 NOTE — DISCUSSION/SUMMARY
[EKG obtained to assist in diagnosis and management of assessed problem(s)] : EKG obtained to assist in diagnosis and management of assessed problem(s) [FreeTextEntry1] : The patient is an 88-year-old female DM, HLD, terminal ileitis, intramural abscess 5/22, depression, who is deconditioned.  #1 CV- ECHO ordered #2 HLD- start rosuvastatin 10mg #3 DM-  c/w lantus, metformin and jardiance #4 Anxiety- c/w mirtazapine, needs to start walking around the house every hour for exercise. son supportive.

## 2024-03-25 NOTE — HISTORY OF PRESENT ILLNESS
[FreeTextEntry1] : Padmini is an 88-year-old female DM, HLD, terminal ileitis, intramural abscess 5/22, depression who presents for evaluation. Her glucose has been out of control No exercise.

## 2024-05-31 ENCOUNTER — APPOINTMENT (OUTPATIENT)
Dept: CARDIOLOGY | Facility: CLINIC | Age: 89
End: 2024-05-31
Payer: MEDICARE

## 2024-05-31 ENCOUNTER — APPOINTMENT (OUTPATIENT)
Dept: INTERNAL MEDICINE | Facility: CLINIC | Age: 89
End: 2024-05-31
Payer: MEDICARE

## 2024-05-31 VITALS — SYSTOLIC BLOOD PRESSURE: 122 MMHG | DIASTOLIC BLOOD PRESSURE: 70 MMHG

## 2024-05-31 VITALS
HEIGHT: 62 IN | OXYGEN SATURATION: 95 % | TEMPERATURE: 97.3 F | RESPIRATION RATE: 12 BRPM | HEART RATE: 76 BPM | WEIGHT: 208 LBS | BODY MASS INDEX: 38.28 KG/M2

## 2024-05-31 DIAGNOSIS — G47.00 INSOMNIA, UNSPECIFIED: ICD-10-CM

## 2024-05-31 DIAGNOSIS — E66.01 MORBID (SEVERE) OBESITY DUE TO EXCESS CALORIES: ICD-10-CM

## 2024-05-31 PROCEDURE — 99214 OFFICE O/P EST MOD 30 MIN: CPT

## 2024-05-31 PROCEDURE — 93306 TTE W/DOPPLER COMPLETE: CPT

## 2024-05-31 NOTE — END OF VISIT
[FreeTextEntry3] :  Documented by Kamille Toscano acting as a scribe under Dr. Mahajan. 05/31/2024

## 2024-05-31 NOTE — HISTORY OF PRESENT ILLNESS
[de-identified] : Ms. DONNA PEREZ is an 88 year old female with Hx. of DM II, HLD, and insomnia presenting for a follow up on chronic problems. She is accompanied by her son.  Pt's son reports that she is not compliant with her insulin injections.  She is not compliant with home glucose monitoring.  She reports intermittent loose bowel movement, denies associated blood in stool and abdominal pain. Denies CP, SOB, dizziness.  She lives alone, not physically active.  She is not walking much during the day.  She is otherwise well and offers no additional complaints.

## 2024-05-31 NOTE — PHYSICAL EXAM
[No Acute Distress] : no acute distress [Well Nourished] : well nourished [Well Developed] : well developed [Well-Appearing] : well-appearing [Normal Sclera/Conjunctiva] : normal sclera/conjunctiva [PERRL] : pupils equal round and reactive to light [EOMI] : extraocular movements intact [Normal Outer Ear/Nose] : the outer ears and nose were normal in appearance [Normal Oropharynx] : the oropharynx was normal [No JVD] : no jugular venous distention [No Lymphadenopathy] : no lymphadenopathy [Supple] : supple [Thyroid Normal, No Nodules] : the thyroid was normal and there were no nodules present [No Respiratory Distress] : no respiratory distress  [No Accessory Muscle Use] : no accessory muscle use [Clear to Auscultation] : lungs were clear to auscultation bilaterally [Normal Rate] : normal rate  [Regular Rhythm] : with a regular rhythm [Normal S1, S2] : normal S1 and S2 [No Murmur] : no murmur heard [No Carotid Bruits] : no carotid bruits [Pedal Pulses Present] : the pedal pulses are present [No Edema] : there was no peripheral edema [Soft] : abdomen soft [Non Tender] : non-tender [Non-distended] : non-distended [Normal Bowel Sounds] : normal bowel sounds [Normal Posterior Cervical Nodes] : no posterior cervical lymphadenopathy [Normal Anterior Cervical Nodes] : no anterior cervical lymphadenopathy [No CVA Tenderness] : no CVA  tenderness [No Spinal Tenderness] : no spinal tenderness [No Joint Swelling] : no joint swelling [Grossly Normal Strength/Tone] : grossly normal strength/tone [No Rash] : no rash [No Focal Deficits] : no focal deficits [Normal Gait] : normal gait [Deep Tendon Reflexes (DTR)] : deep tendon reflexes were 2+ and symmetric [Normal Affect] : the affect was normal [Normal Insight/Judgement] : insight and judgment were intact

## 2024-05-31 NOTE — REVIEW OF SYSTEMS
[Diarrhea] : diarrhea [Negative] : Heme/Lymph [Abdominal Pain] : no abdominal pain [Nausea] : no nausea [Constipation] : no constipation [Vomiting] : no vomiting [Heartburn] : no heartburn [Melena] : no melena [FreeTextEntry7] : +intermittent diarrhea

## 2024-05-31 NOTE — PLAN
[FreeTextEntry1] : See plan.  HLD cont. rosuvastatin 10 mg QD will monitor lipids and LFT  DM II cont. jardiance 25 mg QD cont. metformin 500 mg QD due to loose BM  restart  Lantus 12 units QD  Insomnia Mirtazapine prn   Bloodwork ordered. Pt. instructed to follow up in 2 weeks.

## 2024-06-10 LAB
ALBUMIN SERPL ELPH-MCNC: 4.1 G/DL
ALP BLD-CCNC: 88 U/L
ALT SERPL-CCNC: 10 U/L
ANION GAP SERPL CALC-SCNC: 14 MMOL/L
AST SERPL-CCNC: 22 U/L
BASOPHILS # BLD AUTO: 0.07 K/UL
BASOPHILS NFR BLD AUTO: 0.8 %
BILIRUB SERPL-MCNC: 0.4 MG/DL
BUN SERPL-MCNC: 24 MG/DL
CALCIUM SERPL-MCNC: 9.7 MG/DL
CHLORIDE SERPL-SCNC: 101 MMOL/L
CHOLEST SERPL-MCNC: 143 MG/DL
CO2 SERPL-SCNC: 24 MMOL/L
CREAT SERPL-MCNC: 1.15 MG/DL
EGFR: 46 ML/MIN/1.73M2
EOSINOPHIL # BLD AUTO: 0.18 K/UL
EOSINOPHIL NFR BLD AUTO: 1.9 %
ESTIMATED AVERAGE GLUCOSE: 217 MG/DL
GLUCOSE SERPL-MCNC: 233 MG/DL
HBA1C MFR BLD HPLC: 9.2 %
HCT VFR BLD CALC: 43.7 %
HDLC SERPL-MCNC: 58 MG/DL
HGB BLD-MCNC: 14 G/DL
IMM GRANULOCYTES NFR BLD AUTO: 0.6 %
LDLC SERPL CALC-MCNC: 67 MG/DL
LYMPHOCYTES # BLD AUTO: 1.52 K/UL
LYMPHOCYTES NFR BLD AUTO: 16.3 %
MAN DIFF?: NORMAL
MCHC RBC-ENTMCNC: 30.2 PG
MCHC RBC-ENTMCNC: 32 GM/DL
MCV RBC AUTO: 94.4 FL
MONOCYTES # BLD AUTO: 0.62 K/UL
MONOCYTES NFR BLD AUTO: 6.7 %
NEUTROPHILS # BLD AUTO: 6.87 K/UL
NEUTROPHILS NFR BLD AUTO: 73.7 %
NONHDLC SERPL-MCNC: 85 MG/DL
PLATELET # BLD AUTO: 254 K/UL
POTASSIUM SERPL-SCNC: 4.2 MMOL/L
PROT SERPL-MCNC: 7.4 G/DL
RBC # BLD: 4.63 M/UL
RBC # FLD: 12.8 %
SODIUM SERPL-SCNC: 140 MMOL/L
TRIGL SERPL-MCNC: 100 MG/DL
WBC # FLD AUTO: 9.32 K/UL

## 2024-06-26 RX ORDER — METFORMIN ER 500 MG 500 MG/1
500 TABLET ORAL
Qty: 180 | Refills: 2 | Status: ACTIVE | COMMUNITY
Start: 2024-02-06 | End: 1900-01-01

## 2024-07-02 ENCOUNTER — APPOINTMENT (OUTPATIENT)
Dept: INTERNAL MEDICINE | Facility: CLINIC | Age: 89
End: 2024-07-02
Payer: MEDICARE

## 2024-07-02 VITALS — SYSTOLIC BLOOD PRESSURE: 110 MMHG | DIASTOLIC BLOOD PRESSURE: 75 MMHG

## 2024-07-02 VITALS
WEIGHT: 206 LBS | HEART RATE: 94 BPM | BODY MASS INDEX: 37.91 KG/M2 | RESPIRATION RATE: 12 BRPM | OXYGEN SATURATION: 96 % | TEMPERATURE: 97.2 F | HEIGHT: 62 IN

## 2024-07-02 DIAGNOSIS — G47.00 INSOMNIA, UNSPECIFIED: ICD-10-CM

## 2024-07-02 PROBLEM — E78.2 HYPERLIPIDEMIA, MIXED: Status: ACTIVE | Noted: 2024-02-04

## 2024-07-02 PROBLEM — E11.9 DIABETES MELLITUS, TYPE 2: Status: ACTIVE | Noted: 2021-11-24

## 2024-07-02 PROCEDURE — 99214 OFFICE O/P EST MOD 30 MIN: CPT

## 2024-07-10 LAB
ALBUMIN SERPL ELPH-MCNC: 4 G/DL
ALP BLD-CCNC: 93 U/L
ALT SERPL-CCNC: 14 U/L
ANION GAP SERPL CALC-SCNC: 14 MMOL/L
AST SERPL-CCNC: 19 U/L
BILIRUB SERPL-MCNC: 0.2 MG/DL
BUN SERPL-MCNC: 23 MG/DL
CALCIUM SERPL-MCNC: 9.5 MG/DL
CHLORIDE SERPL-SCNC: 102 MMOL/L
CHOLEST SERPL-MCNC: 226 MG/DL
CO2 SERPL-SCNC: 24 MMOL/L
CREAT SERPL-MCNC: 1.06 MG/DL
EGFR: 51 ML/MIN/1.73M2
ESTIMATED AVERAGE GLUCOSE: 237 MG/DL
GLUCOSE SERPL-MCNC: 240 MG/DL
HBA1C MFR BLD HPLC: 9.9 %
HDLC SERPL-MCNC: 60 MG/DL
LDLC SERPL CALC-MCNC: 137 MG/DL
NONHDLC SERPL-MCNC: 166 MG/DL
POTASSIUM SERPL-SCNC: 4.8 MMOL/L
PROT SERPL-MCNC: 7 G/DL
SODIUM SERPL-SCNC: 140 MMOL/L
TRIGL SERPL-MCNC: 162 MG/DL

## 2024-07-12 ENCOUNTER — APPOINTMENT (OUTPATIENT)
Dept: INTERNAL MEDICINE | Facility: CLINIC | Age: 89
End: 2024-07-12
Payer: MEDICARE

## 2024-07-12 VITALS
BODY MASS INDEX: 37.91 KG/M2 | TEMPERATURE: 97.2 F | HEIGHT: 62 IN | HEART RATE: 66 BPM | OXYGEN SATURATION: 97 % | RESPIRATION RATE: 12 BRPM | WEIGHT: 206 LBS

## 2024-07-12 VITALS — SYSTOLIC BLOOD PRESSURE: 130 MMHG | DIASTOLIC BLOOD PRESSURE: 75 MMHG

## 2024-07-12 DIAGNOSIS — E78.2 MIXED HYPERLIPIDEMIA: ICD-10-CM

## 2024-07-12 DIAGNOSIS — E11.9 TYPE 2 DIABETES MELLITUS W/OUT COMPLICATIONS: ICD-10-CM

## 2024-07-12 DIAGNOSIS — I10 ESSENTIAL (PRIMARY) HYPERTENSION: ICD-10-CM

## 2024-07-12 DIAGNOSIS — E66.01 MORBID (SEVERE) OBESITY DUE TO EXCESS CALORIES: ICD-10-CM

## 2024-07-12 PROCEDURE — 99214 OFFICE O/P EST MOD 30 MIN: CPT

## 2024-08-12 ENCOUNTER — TRANSCRIPTION ENCOUNTER (OUTPATIENT)
Age: 89
End: 2024-08-12

## 2024-08-19 ENCOUNTER — APPOINTMENT (OUTPATIENT)
Dept: INTERNAL MEDICINE | Facility: CLINIC | Age: 89
End: 2024-08-19

## 2024-08-20 ENCOUNTER — NON-APPOINTMENT (OUTPATIENT)
Age: 89
End: 2024-08-20

## 2024-12-24 ENCOUNTER — APPOINTMENT (OUTPATIENT)
Dept: ENDOCRINOLOGY | Facility: CLINIC | Age: 88
End: 2024-12-24

## 2024-12-28 NOTE — ED ADULT NURSE NOTE - SUICIDE SCREENING QUESTION 1
Elevated TSH  Normal free T4 at 0.65  Patient was not receiving levothyroxine due to  PEG tube malfunctioning   Crease levothyroxine dose to 150 mcg daily  Repeat thyroid study in 4 to 6 weeks   No

## (undated) DEVICE — SYR LUER LOK 50CC

## (undated) DEVICE — ELCTR GROUNDING PAD ADULT COVIDIEN

## (undated) DEVICE — BIOPSY FORCEP RADIAL JAW 4 STANDARD WITH NEEDLE

## (undated) DEVICE — BRUSH COLONOSCOPY CYTOLOGY

## (undated) DEVICE — POLY TRAP ETRAP

## (undated) DEVICE — FORCEP RADIAL JAW 4 JUMBO 2.8MM 3.2MM 240CM ORANGE DISP

## (undated) DEVICE — TUBING SUCTION CONN 6FT STERILE

## (undated) DEVICE — CATH IV SAFE BC 20G X 1.16" (PINK)

## (undated) DEVICE — SOL INJ NS 0.9% 500ML 2 PORT

## (undated) DEVICE — PACK IV START WITH CHG

## (undated) DEVICE — CATH IV SAFE BC 22G X 1" (BLUE)

## (undated) DEVICE — TUBING SUCTION 20FT

## (undated) DEVICE — IRRIGATOR BIO SHIELD

## (undated) DEVICE — SUCTION YANKAUER NO CONTROL VENT

## (undated) DEVICE — SENSOR O2 FINGER ADULT

## (undated) DEVICE — CLAMP BX HOT RAD JAW 3

## (undated) DEVICE — TUBING IV SET GRAVITY 3Y 100" MACRO

## (undated) DEVICE — FOLEY HOLDER STATLOCK 2 WAY ADULT